# Patient Record
Sex: FEMALE | Race: BLACK OR AFRICAN AMERICAN
[De-identification: names, ages, dates, MRNs, and addresses within clinical notes are randomized per-mention and may not be internally consistent; named-entity substitution may affect disease eponyms.]

---

## 2019-11-01 ENCOUNTER — HOSPITAL ENCOUNTER (EMERGENCY)
Dept: HOSPITAL 62 - ER | Age: 69
LOS: 1 days | Discharge: HOME | End: 2019-11-02
Payer: MEDICARE

## 2019-11-01 DIAGNOSIS — R00.0: ICD-10-CM

## 2019-11-01 DIAGNOSIS — I10: ICD-10-CM

## 2019-11-01 DIAGNOSIS — K62.5: Primary | ICD-10-CM

## 2019-11-01 DIAGNOSIS — R19.4: ICD-10-CM

## 2019-11-01 DIAGNOSIS — R10.9: ICD-10-CM

## 2019-11-01 DIAGNOSIS — Z79.899: ICD-10-CM

## 2019-11-01 DIAGNOSIS — K57.30: ICD-10-CM

## 2019-11-01 DIAGNOSIS — R10.814: ICD-10-CM

## 2019-11-01 LAB
ADD MANUAL DIFF: NO
ALBUMIN SERPL-MCNC: 4.5 G/DL (ref 3.5–5)
ALP SERPL-CCNC: 94 U/L (ref 38–126)
ANION GAP SERPL CALC-SCNC: 9 MMOL/L (ref 5–19)
APPEARANCE UR: (no result)
APTT BLD: 30.2 SEC (ref 23.5–35.8)
APTT PPP: YELLOW S
AST SERPL-CCNC: 29 U/L (ref 14–36)
BASOPHILS # BLD AUTO: 0.1 10^3/UL (ref 0–0.2)
BASOPHILS NFR BLD AUTO: 0.7 % (ref 0–2)
BILIRUB DIRECT SERPL-MCNC: 0.2 MG/DL (ref 0–0.4)
BILIRUB SERPL-MCNC: 0.5 MG/DL (ref 0.2–1.3)
BILIRUB UR QL STRIP: NEGATIVE
BUN SERPL-MCNC: 16 MG/DL (ref 7–20)
CALCIUM: 9.5 MG/DL (ref 8.4–10.2)
CHLORIDE SERPL-SCNC: 103 MMOL/L (ref 98–107)
CO2 SERPL-SCNC: 28 MMOL/L (ref 22–30)
EOSINOPHIL # BLD AUTO: 0.2 10^3/UL (ref 0–0.6)
EOSINOPHIL NFR BLD AUTO: 3.2 % (ref 0–6)
ERYTHROCYTE [DISTWIDTH] IN BLOOD BY AUTOMATED COUNT: 14.6 % (ref 11.5–14)
GLUCOSE SERPL-MCNC: 156 MG/DL (ref 75–110)
GLUCOSE UR STRIP-MCNC: NEGATIVE MG/DL
HCT VFR BLD CALC: 40.9 % (ref 36–47)
HGB BLD-MCNC: 13.9 G/DL (ref 12–15.5)
INR PPP: 1.04
KETONES UR STRIP-MCNC: NEGATIVE MG/DL
LYMPHOCYTES # BLD AUTO: 2 10^3/UL (ref 0.5–4.7)
LYMPHOCYTES NFR BLD AUTO: 26.7 % (ref 13–45)
MCH RBC QN AUTO: 29.7 PG (ref 27–33.4)
MCHC RBC AUTO-ENTMCNC: 34.1 G/DL (ref 32–36)
MCV RBC AUTO: 87 FL (ref 80–97)
MONOCYTES # BLD AUTO: 0.5 10^3/UL (ref 0.1–1.4)
MONOCYTES NFR BLD AUTO: 6.6 % (ref 3–13)
NEUTROPHILS # BLD AUTO: 4.7 10^3/UL (ref 1.7–8.2)
NEUTS SEG NFR BLD AUTO: 62.8 % (ref 42–78)
PH UR STRIP: 5 [PH] (ref 5–9)
PLATELET # BLD: 244 10^3/UL (ref 150–450)
POTASSIUM SERPL-SCNC: 4 MMOL/L (ref 3.6–5)
PROT SERPL-MCNC: 7.7 G/DL (ref 6.3–8.2)
PROT UR STRIP-MCNC: NEGATIVE MG/DL
PROTHROMBIN TIME: 13.6 SEC (ref 11.4–15.4)
RBC # BLD AUTO: 4.7 10^6/UL (ref 3.72–5.28)
SP GR UR STRIP: 1.02
TOTAL CELLS COUNTED % (AUTO): 100 %
UROBILINOGEN UR-MCNC: NEGATIVE MG/DL (ref ?–2)
WBC # BLD AUTO: 7.5 10^3/UL (ref 4–10.5)

## 2019-11-01 PROCEDURE — 86850 RBC ANTIBODY SCREEN: CPT

## 2019-11-01 PROCEDURE — 36415 COLL VENOUS BLD VENIPUNCTURE: CPT

## 2019-11-01 PROCEDURE — 80053 COMPREHEN METABOLIC PANEL: CPT

## 2019-11-01 PROCEDURE — 86901 BLOOD TYPING SEROLOGIC RH(D): CPT

## 2019-11-01 PROCEDURE — 85730 THROMBOPLASTIN TIME PARTIAL: CPT

## 2019-11-01 PROCEDURE — 96361 HYDRATE IV INFUSION ADD-ON: CPT

## 2019-11-01 PROCEDURE — 74177 CT ABD & PELVIS W/CONTRAST: CPT

## 2019-11-01 PROCEDURE — 85610 PROTHROMBIN TIME: CPT

## 2019-11-01 PROCEDURE — 96360 HYDRATION IV INFUSION INIT: CPT

## 2019-11-01 PROCEDURE — 81001 URINALYSIS AUTO W/SCOPE: CPT

## 2019-11-01 PROCEDURE — 83690 ASSAY OF LIPASE: CPT

## 2019-11-01 PROCEDURE — 99284 EMERGENCY DEPT VISIT MOD MDM: CPT

## 2019-11-01 PROCEDURE — 85025 COMPLETE CBC W/AUTO DIFF WBC: CPT

## 2019-11-01 PROCEDURE — 86900 BLOOD TYPING SEROLOGIC ABO: CPT

## 2019-11-01 NOTE — RADIOLOGY REPORT (SQ)
CLINICAL HISTORY:  rectal bleeding, abdominal tenderness 



COMPARISON: None.



TECHNIQUE: CT ABDOMEN PELVIS WITH IV CONTRAST on 11/1/2019 10:37

PM CDT



This exam was performed according to our departmental

dose-optimization program, which includes automated exposure

control, adjustment of the mA and/or kV according to patient size

and/or use of iterative reconstruction technique.



FINDINGS: 



Lower lungs are clear.



Abdomen: There are several small cysts within the right lobe of

the liver. There is no biliary dilatation. Gallbladder is normal

in appearance. The pancreas and spleen are normal in appearance.

The adrenal glands and kidneys are unremarkable.



Abdominal aorta is normal in course and caliber without aneurysm.

There is no free air. There is no retroperitoneal adenopathy.



Pelvis: There is severe diverticulosis of the distal colon. There

is moderate amount stool throughout the colon. Urinary bladder is

unremarkable. There is no free fluid. Hysterectomy was performed.

Appendix is normal.



Skeleton: There are no acute osseous findings. No suspicious bony

lesions.



IMPRESSION: 



Extensive distal colonic diverticulosis without definite

diverticulitis.

## 2019-11-01 NOTE — ER DOCUMENT REPORT
ED Medical Screen (RME)





- General


Chief Complaint: Rectal Bleeding


Stated Complaint: RECTAL BLEEDING


Time Seen by Provider: 11/01/19 21:18


Mode of Arrival: Ambulatory


Information source: Patient


Notes: 





69-year-old female presents to ED for rectal bleeding.  She states this morning 

she had a stool that was dark with a lot of blood in the stool.  She states 

later in the day she had another stool that had lighter amount of blood in it 

and then tonight she had another dark stool with a lot of blood and that scared 

her so she came to the emergency room.  She states she had a colonoscopy she t

hinks it was last year and it was fine has never had blood in her stool before. 

He has a history of high blood pressure diabetes has had a hysterectomy and a 

surgery on her eyes as a child.  She denies any nausea vomiting or abdominal 

pain.











I have greeted and performed a rapid initial assessment of this patient.  A 

comprehensive ED assessment and evaluation of the patient, analysis of test res

ults and completion of medical decision making process will be conducted by an 

additional ED providers.





Physical Exam





- Vital signs


Vitals: 





                                        











Temp Pulse Resp BP Pulse Ox


 


 98.1 F   114 H  12   159/112 H  100 


 


 11/01/19 21:15  11/01/19 21:15  11/01/19 21:15  11/01/19 21:15  11/01/19 21:15














Course





- Vital Signs


Vital signs: 





                                        











Temp Pulse Resp BP Pulse Ox


 


 98.1 F   114 H  12   159/112 H  100 


 


 11/01/19 21:15  11/01/19 21:15  11/01/19 21:15  11/01/19 21:15  11/01/19 21:15

## 2019-11-01 NOTE — ER DOCUMENT REPORT
ED General





- General


Chief Complaint: Rectal Bleeding


Stated Complaint: RECTAL BLEEDING


Time Seen by Provider: 11/01/19 21:18


Mode of Arrival: Ambulatory





- HPI


Notes: 





Patient is a 69-year-old female who presents the emergency department for 

evaluation of rectal bleeding.  Patient states she woke this morning, thought 

she was going to have some diarrhea.  She had a large bowel movement that was 

dark in color, loose, with bright red blood.  She denied any associated pain 

with it.  She states she had a second bowel movement later in the afternoon with

less blood, and the stool was formed, not as dark.  She had a third bowel 

movement later in the evening but she states again had a large amount of dark 

red blood and loose stool.  She states she did have some minimal left-sided 

abdominal pain associated with this.  She really cannot describe it for me.  No 

gill fevers or chills.  No nausea or vomiting.  She had a colonoscopy 1 to 2 

years ago.  She states she was told there were no significant abnormalities.





- Related Data


Allergies/Adverse Reactions: 


                                        





Penicillins Adverse Reaction (Verified 11/01/19 21:24)


   








Home Medications: Losartan, unknown dose daily, aspirin 325 mg as needed





Past Medical History





- General


Information source: Patient





- Social History


Smoking Status: Never Smoker


Chew tobacco use (# tins/day): No


Frequency of alcohol use: None


Drug Abuse: None


Family History: Reviewed & Not Pertinent


Patient has suicidal ideation: No


Patient has homicidal ideation: No





- Past Medical History


Cardiac Medical History: Reports: Hx Hypertension





Review of Systems





- Review of Systems


Constitutional: No symptoms reported


EENT: No symptoms reported


Cardiovascular: No symptoms reported


Respiratory: No symptoms reported


Gastrointestinal: See HPI


Genitourinary: No symptoms reported


Musculoskeletal: No symptoms reported


Skin: No symptoms reported


Neurological/Psychological: No symptoms reported





Physical Exam





- Vital signs


Vitals: 


                                        











Temp Pulse Resp BP Pulse Ox


 


 98.1 F   114 H  12   159/112 H  100 


 


 11/01/19 21:15  11/01/19 21:15  11/01/19 21:15  11/01/19 21:15  11/01/19 21:15














- Notes


Notes: 





Vital signs reviewed, please refer to chart. Head is normocephalic, atraumatic. 

Pupils equal round, reactive to light.  Neck is supple without meningismus.  

Heart is regular rate and rhythm.  Lungs are clear to auscultation bilaterally. 

Abdomen is soft, moderate tenderness in the left mid to lower quadrants without 

rebound or guarding, normoactive bowel sounds throughout.  Extremities without 

cyanosis, clubbing. Posterior calves are nontender.  Peripheral pulses are 

equal.  Skin is warm and dry.  Patient is awake, alert, neurological exam is 

nonfocal.





Course





- Re-evaluation


Re-evalutation: 





11/01/19 22:43


Patient presents emergency department for evaluation of rectal bleeding.  She is

mildly tachycardic on arrival.  She had blood work ordered, including coags.  CT

scan of the abdomen and pelvis ordered secondary to tenderness and bleeding.  

She is given IV fluids.  We will continue to monitor.


11/02/19 02:43


Patient remained stable throughout the course of her stay.  Serial abdominal 

exams are benign.  She did provide a stool sample which was in fact found to be 

heme negative.  CT scan revealed only diverticulosis.  Her hemoglobin is stable.

 Patient had a colonoscopy 2 years ago.  At this point I feel comfortable with 

the patient being discharged to home.  She denies any melena, has had no further

hematochezia here.  Patient feels comfortable with the idea of discharge.


To the patient if she had any further bloody bowel movements she needs to 

return, and she voiced understanding.  She is to return to the ED with 

worsening.





- Vital Signs


Vital signs: 


                                        











Temp Pulse Resp BP Pulse Ox


 


 98.1 F   107 H  17   155/110 H  99 


 


 11/01/19 21:15  11/01/19 21:23  11/01/19 22:18  11/01/19 21:23  11/01/19 22:18














- Laboratory


Result Diagrams: 


                                 11/01/19 21:50





                                 11/01/19 21:50


Laboratory results interpreted by me: 


                                        











  11/01/19 11/01/19 11/01/19





  21:50 21:50 21:50


 


RDW  14.6 H  


 


Glucose   156 H 


 


Urine Blood    SMALL H














- Diagnostic Test


Radiology reviewed: Reports reviewed


Radiology results interpreted by me: 





11/02/19 02:44





                                        





                                 11/01/19 21:50 





                                 11/01/19 21:50 





                                        











MCV  87 fl (80-97)   11/01/19  21:50    


 


MCH  29.7 pg (27.0-33.4)   11/01/19  21:50    


 


MCHC  34.1 g/dL (32.0-36.0)   11/01/19  21:50    


 


RDW  14.6 % (11.5-14.0)  H  11/01/19  21:50    


 


Seg Neutrophils %  62.8 % (42-78)   11/01/19  21:50    


 


Chloride  103 mmol/L ()   11/01/19  21:50    


 


Carbon Dioxide  28 mmol/L (22-30)   11/01/19  21:50    


 


Anion Gap  9  (5-19)   11/01/19  21:50    


 


Est GFR ( Amer)  > 60  (>60)   11/01/19  21:50    


 


Glucose  156 mg/dL ()  H  11/01/19  21:50    


 


Calcium  9.5 mg/dL (8.4-10.2)   11/01/19  21:50    


 


Total Bilirubin  0.5 mg/dL (0.2-1.3)   11/01/19  21:50    


 


AST  29 U/L (14-36)   11/01/19  21:50    


 


Alkaline Phosphatase  94 U/L ()   11/01/19  21:50    


 


Total Protein  7.7 g/dL (6.3-8.2)   11/01/19  21:50    


 


Albumin  4.5 g/dL (3.5-5.0)   11/01/19  21:50    


 


Lipase  138.0 U/L ()   11/01/19  21:50    


 


Urine Color  YELLOW   11/01/19  21:50    


 


Urine Appearance  SLIGHTLY-CLOUDY   11/01/19  21:50    


 


Urine pH  5.0  (5.0-9.0)   11/01/19  21:50    


 


Ur Specific Gravity  1.023   11/01/19  21:50    


 


Urine Protein  NEGATIVE mg/dL (NEGATIVE)   11/01/19  21:50    


 


Urine Glucose (UA)  NEGATIVE mg/dL (NEGATIVE)   11/01/19  21:50    


 


Urine Ketones  NEGATIVE mg/dL (NEGATIVE)   11/01/19  21:50    


 


Urine Blood  SMALL  (NEGATIVE)  H  11/01/19  21:50    


 


Urine RBC (Auto)  0 /HPF  11/01/19  21:50    


 


Blood Type  A POSITIVE   11/01/19  21:50    


 


Antibody Screen  NEGATIVE   11/01/19  21:50    











                                        





Abdomen/Pelvis CT  11/01/19 22:37


IMPRESSION: 


 


Extensive distal colonic diverticulosis without definite


diverticulitis.


 














Discharge





- Discharge


Clinical Impression: 


 Rectal bleeding, Diverticulosis of colon





Condition: Stable


Disposition: HOME, SELF-CARE


Instructions:  Rectal Bleeding, Unclear Cause ([facility])


Additional Instructions: 


Follow-up with your primary care provider on Monday.  If you have another 

episode of bleeding, or you develop new or concerning symptoms of any sort, 

please return immediately to the emergency department for evaluation.

## 2019-11-02 VITALS — DIASTOLIC BLOOD PRESSURE: 105 MMHG | SYSTOLIC BLOOD PRESSURE: 144 MMHG

## 2020-02-28 ENCOUNTER — HOSPITAL ENCOUNTER (OUTPATIENT)
Dept: HOSPITAL 62 - WI | Age: 70
End: 2020-02-28
Attending: FAMILY MEDICINE
Payer: MEDICARE

## 2020-02-28 DIAGNOSIS — Z12.31: Primary | ICD-10-CM

## 2020-02-28 PROCEDURE — 77067 SCR MAMMO BI INCL CAD: CPT

## 2020-02-28 PROCEDURE — 77063 BREAST TOMOSYNTHESIS BI: CPT

## 2020-04-05 ENCOUNTER — HOSPITAL ENCOUNTER (INPATIENT)
Dept: HOSPITAL 62 - ER | Age: 70
LOS: 26 days | DRG: 870 | End: 2020-05-01
Attending: INTERNAL MEDICINE | Admitting: INTERNAL MEDICINE
Payer: MEDICARE

## 2020-04-05 DIAGNOSIS — J45.909: ICD-10-CM

## 2020-04-05 DIAGNOSIS — E78.5: ICD-10-CM

## 2020-04-05 DIAGNOSIS — E11.65: ICD-10-CM

## 2020-04-05 DIAGNOSIS — T79.7XXA: ICD-10-CM

## 2020-04-05 DIAGNOSIS — A41.89: Primary | ICD-10-CM

## 2020-04-05 DIAGNOSIS — J18.9: ICD-10-CM

## 2020-04-05 DIAGNOSIS — E66.01: ICD-10-CM

## 2020-04-05 DIAGNOSIS — U07.1: ICD-10-CM

## 2020-04-05 DIAGNOSIS — I46.9: ICD-10-CM

## 2020-04-05 DIAGNOSIS — I10: ICD-10-CM

## 2020-04-05 DIAGNOSIS — E87.0: ICD-10-CM

## 2020-04-05 DIAGNOSIS — E87.2: ICD-10-CM

## 2020-04-05 DIAGNOSIS — D64.9: ICD-10-CM

## 2020-04-05 DIAGNOSIS — R65.21: ICD-10-CM

## 2020-04-05 DIAGNOSIS — T82.594A: ICD-10-CM

## 2020-04-05 DIAGNOSIS — J96.01: ICD-10-CM

## 2020-04-05 LAB
A TYPE INFLUENZA AG: NEGATIVE
ADD MANUAL DIFF: NO
ALBUMIN SERPL-MCNC: 3.8 G/DL (ref 3.5–5)
ALP SERPL-CCNC: 78 U/L (ref 38–126)
ANION GAP SERPL CALC-SCNC: 8 MMOL/L (ref 5–19)
AST SERPL-CCNC: 81 U/L (ref 14–36)
B INFLUENZA AG: NEGATIVE
BASOPHILS # BLD AUTO: 0 10^3/UL (ref 0–0.2)
BASOPHILS NFR BLD AUTO: 0.1 % (ref 0–2)
BILIRUB DIRECT SERPL-MCNC: 0.1 MG/DL (ref 0–0.4)
BILIRUB SERPL-MCNC: 0.9 MG/DL (ref 0.2–1.3)
BUN SERPL-MCNC: 16 MG/DL (ref 7–20)
CALCIUM: 8.5 MG/DL (ref 8.4–10.2)
CHLORIDE SERPL-SCNC: 98 MMOL/L (ref 98–107)
CO2 SERPL-SCNC: 28 MMOL/L (ref 22–30)
EOSINOPHIL # BLD AUTO: 0 10^3/UL (ref 0–0.6)
EOSINOPHIL NFR BLD AUTO: 0.1 % (ref 0–6)
ERYTHROCYTE [DISTWIDTH] IN BLOOD BY AUTOMATED COUNT: 14.1 % (ref 11.5–14)
FERRITIN SERPL-MCNC: 641 NG/ML (ref 11.1–264)
GLUCOSE SERPL-MCNC: 196 MG/DL (ref 75–110)
HCT VFR BLD CALC: 41.1 % (ref 36–47)
HGB BLD-MCNC: 14.3 G/DL (ref 12–15.5)
LYMPHOCYTES # BLD AUTO: 0.7 10^3/UL (ref 0.5–4.7)
LYMPHOCYTES NFR BLD AUTO: 9.8 % (ref 13–45)
MCH RBC QN AUTO: 28.8 PG (ref 27–33.4)
MCHC RBC AUTO-ENTMCNC: 34.7 G/DL (ref 32–36)
MCV RBC AUTO: 83 FL (ref 80–97)
MONOCYTES # BLD AUTO: 0.4 10^3/UL (ref 0.1–1.4)
MONOCYTES NFR BLD AUTO: 6.6 % (ref 3–13)
NEUTROPHILS # BLD AUTO: 5.5 10^3/UL (ref 1.7–8.2)
NEUTS SEG NFR BLD AUTO: 83.4 % (ref 42–78)
PLATELET # BLD: 198 10^3/UL (ref 150–450)
POTASSIUM SERPL-SCNC: 4.2 MMOL/L (ref 3.6–5)
PROT SERPL-MCNC: 7.1 G/DL (ref 6.3–8.2)
RBC # BLD AUTO: 4.95 10^6/UL (ref 3.72–5.28)
TOTAL CELLS COUNTED % (AUTO): 100 %
WBC # BLD AUTO: 6.6 10^3/UL (ref 4–10.5)

## 2020-04-05 PROCEDURE — 87040 BLOOD CULTURE FOR BACTERIA: CPT

## 2020-04-05 PROCEDURE — 82550 ASSAY OF CK (CPK): CPT

## 2020-04-05 PROCEDURE — 85025 COMPLETE CBC W/AUTO DIFF WBC: CPT

## 2020-04-05 PROCEDURE — 87880 STREP A ASSAY W/OPTIC: CPT

## 2020-04-05 PROCEDURE — 96372 THER/PROPH/DIAG INJ SC/IM: CPT

## 2020-04-05 PROCEDURE — 99292 CRITICAL CARE ADDL 30 MIN: CPT

## 2020-04-05 PROCEDURE — 85730 THROMBOPLASTIN TIME PARTIAL: CPT

## 2020-04-05 PROCEDURE — 36620 INSERTION CATHETER ARTERY: CPT

## 2020-04-05 PROCEDURE — 86850 RBC ANTIBODY SCREEN: CPT

## 2020-04-05 PROCEDURE — 83605 ASSAY OF LACTIC ACID: CPT

## 2020-04-05 PROCEDURE — 83615 LACTATE (LD) (LDH) ENZYME: CPT

## 2020-04-05 PROCEDURE — 82150 ASSAY OF AMYLASE: CPT

## 2020-04-05 PROCEDURE — 99285 EMERGENCY DEPT VISIT HI MDM: CPT

## 2020-04-05 PROCEDURE — 86901 BLOOD TYPING SEROLOGIC RH(D): CPT

## 2020-04-05 PROCEDURE — 87581 M.PNEUMON DNA AMP PROBE: CPT

## 2020-04-05 PROCEDURE — 87804 INFLUENZA ASSAY W/OPTIC: CPT

## 2020-04-05 PROCEDURE — 82728 ASSAY OF FERRITIN: CPT

## 2020-04-05 PROCEDURE — 83735 ASSAY OF MAGNESIUM: CPT

## 2020-04-05 PROCEDURE — 81001 URINALYSIS AUTO W/SCOPE: CPT

## 2020-04-05 PROCEDURE — 36415 COLL VENOUS BLD VENIPUNCTURE: CPT

## 2020-04-05 PROCEDURE — 83880 ASSAY OF NATRIURETIC PEPTIDE: CPT

## 2020-04-05 PROCEDURE — 87633 RESP VIRUS 12-25 TARGETS: CPT

## 2020-04-05 PROCEDURE — 84484 ASSAY OF TROPONIN QUANT: CPT

## 2020-04-05 PROCEDURE — 93010 ELECTROCARDIOGRAM REPORT: CPT

## 2020-04-05 PROCEDURE — 86900 BLOOD TYPING SEROLOGIC ABO: CPT

## 2020-04-05 PROCEDURE — 87635 SARS-COV-2 COVID-19 AMP PRB: CPT

## 2020-04-05 PROCEDURE — S0119 ONDANSETRON 4 MG: HCPCS

## 2020-04-05 PROCEDURE — 82533 TOTAL CORTISOL: CPT

## 2020-04-05 PROCEDURE — 87150 DNA/RNA AMPLIFIED PROBE: CPT

## 2020-04-05 PROCEDURE — 80053 COMPREHEN METABOLIC PANEL: CPT

## 2020-04-05 PROCEDURE — 83690 ASSAY OF LIPASE: CPT

## 2020-04-05 PROCEDURE — 86920 COMPATIBILITY TEST SPIN: CPT

## 2020-04-05 PROCEDURE — 86140 C-REACTIVE PROTEIN: CPT

## 2020-04-05 PROCEDURE — 87449 NOS EACH ORGANISM AG IA: CPT

## 2020-04-05 PROCEDURE — 87070 CULTURE OTHR SPECIMN AEROBIC: CPT

## 2020-04-05 PROCEDURE — 31500 INSERT EMERGENCY AIRWAY: CPT

## 2020-04-05 PROCEDURE — 96360 HYDRATION IV INFUSION INIT: CPT

## 2020-04-05 PROCEDURE — 80048 BASIC METABOLIC PNL TOTAL CA: CPT

## 2020-04-05 PROCEDURE — 87324 CLOSTRIDIUM AG IA: CPT

## 2020-04-05 PROCEDURE — 94002 VENT MGMT INPAT INIT DAY: CPT

## 2020-04-05 PROCEDURE — 82553 CREATINE MB FRACTION: CPT

## 2020-04-05 PROCEDURE — 93005 ELECTROCARDIOGRAM TRACING: CPT

## 2020-04-05 PROCEDURE — 94003 VENT MGMT INPAT SUBQ DAY: CPT

## 2020-04-05 PROCEDURE — 87077 CULTURE AEROBIC IDENTIFY: CPT

## 2020-04-05 PROCEDURE — P9041 ALBUMIN (HUMAN),5%, 50ML: HCPCS

## 2020-04-05 PROCEDURE — 87186 SC STD MICRODIL/AGAR DIL: CPT

## 2020-04-05 PROCEDURE — 36430 TRANSFUSION BLD/BLD COMPNT: CPT

## 2020-04-05 PROCEDURE — 82803 BLOOD GASES ANY COMBINATION: CPT

## 2020-04-05 PROCEDURE — 84478 ASSAY OF TRIGLYCERIDES: CPT

## 2020-04-05 PROCEDURE — 85610 PROTHROMBIN TIME: CPT

## 2020-04-05 PROCEDURE — 85379 FIBRIN DEGRADATION QUANT: CPT

## 2020-04-05 PROCEDURE — 87205 SMEAR GRAM STAIN: CPT

## 2020-04-05 PROCEDURE — 99291 CRITICAL CARE FIRST HOUR: CPT

## 2020-04-05 PROCEDURE — 36600 WITHDRAWAL OF ARTERIAL BLOOD: CPT

## 2020-04-05 PROCEDURE — 83520 IMMUNOASSAY QUANT NOS NONAB: CPT

## 2020-04-05 PROCEDURE — 87486 CHLMYD PNEUM DNA AMP PROBE: CPT

## 2020-04-05 PROCEDURE — 84145 PROCALCITONIN (PCT): CPT

## 2020-04-05 PROCEDURE — 36556 INSERT NON-TUNNEL CV CATH: CPT

## 2020-04-05 PROCEDURE — 92950 HEART/LUNG RESUSCITATION CPR: CPT

## 2020-04-05 PROCEDURE — 80202 ASSAY OF VANCOMYCIN: CPT

## 2020-04-05 PROCEDURE — 94640 AIRWAY INHALATION TREATMENT: CPT

## 2020-04-05 PROCEDURE — 82962 GLUCOSE BLOOD TEST: CPT

## 2020-04-05 PROCEDURE — 84100 ASSAY OF PHOSPHORUS: CPT

## 2020-04-05 PROCEDURE — 71045 X-RAY EXAM CHEST 1 VIEW: CPT

## 2020-04-05 PROCEDURE — 87045 FECES CULTURE AEROBIC BACT: CPT

## 2020-04-05 PROCEDURE — 87798 DETECT AGENT NOS DNA AMP: CPT

## 2020-04-05 PROCEDURE — 71275 CT ANGIOGRAPHY CHEST: CPT

## 2020-04-05 PROCEDURE — P9016 RBC LEUKOCYTES REDUCED: HCPCS

## 2020-04-05 PROCEDURE — P9047 ALBUMIN (HUMAN), 25%, 50ML: HCPCS

## 2020-04-05 RX ADMIN — INSULIN LISPRO SCH UNIT: 100 INJECTION, SOLUTION INTRAVENOUS; SUBCUTANEOUS at 21:14

## 2020-04-05 RX ADMIN — OXYCODONE HYDROCHLORIDE AND ACETAMINOPHEN SCH MG: 500 TABLET ORAL at 18:57

## 2020-04-05 RX ADMIN — MELATONIN SCH MG: 3 TAB ORAL at 21:16

## 2020-04-05 NOTE — ER DOCUMENT REPORT
ED Dizziness/Weakness





- General


Chief Complaint: Weakness


Stated Complaint: SHORTNESS OF BREATH


Time Seen by Provider: 20 14:50


Primary Care Provider: 


DIANA RIVERA MD [Primary Care Provider] - Follow up as needed


Notes: 





Patient is a 70-year-old female who presents the emergency department with 

weakness and shortness of breath.  Patient has a history of asthma and 

hypertension.  She is currently on losartan and albuterol as needed.  She states

that she went to [place] for  and flew back 2 days ago.  This is during 

the COVID 19 pandemic.  Patient states that she has been using her albuterol inh

aler, but has had little relief.  Last time she used her albuterol inhaler was 

yesterday.  Patient reports feeling "rundown."


TRAVEL OUTSIDE OF THE [place] IN LAST 30 DAYS: No





- Related Data


Allergies/Adverse Reactions: 


                                        





Penicillins Adverse Reaction (Verified 19 21:24)


   








Home Medications: pt states that she is on metformin and "others for my 

cholesterol and high blood pressure but I dont remember the names".





Past Medical History





- General


Information source: Patient





- Social History


Smoking Status: Unknown if Ever Smoked


Family History: Reviewed & Not Pertinent


Patient has suicidal ideation: No


Patient has homicidal ideation: No





- Past Medical History


Cardiac Medical History: Reports: Hx Hypertension





Review of Systems





- Review of Systems


Notes: 





REVIEW OF SYSTEMS:





CONSTITUTIONAL :    Denies recent illness.  Denies recent unintentional weight 

loss.  Denies fever,  chills, or sweats. 


EENT: Denies eye, ear, throat, or mouth pain, discharge, or symptoms.  Denies 

nasal or sinus congestion.


CARDIOVASCULAR:  Denies chest pain.


RESPIRATORY: See HPI.


GASTROINTESTINAL: Denies nausea, vomiting, and diarrhea.  Denies abdominal pain.

 Denies constipation. 


GENITOURINARY:  Denies difficulty urinating, burning, blood in urine, urgency or

frequency.


MUSCULOSKELETAL:  Denies neck and back pain.  Denies joint pain or swelling.


SKIN:   Denies rash, itchiness, or lesions


HEMATOLOGIC :   Denies easy bruising or bleeding.


LYMPHATIC:  Denies swollen, painful, enlarged glands.


NEUROLOGICAL: See HPI.


PSYCHIATRIC:  Denies stress, anxiety, alteration in sleep patterns, or 

depression.





All other systems reviewed and negative.





Physical Exam





- Vital signs


Vitals: 


                                        











Resp Pulse Ox


 


 22 H  95 


 


 20 13:58  20 13:58














- Notes


Notes: 





PHYSICAL EXAMINATION:





GENERAL: Appears well, healthy, well-nourished, no acute distress. 





HEAD:  Normocephalic, atraumatic.





EYES:  PERRL, conjunctiva normal, all extraocular movements intact, sclera 

nonicteric





ENT:  Moist mucous membranes. 





NECK: Supple, no noticeable swelling, redness, rash.  Normal range of motion.





LUNGS: Diminished breath sounds in the bases.





CARDIOVASCULAR: S1-S2, regular rate, regular rhythm.  Radial pulses 2+, normal.





ABDOMEN: Normoactive bowel sounds.  Soft, nontender,  no guarding, no rebound 

tenderness, and no masses palpated.





EXTREMITIES: Normal strength and range of motion, no pitting or edema.  No 

cyanosis. 





NEUROLOGICAL: Moves all extremities upon command.  Strength 5/5 in all 

extremities. 





PSYCH: Normal mood, normal affect.





SKIN: Warm, dry.  No rash, lesions, ulcerations noted.  Normal skin turgor.





Course





- Re-evaluation


Re-evalutation: 


20 16:00


Patient's hematology does not show leukocytosis, but she does have a left shift 

with 83.4% neutrophils and only 9.8 lymphocytes.  Chemistries show a sodium of 

134.4.  Patient received IV fluids.  AST and ALT are elevated, most likely due 

to fatty liver disease.  Strep and influenza test are negative.  Chest x-ray 

shows possible pulmonary edema, infectious, or viral etiology.  BNP is normal, 

indicating this is most likely not congestive heart failure.  Patient will be 

sent for CTA of the chest to also rule out pulmonary emboli, as the patient is 

tachycardic.


20 17:31


I was called by Dr. Dillard, the radiologist.  She reports to me that the 

patient has groundglass opacities, consistent with most likely COVID 19.  Will 

call the hospitalist for admission, as the patient is still tachycardic.


20 17:38


I discussed this case with Dr. Lindo, the hospitalist, and the patient will be 

admitted to the medical COVID unit.








- Vital Signs


Vital signs: 


                                        











Temp Pulse Resp BP Pulse Ox


 


 98.9 F      23 H  128/80 H  100 


 


 20 16:31     20 16:01  20 16:01  20 16:01














- Laboratory


Result Diagrams: 


                                 20 14:00





                                 20 14:00


Laboratory results interpreted by me: 


                                        











  20





  14:00 14:00


 


RDW  14.1 H 


 


Lymph % (Auto)  9.8 L 


 


Seg Neutrophils %  83.4 H 


 


Sodium   134.4 L


 


Glucose   196 H


 


AST   81 H


 


ALT   44 H














- EKG Interpretation by Me


Additional EKG results interpreted by me: 





20 15:35


Sinus tachycardia.  Rate 101.  ; QRS 86; ; QTc 457.  No ST elev

ations or depressions noted.





Discharge





- Discharge


Clinical Impression: 


 Shortness of breath, Weakness





Condition: Stable


Disposition: ADMITTED AS INPATIENT


Admitting Provider: [place]


Unit Admitted: Medical Floor - COVID unit


Referrals: 


DIANA RIVERA MD [Primary Care Provider] - Follow up as needed

## 2020-04-05 NOTE — RADIOLOGY REPORT (SQ)
EXAM DESCRIPTION:  CHEST SINGLE VIEW



IMAGES COMPLETED DATE/TIME:  4/5/2020 2:39 pm



REASON FOR STUDY:  shortness of breath



COMPARISON:  None.



EXAM PARAMETERS:  NUMBER OF VIEWS: One view.

TECHNIQUE: Single frontal radiographic view of the chest acquired.

RADIATION DOSE: NA

LIMITATIONS: None.



FINDINGS:  LUNGS AND PLEURA: Patchy peripheral opacities in both lungs in the mid to lower region.  N
o pleural effusion or pneumothorax.

MEDIASTINUM AND HILAR STRUCTURES: No masses.  Contour normal.

HEART AND VASCULAR STRUCTURES: Heart has normal size.  There is indistinctness the pulmonary vasculat
ure with may represent mild pulmonary edema.

BONES: No acute findings.

HARDWARE: None in the chest.

OTHER: No other significant finding.



IMPRESSION:  Possible mild pulmonary edema.  Patchy peripheral opacities suspicious for infectious/in
flammatory process.



TECHNICAL DOCUMENTATION:  JOB ID:  [id number]

 2011 [employer]- All Rights Reserved



Reading location - IP/workstation name: [email or web address]

## 2020-04-05 NOTE — RADIOLOGY REPORT (SQ)
EXAM DESCRIPTION:  CTA CHEST



IMAGES COMPLETED DATE/TIME:  4/5/2020 3:56 pm



REASON FOR STUDY:  SOB; please also eval findings on x-ray.  Patchy peripheral opacities were seen on
 radiograph.



COMPARISON:  None.



TECHNIQUE:  CT scan of the chest performed using helical scanning technique with dynamic intravenous 
contrast injection.  Images reviewed with lung, soft tissue and bone windows.  Reconstructed coronal 
and sagittal MPR images reviewed.

Additional 3 dimensional post-processing performed to develop Maximal Intensity Projection images (MI
P).  All images stored on PACS.

All CT scanners at this facility use dose modulation, iterative reconstruction, and/or weight based d
osing when appropriate to reduce radiation dose to as low as reasonably achievable (ALARA).

[facility]: Dose Right  [facility]: CareDose    [facility]: Dose Right    [facility]: Teradose 4D    [facility]: Smart Technologies



CONTRAST TYPE AND DOSE:  contrast/concentration: Isovue 350.00 mg/ml; Total Contrast Delivered: 63.0 
ml; Total Saline Delivered: 80.0 ml

Contrast bolus optimized for the pulmonary arteries. Not diagnostic for the aorta.



RENAL FUNCTION:  GFR > 60.



RADIATION DOSE:  CT Rad equipment meets quality standard of care and radiation dose reduction techniq
ues were employed. CTDIvol: 14.3 - 19.8 mGy. DLP: 414 mGy-cm. .



LIMITATIONS:  None.



FINDINGS:  LUNGS AND PLEURA: There are patchy peripheral and perihilar ground-glass opacities in the 
upper and lower lobes bilaterally.  No pleural effusion.  No pneumothorax.

AORTA AND GREAT VESSELS: Aorta has normal caliber and appearance.  No aortic dissection.  Great vesse
ls are patent.

HEART: No pericardial effusion. No significant coronary artery calcifications.

PULMONARY ARTERIES: Limited evaluation of the pulmonary arteries due to contrast bolus timing.  No la
rge central pulmonary embolism.  Evaluation of the segmental and subsegmental pulmonary arteries is e
xtremely limited.

HILAR AND MEDIASTINAL STRUCTURES: No mediastinal or hilar adenopathy.  Perihilar opacities correspond
 to the findings on radiograph.

HARDWARE: None in the chest.

UPPER ABDOMEN: Hepatic cysts.

THYROID AND OTHER SOFT TISSUES: No masses.  No adenopathy.

BONES: No acute or significant finding.

3D MIPS: Confirm above findings.

OTHER: No other significant finding.



IMPRESSION:

1. Patchy peripheral and perihilar ground-glass opacities in both lungs.  These are commonly reported
 imaging features of COVID-19   pneumonia are present. Other processes such as influenza pneumonia an
d organizing pneumonia, as can be seen with drug toxicity and connective tissue disease, can cause a 
similar imaging pattern. PneTyp

2. Limited evaluation of the pulmonary arteries due to contrast bolus timing.  No large central pulmo
nary embolus.  Evaluation of the segmental and subsegmental pulmonary arteries is limited.



COMMENT:  Quality ID # 436: Final reports with documentation of one or more dose reduction techniques
 (e.g., Automated exposure control, adjustment of the mA and/or kV according to patient size, use of 
iterative reconstruction technique)



TECHNICAL DOCUMENTATION:  JOB ID:  [id number]

 2011 [employer]- All Rights Reserved



Reading location - IP/workstation name: [email or web address]

## 2020-04-05 NOTE — ADVANCED CARE
- Diagnosis


(1) Suspected COVID-19 virus infection


Diagnosis Current: Yes   





(2) Multifocal pneumonia


Diagnosis Current: Yes   





(3) Sepsis


Diagnosis Current: Yes   





(4) T2DM (type 2 diabetes mellitus)


Diagnosis Current: Yes   





(5) HLD (hyperlipidemia)


Diagnosis Current: Yes   





(6) HTN (hypertension)


Diagnosis Current: Yes   





(7) Fever


Diagnosis Current: Yes   





(8) Acute hypoxemic respiratory failure


Diagnosis Current: Yes   


Attendance: 





Patient


Resuscitation Status: Full Code


Discussion: 





All aspects of CODE STATUS including cardioversion, chest compressions, 

intubation were discussed and patient states she would like to be full code.  

She designates her daughter Mirian Hannon as her M POA


Time Spent: 17 minutes

## 2020-04-05 NOTE — PDOC H&P
History of Present Illness


Admission Date/PCP: 


  20 17:47





  DIANA RIVERA MD





History of Present Illness: 


SAROJ SORIANO is a 70 year old female with past medical history as noted who 

presented to ED after 4-day history of progressive fever/chills/shortness of 

breath/dry cough/fatigue/generalized weakness/loose stools which began while she

was in [place] visiting with family for her brother's , stating she flew

out on 3/16 and returned on , notably symptomatic prior to her departure.  

She denies any knowledge that her family members or other people around her have

also been ill.  She states the airport did not offer her any difficulties in 

accomplishing this travel despite restrictions nationwide.  In ED, patient 

notably has low-grade fevers, tachycardia, tachypnea, shortness of breath, 

lymphopenia with normal WBC, tolerating room air without supplemental oxygen.  

CTA of chest was done which was significant for multifocal pneumonia and 

radiologist specifically mentioned patient results highly consistent with covid-

19 pneumonia.  Patient admitted to coronavirus floor and I personally updated 

nursing staff but anticipates her arrival so that they may prepare therapy PPE 

appropriately.  I also notified the nursing supervisor and the local 

pediatrician that they should reevaluate any low risk patients on the 

coronavirus floor that could be moved elsewhere or discharged.








Past Medical History


Cardiac Medical History: Reports: Hypertension


Pulmonary Medical History: Reports: Asthma


Endocrine Medical History: Reports: Diabetes Mellitus Type 2





Past Surgical History


Past Surgical History: Reports: None





Social History


Information Source: Patient, Emergency Med Personnel


Lives with: Alone


Smoking Status: Never Smoker


Electronic Cigarette use?: No


Frequency of Alcohol Use: None


Hx Recreational Drug Use: No


Hx Prescription Drug Abuse: No





- Advance Directive


Resuscitation Status: Full Code


Surrogate healthcare decision maker:: 





daughter








Family History


Family History: Reviewed & Not Pertinent, CVA, Malignancy


Parental Family History Reviewed: Yes


Children Family History Reviewed: Yes


Sibling(s) Family History Reviewed.: Yes





Medication/Allergy


Home Medications: 








Aspirin [Aspirin 325 mg Tablet] 325 mg PO DAILY PRN 19 


Losartan Potassium 1 tab PO DAILY 19 








Allergies/Adverse Reactions: 


                                        





Penicillins Adverse Reaction (Verified 19 21:24)


   











Review of Systems


All systems: reviewed and no additional remarkable complaints except as stated -

See HPI for full review of systems, otherwise negative


Constitutional: PRESENT: as per HPI





Physical Exam


Vital Signs: 


                                        











Temp Pulse Resp BP Pulse Ox


 


 98.3 F      16   161/102 H  98 


 


 20 19:10     20 19:01  20 19:01  20 19:01








                                 Intake & Output











 20





 06:59 06:59 06:59


 


Intake Total   1000


 


Balance   1000


 


Weight   85.729 kg











General appearance: PRESENT: no acute distress, well-developed, well-nourished


Head exam: PRESENT: atraumatic, normocephalic


Eye exam: PRESENT: conjunctiva pink


Mouth exam: PRESENT: moist


Respiratory exam: PRESENT: crackles, rhonchi, tachypnea, unlabored.  ABSENT: 

accessory muscle use, wheezes


Cardiovascular exam: PRESENT: RRR.  ABSENT: diastolic murmur, rubs, systolic 

murmur


GI/Abdominal exam: PRESENT: normal bowel sounds, soft.  ABSENT: distended, 

guarding, mass, organolmegaly, rebound, tenderness


Rectal exam: PRESENT: deferred


Neurological exam: PRESENT: alert, awake, oriented to person, oriented to place,

oriented to time, oriented to situation


Psychiatric exam: PRESENT: appropriate affect, normal mood


Skin exam: PRESENT: dry, intact, warm





Results


Laboratory Results: 


                                        





                                 20 14:00 





                                 20 14:00 





                                        











  20





  14:00 14:00 17:50


 


WBC  6.6  


 


RBC  4.95  


 


Hgb  14.3  


 


Hct  41.1  


 


MCV  83  


 


MCH  28.8  


 


MCHC  34.7  


 


RDW  14.1 H  


 


Plt Count  198  


 


Seg Neutrophils %  83.4 H  


 


Sodium   134.4 L 


 


Potassium   4.2 


 


Chloride   98 


 


Carbon Dioxide   28 


 


Anion Gap   8 


 


BUN   16 


 


Creatinine   0.75 


 


Est GFR ( Amer)   > 60 


 


Glucose   196 H 


 


Calcium   8.5 


 


Ferritin    641.00 H


 


Total Bilirubin   0.9 


 


AST   81 H 


 


Alkaline Phosphatase   78 


 


Total Protein   7.1 


 


Albumin   3.8 








                                        











  20





  14:00


 


NT-Pro-B Natriuret Pep  32











Impressions: 


                                        





Chest X-Ray  20 15:03


IMPRESSION:  Possible mild pulmonary edema.  Patchy peripheral opacities 

suspicious for infectious/inflammatory process.


 








Chest/Abdomen CTA  20 16:08


IMPRESSION:


1. Patchy peripheral and perihilar ground-glass opacities in both lungs.  These 

are commonly reported imaging features of COVID-19   pneumonia are present. 

Other processes such as influenza pneumonia and organizing pneumonia, as can be 

seen with drug toxicity and connective tissue disease, can cause a similar 

imaging pattern. PneTyp


2. Limited evaluation of the pulmonary arteries due to contrast bolus timing.  

No large central pulmonary embolus.  Evaluation of the segmental and subse

gmental pulmonary arteries is limited.


 














Assessment and Plan





- Diagnosis


(1) Suspected COVID-19 virus infection


Is this a current diagnosis for this admission?: Yes   


Plan: 








High risk based on recent travel symptomatology and CT results


Coronavirus test sent and pending


Started on azithromycin and multiple supplements, consider adding Plaquenil at 

the first sign of worsening








(2) Multifocal pneumonia


Is this a current diagnosis for this admission?: Yes   


Plan: 





Ceftriaxone/azithromycin for antibiotic coverage; confirmed with patient that 

she is not in fact penicillin allergic


Bronchial hygiene


No nebulizer treatments or positive pressure should be used until coronavirus 

is ruled out


Respiratory viral panel pending


Influenza negative


Coronavirus test pending








(3) Sepsis


Is this a current diagnosis for this admission?: Yes   


Plan: 





Eating drinking normally and hemodynamically stable on admission


We will hold on IV fluids as this may significantly worsen ARDS if patient 

develops this later in the course of coronavirus assuming she has it


Does not require pressors


Antibiotics as above








(4) T2DM (type 2 diabetes mellitus)


Qualifiers: 


   Diabetes mellitus long term insulin use: without long term use   Diabetes 

mellitus complication status: without complication   Qualified Code(s): E11.9 - 

Type 2 diabetes mellitus without complications   


Is this a current diagnosis for this admission?: Yes   


Plan: 





Low-dose correctional insulin and Accu-Cheks


Hold orals








(5) HLD (hyperlipidemia)


Is this a current diagnosis for this admission?: Yes   


Plan: 





Not on statin per patient








(6) HTN (hypertension)


Is this a current diagnosis for this admission?: Yes   


Plan: 





Hold home meds, normal BP








(7) Fever


Is this a current diagnosis for this admission?: Yes   





(8) Acute hypoxemic respiratory failure


Is this a current diagnosis for this admission?: Yes   


Plan: 





Intermittently requiring supplemental oxygen


Oxygen goal is 92% or higher








- Time


Time Spent with patient: 35 or more minutes


Medications reviewed and adjusted accordingly: Yes





- Inpatient Certification


Based on my medical assessment, after consideration of the patient's 

comorbidities, presenting symptoms, or acuity I expect that the services needed 

warrant INPATIENT care.: Yes


I certify that my determination is in accordance with my understanding of 

Medicare's requirements for reasonable and necessary INPATIENT services [42 CFR 

412.3e].: Yes


Medical Necessity: Significant Comorbidiites Make Outpatient Treatment Too 

Risky, Need Close Monitoring Due to Risk of Patient Decompensation, Need for IV 

Antibiotics, Risk of Complication if Not Cared For in Hospital

## 2020-04-06 LAB
ADD MANUAL DIFF: NO
ALBUMIN SERPL-MCNC: 3.1 G/DL (ref 3.5–5)
ALP SERPL-CCNC: 64 U/L (ref 38–126)
ANION GAP SERPL CALC-SCNC: 5 MMOL/L (ref 5–19)
AST SERPL-CCNC: 66 U/L (ref 14–36)
BASOPHILS # BLD AUTO: 0 10^3/UL (ref 0–0.2)
BASOPHILS NFR BLD AUTO: 0.2 % (ref 0–2)
BILIRUB DIRECT SERPL-MCNC: 0.3 MG/DL (ref 0–0.4)
BILIRUB SERPL-MCNC: 0.6 MG/DL (ref 0.2–1.3)
BUN SERPL-MCNC: 10 MG/DL (ref 7–20)
CALCIUM: 8.1 MG/DL (ref 8.4–10.2)
CHLORIDE SERPL-SCNC: 101 MMOL/L (ref 98–107)
CO2 SERPL-SCNC: 27 MMOL/L (ref 22–30)
EOSINOPHIL # BLD AUTO: 0 10^3/UL (ref 0–0.6)
EOSINOPHIL NFR BLD AUTO: 0.1 % (ref 0–6)
ERYTHROCYTE [DISTWIDTH] IN BLOOD BY AUTOMATED COUNT: 14.2 % (ref 11.5–14)
GLUCOSE SERPL-MCNC: 143 MG/DL (ref 75–110)
HCT VFR BLD CALC: 35.6 % (ref 36–47)
HGB BLD-MCNC: 12.6 G/DL (ref 12–15.5)
LYMPHOCYTES # BLD AUTO: 0.5 10^3/UL (ref 0.5–4.7)
LYMPHOCYTES NFR BLD AUTO: 9.3 % (ref 13–45)
MCH RBC QN AUTO: 29.2 PG (ref 27–33.4)
MCHC RBC AUTO-ENTMCNC: 35.5 G/DL (ref 32–36)
MCV RBC AUTO: 82 FL (ref 80–97)
MONOCYTES # BLD AUTO: 0.3 10^3/UL (ref 0.1–1.4)
MONOCYTES NFR BLD AUTO: 5.6 % (ref 3–13)
NEUTROPHILS # BLD AUTO: 4.9 10^3/UL (ref 1.7–8.2)
NEUTS SEG NFR BLD AUTO: 84.8 % (ref 42–78)
PLATELET # BLD: 206 10^3/UL (ref 150–450)
POTASSIUM SERPL-SCNC: 3.7 MMOL/L (ref 3.6–5)
PROT SERPL-MCNC: 6.2 G/DL (ref 6.3–8.2)
RBC # BLD AUTO: 4.33 10^6/UL (ref 3.72–5.28)
TOTAL CELLS COUNTED % (AUTO): 100 %
WBC # BLD AUTO: 5.8 10^3/UL (ref 4–10.5)

## 2020-04-06 RX ADMIN — VANCOMYCIN HYDROCHLORIDE SCH MLS/HR: 1 INJECTION, POWDER, LYOPHILIZED, FOR SOLUTION INTRAVENOUS at 21:26

## 2020-04-06 RX ADMIN — INSULIN LISPRO SCH: 100 INJECTION, SOLUTION INTRAVENOUS; SUBCUTANEOUS at 11:20

## 2020-04-06 RX ADMIN — OXYCODONE HYDROCHLORIDE AND ACETAMINOPHEN SCH MG: 500 TABLET ORAL at 17:32

## 2020-04-06 RX ADMIN — OXYCODONE HYDROCHLORIDE AND ACETAMINOPHEN SCH MG: 500 TABLET ORAL at 09:10

## 2020-04-06 RX ADMIN — INSULIN LISPRO SCH: 100 INJECTION, SOLUTION INTRAVENOUS; SUBCUTANEOUS at 07:27

## 2020-04-06 RX ADMIN — ACETAMINOPHEN PRN MG: 325 TABLET ORAL at 15:59

## 2020-04-06 RX ADMIN — GUAIFENESIN PRN MG: 200 SOLUTION ORAL at 04:02

## 2020-04-06 RX ADMIN — AZITHROMYCIN MONOHYDRATE SCH MLS/HR: 500 INJECTION, POWDER, LYOPHILIZED, FOR SOLUTION INTRAVENOUS at 17:33

## 2020-04-06 RX ADMIN — INSULIN LISPRO SCH: 100 INJECTION, SOLUTION INTRAVENOUS; SUBCUTANEOUS at 16:08

## 2020-04-06 RX ADMIN — ENOXAPARIN SODIUM SCH MG: 40 INJECTION SUBCUTANEOUS at 09:10

## 2020-04-06 RX ADMIN — ACETAMINOPHEN PRN MG: 325 TABLET ORAL at 23:13

## 2020-04-06 RX ADMIN — ACETAMINOPHEN PRN MG: 325 TABLET ORAL at 11:18

## 2020-04-06 RX ADMIN — SODIUM CHLORIDE, SODIUM LACTATE, POTASSIUM CHLORIDE, AND CALCIUM CHLORIDE PRN MLS/HR: .6; .31; .03; .02 INJECTION, SOLUTION INTRAVENOUS at 15:00

## 2020-04-06 RX ADMIN — MELATONIN SCH MG: 3 TAB ORAL at 23:12

## 2020-04-06 RX ADMIN — ACETAMINOPHEN PRN MG: 325 TABLET ORAL at 03:41

## 2020-04-06 NOTE — EKG REPORT
SEVERITY:- ABNORMAL ECG -

SINUS TACHYCARDIA

PROBABLE LEFT ATRIAL ABNORMALITY

LEFT ANTERIOR FASCICULAR BLOCK

PROBABLE LEFT VENTRICULAR HYPERTROPHY

ABNORMAL T, CONSIDER ISCHEMIA, INFERIOR LEADS

:

Confirmed by: Raffaele Shea 06-Apr-2020 00:12:24

## 2020-04-06 NOTE — PDOC PROGRESS REPORT
Subjective


Progress Note for:: 04/06/20


Subjective:: 











Patient having higher fevers of 103 this morning, persistent myalgias and mild 

shortness of breath ongoing.  Started LR at 50 cc/h.  Blood pressure is lower 

limit normal.  Still has a dry cough.  Patient states she is spoken to her 

family and none of her family members are currently ill, I encouraged her to 

continue following up with them.  Coronavirus testing is still pending.  

Labs/symptoms/imaging are all highly suspicious for coronavirus.  Patient has no

new complaints today other than as mentioned above.


Reason For Visit: 


SHORTNESS OF BREATH,WEAKNESS








Physical Exam


Vital Signs: 


                                        











Temp Pulse Resp BP Pulse Ox


 


 100.1 F   102 H  24 H  107/81   94 


 


 04/06/20 12:06  04/06/20 11:30  04/06/20 11:30  04/06/20 11:30  04/06/20 11:30








                                 Intake & Output











 04/05/20 04/06/20 04/07/20





 06:59 06:59 06:59


 


Intake Total  1300 260


 


Balance  1300 260


 


Weight  86.1 kg 











Additional comments: 





Physical exam extremely limited by coronavirus contact/droplet/airborne 

precautions in place.  Patient was visually examined using video chat and audio.

 Head is normocephalic/atraumatic, mucous membranes moist.  No accessory muscle 

use or retractions with breathing.





Results


Laboratory Results: 


                                        





                                 04/06/20 05:31 





                                 04/06/20 05:31 





                                        











  04/05/20 04/06/20 04/06/20





  17:50 05:31 05:31


 


WBC   5.8 


 


RBC   4.33 


 


Hgb   12.6 


 


Hct   35.6 L 


 


MCV   82 


 


MCH   29.2 


 


MCHC   35.5 


 


RDW   14.2 H 


 


Plt Count   206 


 


Seg Neutrophils %   84.8 H 


 


Sodium    133.2 L


 


Potassium    3.7


 


Chloride    101


 


Carbon Dioxide    27


 


Anion Gap    5


 


BUN    10


 


Creatinine    0.63


 


Est GFR ( Amer)    > 60


 


Glucose    143 H


 


Calcium    8.1 L


 


Magnesium    2.3


 


Ferritin  641.00 H  


 


Total Bilirubin    0.6


 


AST    66 H


 


Alkaline Phosphatase    64


 


Total Protein    6.2 L


 


Albumin    3.1 L








                                        











  04/05/20





  14:00


 


NT-Pro-B Natriuret Pep  32











Impressions: 


                                        





Chest X-Ray  04/05/20 15:03


IMPRESSION:  Possible mild pulmonary edema.  Patchy peripheral opacities 

suspicious for infectious/inflammatory process.


 








Chest/Abdomen CTA  04/05/20 16:08


IMPRESSION:


1. Patchy peripheral and perihilar ground-glass opacities in both lungs.  These 

are commonly reported imaging features of COVID-19   pneumonia are present. 

Other processes such as influenza pneumonia and organizing pneumonia, as can be 

seen with drug toxicity and connective tissue disease, can cause a similar 

imaging pattern. PneTyp


2. Limited evaluation of the pulmonary arteries due to contrast bolus timing.  

No large central pulmonary embolus.  Evaluation of the segmental and 

subsegmental pulmonary arteries is limited.


 














Assessment and Plan





- Diagnosis


(1) Suspected COVID-19 virus infection


Is this a current diagnosis for this admission?: Yes   


Plan: 








High risk based on recent travel symptomatology, labs, and CT results


Ferritin notably elevated, LDH notably elevated


Coronavirus test sent and pending as of 4/5


Started on azithromycin and multiple supplements


Consider adding Plaquenil at the first sign of worsening; discussed with missael lomeli, low threshold for ICU transfer if breathing worsens








(2) Multifocal pneumonia


Is this a current diagnosis for this admission?: Yes   


Plan: 





Ceftriaxone/azithromycin for antibiotic coverage; confirmed with patient that 

she is not in fact penicillin allergic


Bronchial hygiene


No nebulizer treatments or positive pressure should be used until coronavirus 

is ruled out


Respiratory viral panel pending


Influenza negative


Coronavirus test pending








(3) Sepsis


Is this a current diagnosis for this admission?: Yes   


Plan: 





Eating drinking normally and hemodynamically stable on admission


Added gentle IV fluids for soft blood pressure and propensity for this to go 

lower as her infection progresses; watch for volume overload especially in the 

setting of ARDS which occurs somewhat commonly in coronavirus infections


Does not require pressors


Antibiotics as above








(4) T2DM (type 2 diabetes mellitus)


Qualifiers: 


   Diabetes mellitus long term insulin use: without long term use   Diabetes 

mellitus complication status: without complication   Qualified Code(s): E11.9 - 

Type 2 diabetes mellitus without complications   


Is this a current diagnosis for this admission?: Yes   


Plan: 





Low-dose correctional insulin and Accu-Cheks


Hold orals


Trend glucose, diabetic diet








(5) HLD (hyperlipidemia)


Is this a current diagnosis for this admission?: Yes   





(6) HTN (hypertension)


Is this a current diagnosis for this admission?: Yes   


Plan: 





Hold home meds, intermittently lower limit normal BP, asymptomatic








(7) Fever


Is this a current diagnosis for this admission?: Yes   





(8) Acute hypoxemic respiratory failure


Is this a current diagnosis for this admission?: Yes   


Plan: 





Intermittently requiring supplemental oxygen


Oxygen goal is 92% or higher


Unable to use nebulizer treatments are positive pressure due to risk of 

spreading coronavirus if this is positive








- Time


Time Spent with patient: 35 or more minutes


Medications reviewed and adjusted accordingly: Yes





- Inpatient Certification


Medical Necessity: Significant Comorbidiites Make Outpatient Treatment Too 

Risky, Need Close Monitoring Due to Risk of Patient Decompensation, Need for IV 

Antibiotics, Risk of Complication if Not Cared For in Hospital - Due to patient 

being COVID-19 Suspected/Positive, encounter was conducted using telephone 

and/or videochat and does not include a detailed in-person physical exam in 

order to preserve PPE and limit staff exposure to a dangerous pathogen.

## 2020-04-07 LAB
ADD MANUAL DIFF: NO
ANION GAP SERPL CALC-SCNC: 7 MMOL/L (ref 5–19)
BASOPHILS # BLD AUTO: 0 10^3/UL (ref 0–0.2)
BASOPHILS NFR BLD AUTO: 0.1 % (ref 0–2)
BUN SERPL-MCNC: 4 MG/DL (ref 7–20)
CALCIUM: 8 MG/DL (ref 8.4–10.2)
CHLORIDE SERPL-SCNC: 97 MMOL/L (ref 98–107)
CO2 SERPL-SCNC: 30 MMOL/L (ref 22–30)
EOSINOPHIL # BLD AUTO: 0 10^3/UL (ref 0–0.6)
EOSINOPHIL NFR BLD AUTO: 0 % (ref 0–6)
ERYTHROCYTE [DISTWIDTH] IN BLOOD BY AUTOMATED COUNT: 13.9 % (ref 11.5–14)
GLUCOSE SERPL-MCNC: 162 MG/DL (ref 75–110)
HCT VFR BLD CALC: 37.6 % (ref 36–47)
HGB BLD-MCNC: 13.1 G/DL (ref 12–15.5)
LYMPHOCYTES # BLD AUTO: 0.6 10^3/UL (ref 0.5–4.7)
LYMPHOCYTES NFR BLD AUTO: 9.5 % (ref 13–45)
MCH RBC QN AUTO: 29 PG (ref 27–33.4)
MCHC RBC AUTO-ENTMCNC: 34.9 G/DL (ref 32–36)
MCV RBC AUTO: 83 FL (ref 80–97)
MONOCYTES # BLD AUTO: 0.3 10^3/UL (ref 0.1–1.4)
MONOCYTES NFR BLD AUTO: 4.4 % (ref 3–13)
NEUTROPHILS # BLD AUTO: 5.7 10^3/UL (ref 1.7–8.2)
NEUTS SEG NFR BLD AUTO: 86 % (ref 42–78)
PLATELET # BLD: 266 10^3/UL (ref 150–450)
POTASSIUM SERPL-SCNC: 3.6 MMOL/L (ref 3.6–5)
RBC # BLD AUTO: 4.54 10^6/UL (ref 3.72–5.28)
TOTAL CELLS COUNTED % (AUTO): 100 %
WBC # BLD AUTO: 6.6 10^3/UL (ref 4–10.5)

## 2020-04-07 RX ADMIN — VANCOMYCIN HYDROCHLORIDE SCH MLS/HR: 1 INJECTION, POWDER, LYOPHILIZED, FOR SOLUTION INTRAVENOUS at 21:44

## 2020-04-07 RX ADMIN — INSULIN LISPRO SCH: 100 INJECTION, SOLUTION INTRAVENOUS; SUBCUTANEOUS at 08:50

## 2020-04-07 RX ADMIN — SODIUM CHLORIDE, SODIUM LACTATE, POTASSIUM CHLORIDE, AND CALCIUM CHLORIDE PRN MLS/HR: .6; .31; .03; .02 INJECTION, SOLUTION INTRAVENOUS at 11:44

## 2020-04-07 RX ADMIN — VANCOMYCIN HYDROCHLORIDE SCH MLS/HR: 1 INJECTION, POWDER, LYOPHILIZED, FOR SOLUTION INTRAVENOUS at 09:06

## 2020-04-07 RX ADMIN — ACETAMINOPHEN PRN MG: 325 TABLET ORAL at 11:31

## 2020-04-07 RX ADMIN — MELATONIN SCH MG: 3 TAB ORAL at 21:44

## 2020-04-07 RX ADMIN — INSULIN LISPRO SCH: 100 INJECTION, SOLUTION INTRAVENOUS; SUBCUTANEOUS at 00:24

## 2020-04-07 RX ADMIN — INSULIN LISPRO SCH: 100 INJECTION, SOLUTION INTRAVENOUS; SUBCUTANEOUS at 16:57

## 2020-04-07 RX ADMIN — AZITHROMYCIN MONOHYDRATE SCH MLS/HR: 500 INJECTION, POWDER, LYOPHILIZED, FOR SOLUTION INTRAVENOUS at 17:03

## 2020-04-07 RX ADMIN — ENOXAPARIN SODIUM SCH MG: 40 INJECTION SUBCUTANEOUS at 09:06

## 2020-04-07 RX ADMIN — INSULIN LISPRO SCH UNIT: 100 INJECTION, SOLUTION INTRAVENOUS; SUBCUTANEOUS at 11:31

## 2020-04-07 RX ADMIN — OXYCODONE HYDROCHLORIDE AND ACETAMINOPHEN SCH MG: 500 TABLET ORAL at 09:05

## 2020-04-07 RX ADMIN — INSULIN LISPRO SCH: 100 INJECTION, SOLUTION INTRAVENOUS; SUBCUTANEOUS at 21:44

## 2020-04-07 RX ADMIN — OXYCODONE HYDROCHLORIDE AND ACETAMINOPHEN SCH MG: 500 TABLET ORAL at 17:03

## 2020-04-07 NOTE — PDOC PROGRESS REPORT
Subjective


Progress Note for:: 04/07/20


Subjective:: 











Fever curve is downtrending with temperature averaging 100.2 today.  Cough is 

perhaps a bit less coarse but still present.  Patient states she has slightly 

more energy today but still rather fatigued.  No new complaints otherwise.  

Unfortunately, someone has spilled her coronavirus test in transit and this must

be repeated today.


Reason For Visit: 


SHORTNESS OF BREATH,WEAKNESS








Physical Exam


Vital Signs: 


                                        











Temp Pulse Resp BP Pulse Ox


 


 98.5 F   111 H  20   150/87 H  95 


 


 04/07/20 12:31  04/07/20 12:00  04/07/20 12:00  04/07/20 12:00  04/07/20 12:31








                                 Intake & Output











 04/06/20 04/07/20 04/08/20





 06:59 06:59 06:59


 


Intake Total 1300 1400 1510


 


Balance 1300 1400 1510


 


Weight 86.1 kg 86.1 kg 86.1 kg











General appearance: PRESENT: no acute distress, well-developed, well-nourished


Exam: 





Due to patient being COVID-19 Suspected/Positive, encounter was conducted using 

telephone and/or videochat and does not include a full detailed in-person 

physical exam in order to preserve PPE and limit staff exposure to a dangerous 

pathogen as is the current recommendation.


Head exam: PRESENT: atraumatic, normocephalic


Eye exam: PRESENT: conjunctiva pink





Results


Laboratory Results: 


                                        





                                 04/07/20 10:05 





                                 04/07/20 10:05 





                                        











  04/07/20 04/07/20





  10:05 10:05


 


WBC  6.6 


 


RBC  4.54 


 


Hgb  13.1 


 


Hct  37.6 


 


MCV  83 


 


MCH  29.0 


 


MCHC  34.9 


 


RDW  13.9 


 


Plt Count  266 


 


Seg Neutrophils %  86.0 H 


 


Sodium   133.5 L


 


Potassium   3.6


 


Chloride   97 L


 


Carbon Dioxide   30


 


Anion Gap   7


 


BUN   4 L


 


Creatinine   0.70


 


Est GFR (African Amer)   > 60


 


Glucose   162 H


 


Calcium   8.0 L








                                        





04/05/20 17:33   Blood   Blood Culture (PCR) - Final


                            Staphylococcus Species


04/05/20 14:00   Throat   Throat Culture - Final


                            NORMAL RICARDO





                                        











  04/05/20





  14:00


 


NT-Pro-B Natriuret Pep  32











Impressions: 


                                        





Chest X-Ray  04/05/20 15:03


IMPRESSION:  Possible mild pulmonary edema.  Patchy peripheral opacities 

suspicious for infectious/inflammatory process.


 








Chest/Abdomen CTA  04/05/20 16:08


IMPRESSION:


1. Patchy peripheral and perihilar ground-glass opacities in both lungs.  These 

are commonly reported imaging features of COVID-19   pneumonia are present. 

Other processes such as influenza pneumonia and organizing pneumonia, as can be 

seen with drug toxicity and connective tissue disease, can cause a similar 

imaging pattern. PneTyp


2. Limited evaluation of the pulmonary arteries due to contrast bolus timing.  

No large central pulmonary embolus.  Evaluation of the segmental and subse

gmental pulmonary arteries is limited.


 














Assessment and Plan





- Diagnosis


(1) Suspected COVID-19 virus infection


Is this a current diagnosis for this admission?: Yes   


Plan: 








High risk based on recent travel symptomatology, labs, and CT results; very 

high likelihood she has coronavirus


Ferritin notably elevated, LDH notably elevated


Coronavirus test sent and pending as of 4/5 unfortunately test was spilled and 

canceled; repeated 4/7 and is now pending again


Started on azithromycin and multiple supplements


Consider adding Plaquenil at the first sign of worsening; discussed with loli pérez, low threshold for ICU transfer if breathing worsens








(2) Multifocal pneumonia


Is this a current diagnosis for this admission?: Yes   


Plan: 





Ceftriaxone/azithromycin for antibiotic coverage; confirmed with patient that 

she is not in fact penicillin allergic


Bronchial hygiene


No nebulizer treatments or positive pressure should be used until coronavirus 

is ruled out


Respiratory viral panel pending


Influenza negative


Coronavirus test pending


Blood culture growing staph 1/2 bottles, follow-up final results


Added vancomycin and stopped ceftriaxone until MRSA ruled out








(3) Sepsis


Is this a current diagnosis for this admission?: Yes   





(4) T2DM (type 2 diabetes mellitus)


Qualifiers: 


   Diabetes mellitus long term insulin use: without long term use   Diabetes 

mellitus complication status: without complication   Qualified Code(s): E11.9 - 

Type 2 diabetes mellitus without complications   


Is this a current diagnosis for this admission?: Yes   





(5) HLD (hyperlipidemia)


Is this a current diagnosis for this admission?: Yes   





(6) HTN (hypertension)


Is this a current diagnosis for this admission?: Yes   





(7) Fever


Is this a current diagnosis for this admission?: Yes   





(8) Acute hypoxemic respiratory failure


Is this a current diagnosis for this admission?: Yes   





- Time


Time Spent with patient: 35 or more minutes


Medications reviewed and adjusted accordingly: Yes





- Inpatient Certification


Medical Necessity: Significant Comorbidiites Make Outpatient Treatment Too 

Risky, Need Close Monitoring Due to Risk of Patient Decompensation, Need for IV 

Antibiotics, Risk of Diagnosis Which Will Require Inpatient Eval/Care/Monitoring

## 2020-04-08 LAB — VANCOMYCIN,TROUGH: 10.4 UG/ML (ref 5–20)

## 2020-04-08 RX ADMIN — VANCOMYCIN HYDROCHLORIDE SCH MLS/HR: 1 INJECTION, POWDER, LYOPHILIZED, FOR SOLUTION INTRAVENOUS at 11:09

## 2020-04-08 RX ADMIN — INSULIN LISPRO SCH: 100 INJECTION, SOLUTION INTRAVENOUS; SUBCUTANEOUS at 09:32

## 2020-04-08 RX ADMIN — HYDROXYCHLOROQUINE SULFATE SCH MG: 200 TABLET, FILM COATED ORAL at 16:44

## 2020-04-08 RX ADMIN — GUAIFENESIN PRN MG: 200 SOLUTION ORAL at 16:44

## 2020-04-08 RX ADMIN — ACETAMINOPHEN PRN MG: 325 TABLET ORAL at 17:16

## 2020-04-08 RX ADMIN — OXYCODONE HYDROCHLORIDE AND ACETAMINOPHEN SCH MG: 500 TABLET ORAL at 17:17

## 2020-04-08 RX ADMIN — SODIUM CHLORIDE, SODIUM LACTATE, POTASSIUM CHLORIDE, AND CALCIUM CHLORIDE PRN MLS/HR: .6; .31; .03; .02 INJECTION, SOLUTION INTRAVENOUS at 18:07

## 2020-04-08 RX ADMIN — INSULIN LISPRO SCH UNIT: 100 INJECTION, SOLUTION INTRAVENOUS; SUBCUTANEOUS at 11:53

## 2020-04-08 RX ADMIN — AZITHROMYCIN MONOHYDRATE SCH MLS/HR: 500 INJECTION, POWDER, LYOPHILIZED, FOR SOLUTION INTRAVENOUS at 18:06

## 2020-04-08 RX ADMIN — Medication SCH MG: at 16:44

## 2020-04-08 RX ADMIN — HYDROXYCHLOROQUINE SULFATE SCH MG: 200 TABLET, FILM COATED ORAL at 22:57

## 2020-04-08 RX ADMIN — INSULIN LISPRO SCH UNIT: 100 INJECTION, SOLUTION INTRAVENOUS; SUBCUTANEOUS at 17:14

## 2020-04-08 RX ADMIN — GUAIFENESIN PRN MG: 200 SOLUTION ORAL at 12:17

## 2020-04-08 RX ADMIN — INSULIN LISPRO SCH: 100 INJECTION, SOLUTION INTRAVENOUS; SUBCUTANEOUS at 21:27

## 2020-04-08 RX ADMIN — OXYCODONE HYDROCHLORIDE AND ACETAMINOPHEN SCH MG: 500 TABLET ORAL at 11:10

## 2020-04-08 RX ADMIN — VITAMIN D, TAB 1000IU (100/BT) SCH UNIT: 25 TAB at 16:44

## 2020-04-08 RX ADMIN — ENOXAPARIN SODIUM SCH MG: 40 INJECTION SUBCUTANEOUS at 11:10

## 2020-04-08 RX ADMIN — ACETAMINOPHEN PRN MG: 325 TABLET ORAL at 03:36

## 2020-04-08 RX ADMIN — MELATONIN SCH MG: 3 TAB ORAL at 21:02

## 2020-04-08 NOTE — PDOC PROGRESS REPORT
Subjective


Progress Note for:: 04/08/20


Subjective:: 











Repeat coronavirus testing came back today and was noted to be positive.  I 

informed the patient of this and spoke in great detail with her family about 

what this means for them.  Patient [place] 

[place] and I recommended that he contact his CO and have his family tested for 

coronavirus at the local hospital.  I also recommended that they quarantine 

themselves for 14 days and wear a mask anytime to have contact or could have c

ontact with other people.  Plaquenil started today and supplements with 

azithromycin are continued.  Blood culture turned out to be a contaminant with 

staph hominis 1/2 bottles and vancomycin was stopped.  Patient states she does 

not have any fevers/chills today, still has a mildly congested cough but is 

overall feeling somewhat better.  No new symptoms otherwise


Reason For Visit: 


SHORTNESS OF BREATH,WEAKNESS








Physical Exam


Vital Signs: 


                                        











Temp Pulse Resp BP Pulse Ox


 


 98.7 F   99   20   136/72 H  99 


 


 04/08/20 12:00  04/08/20 12:00  04/08/20 12:00  04/08/20 12:00  04/08/20 12:00








                                 Intake & Output











 04/07/20 04/08/20 04/09/20





 06:59 06:59 06:59


 


Intake Total 1400 1760 1000


 


Balance 1400 1760 1000


 


Weight 86.1 kg 89.4 kg 











General appearance: PRESENT: no acute distress, well-developed, well-nourished


Head exam: PRESENT: atraumatic, normocephalic


Respiratory exam: PRESENT: unlabored.  ABSENT: accessory muscle use


Neurological exam: PRESENT: alert, awake, oriented to person, oriented to place,

oriented to time, oriented to situation


Additional comments: 





Due to patient being COVID-19 Suspected/Positive, encounter was conducted using 

telephone and/or videochat and does not include a detailed in-person physical 

exam in order to preserve PPE and limit staff exposure to a dangerous pathogen.





Results


Laboratory Results: 


                                        





                                 04/07/20 10:05 04/07/20 10:05 04/05/20 17:33   Blood   Blood Culture (PCR) - Final


                            Staphylococcus Species


04/05/20 17:33   Blood   Blood Culture - Final


                            Staphylococcus Hominis





                                        











  04/05/20





  14:00


 


NT-Pro-B Natriuret Pep  32











Impressions: 


                                        





Chest X-Ray  04/05/20 15:03


IMPRESSION:  Possible mild pulmonary edema.  Patchy peripheral opacities 

suspicious for infectious/inflammatory process.


 








Chest/Abdomen CTA  04/05/20 16:08


IMPRESSION:


1. Patchy peripheral and perihilar ground-glass opacities in both lungs.  These 

are commonly reported imaging features of COVID-19   pneumonia are present. 

Other processes such as influenza pneumonia and organizing pneumonia, as can be 

seen with drug toxicity and connective tissue disease, can cause a similar 

imaging pattern. PneTyp


2. Limited evaluation of the pulmonary arteries due to contrast bolus timing.  

No large central pulmonary embolus.  Evaluation of the segmental and 

subsegmental pulmonary arteries is limited.


 














Assessment and Plan





- Diagnosis


(1) COVID-19 virus infection


Is this a current diagnosis for this admission?: Yes   


Plan: 





Coronavirus testing resulted as + on 4/8, discussed with patient and also 

discussed with her family at her request


High risk based on recent travel symptomatology, labs, and CT results; on 

admission there was already very high likelihood she has coronavirus


Ferritin notably elevated, LDH notably elevated


Coronavirus test sent and pending as of 4/5 unfortunately test was spilled and 

canceled; repeated 4/7 and was positive


Started on azithromycin and multiple evidence-based supplements


Started Plaquenil 4/8, 400 mg twice daily for 1 day then 200 mg twice daily for

4 days








(2) Acute hypoxemic respiratory failure


Is this a current diagnosis for this admission?: Yes   


Plan: 





Intermittently requiring supplemental oxygen, up to 2 L nasal cannula at most


Oxygen goal is 92% or higher


Unable to use nebulizer treatments are positive pressure due to risk of 

spreading coronavirus if this is positive








(3) Multifocal pneumonia


Is this a current diagnosis for this admission?: Yes   


Plan: 








Viral pneumonia from coronavirus; watch for secondary bacterial infection which

is commonly seen in the setting


Ceftriaxone/azithromycin for antibiotic coverage; confirmed with patient that 

she is not in fact penicillin allergic; ceftriaxone changed to vancomycin to 

rule out MRSA possibly growing in blood culture, this later resulted as staph 

hominis contaminant 1/2 bottles and vancomycin was stopped; azithromycin 

continued with Plaquenil added


Bronchial hygiene


No nebulizer treatments or positive pressure should be used until coronavirus 

is ruled out


Respiratory viral panel ordered


Influenza negative


Coronavirus test positive


Blood culture growing staph hominis 1/2 bottles, contaminant











(4) Sepsis


Is this a current diagnosis for this admission?: Yes   


Plan: 





Resolved/resolving


Eating drinking normally and hemodynamically stable on admission


Added gentle IV fluids for soft blood pressure and propensity for this to go 

lower as her infection progresses; watch for volume overload especially in the 

setting of ARDS which occurs somewhat commonly in coronavirus infections


Does not require pressors


Antibiotics as above








(5) T2DM (type 2 diabetes mellitus)


Qualifiers: 


   Diabetes mellitus long term insulin use: without long term use   Diabetes 

mellitus complication status: without complication   Qualified Code(s): E11.9 - 

Type 2 diabetes mellitus without complications   


Is this a current diagnosis for this admission?: Yes   





(6) HLD (hyperlipidemia)


Is this a current diagnosis for this admission?: Yes   





(7) HTN (hypertension)


Is this a current diagnosis for this admission?: Yes   





(8) Fever


Is this a current diagnosis for this admission?: Yes   





- Time


Time Spent with patient: 35 or more minutes


Medications reviewed and adjusted accordingly: Yes





- Inpatient Certification


Medical Necessity: Failure to Improve With Outpatient Therapy, Significant 

Comorbidiites Make Outpatient Treatment Too Risky, Need For IV Fluids, Need for 

IV Antibiotics, Risk of Complication if Not Cared For in Hospital, Risk of 

Diagnosis Which Will Require Inpatient Eval/Care/Monitoring

## 2020-04-09 LAB
ADD MANUAL DIFF: NO
ANION GAP SERPL CALC-SCNC: 6 MMOL/L (ref 5–19)
BASOPHILS # BLD AUTO: 0 10^3/UL (ref 0–0.2)
BASOPHILS NFR BLD AUTO: 0.4 % (ref 0–2)
BUN SERPL-MCNC: 6 MG/DL (ref 7–20)
CALCIUM: 8.1 MG/DL (ref 8.4–10.2)
CHLORIDE SERPL-SCNC: 101 MMOL/L (ref 98–107)
CO2 SERPL-SCNC: 30 MMOL/L (ref 22–30)
EOSINOPHIL # BLD AUTO: 0 10^3/UL (ref 0–0.6)
EOSINOPHIL NFR BLD AUTO: 0.5 % (ref 0–6)
ERYTHROCYTE [DISTWIDTH] IN BLOOD BY AUTOMATED COUNT: 14.1 % (ref 11.5–14)
GLUCOSE SERPL-MCNC: 126 MG/DL (ref 75–110)
HCT VFR BLD CALC: 35.5 % (ref 36–47)
HGB BLD-MCNC: 12.4 G/DL (ref 12–15.5)
LYMPHOCYTES # BLD AUTO: 0.7 10^3/UL (ref 0.5–4.7)
LYMPHOCYTES NFR BLD AUTO: 11.1 % (ref 13–45)
MCH RBC QN AUTO: 28.9 PG (ref 27–33.4)
MCHC RBC AUTO-ENTMCNC: 34.8 G/DL (ref 32–36)
MCV RBC AUTO: 83 FL (ref 80–97)
MONOCYTES # BLD AUTO: 0.4 10^3/UL (ref 0.1–1.4)
MONOCYTES NFR BLD AUTO: 5.9 % (ref 3–13)
NEUTROPHILS # BLD AUTO: 5 10^3/UL (ref 1.7–8.2)
NEUTS SEG NFR BLD AUTO: 82.1 % (ref 42–78)
PLATELET # BLD: 347 10^3/UL (ref 150–450)
POTASSIUM SERPL-SCNC: 4.6 MMOL/L (ref 3.6–5)
RBC # BLD AUTO: 4.28 10^6/UL (ref 3.72–5.28)
TOTAL CELLS COUNTED % (AUTO): 100 %
WBC # BLD AUTO: 6.1 10^3/UL (ref 4–10.5)

## 2020-04-09 RX ADMIN — GUAIFENESIN PRN MG: 200 SOLUTION ORAL at 02:54

## 2020-04-09 RX ADMIN — VITAMIN D, TAB 1000IU (100/BT) SCH UNIT: 25 TAB at 10:37

## 2020-04-09 RX ADMIN — AZITHROMYCIN MONOHYDRATE SCH MLS/HR: 500 INJECTION, POWDER, LYOPHILIZED, FOR SOLUTION INTRAVENOUS at 17:46

## 2020-04-09 RX ADMIN — ACETAMINOPHEN PRN MG: 325 TABLET ORAL at 02:53

## 2020-04-09 RX ADMIN — INSULIN LISPRO SCH: 100 INJECTION, SOLUTION INTRAVENOUS; SUBCUTANEOUS at 11:16

## 2020-04-09 RX ADMIN — SODIUM CHLORIDE, SODIUM LACTATE, POTASSIUM CHLORIDE, AND CALCIUM CHLORIDE PRN MLS/HR: .6; .31; .03; .02 INJECTION, SOLUTION INTRAVENOUS at 12:26

## 2020-04-09 RX ADMIN — HYDROXYCHLOROQUINE SULFATE SCH MG: 200 TABLET, FILM COATED ORAL at 10:37

## 2020-04-09 RX ADMIN — OXYCODONE HYDROCHLORIDE AND ACETAMINOPHEN SCH MG: 500 TABLET ORAL at 17:45

## 2020-04-09 RX ADMIN — INSULIN LISPRO SCH: 100 INJECTION, SOLUTION INTRAVENOUS; SUBCUTANEOUS at 07:44

## 2020-04-09 RX ADMIN — GUAIFENESIN PRN MG: 200 SOLUTION ORAL at 23:53

## 2020-04-09 RX ADMIN — Medication SCH MG: at 10:37

## 2020-04-09 RX ADMIN — MELATONIN SCH MG: 3 TAB ORAL at 21:46

## 2020-04-09 RX ADMIN — ACETAMINOPHEN PRN MG: 325 TABLET ORAL at 16:21

## 2020-04-09 RX ADMIN — OXYCODONE HYDROCHLORIDE AND ACETAMINOPHEN SCH MG: 500 TABLET ORAL at 10:37

## 2020-04-09 RX ADMIN — INSULIN LISPRO SCH: 100 INJECTION, SOLUTION INTRAVENOUS; SUBCUTANEOUS at 16:25

## 2020-04-09 RX ADMIN — HYDROXYCHLOROQUINE SULFATE SCH MG: 200 TABLET, FILM COATED ORAL at 17:45

## 2020-04-09 RX ADMIN — ENOXAPARIN SODIUM SCH MG: 40 INJECTION SUBCUTANEOUS at 10:36

## 2020-04-09 RX ADMIN — INSULIN LISPRO SCH UNIT: 100 INJECTION, SOLUTION INTRAVENOUS; SUBCUTANEOUS at 21:46

## 2020-04-09 NOTE — EKG REPORT
SEVERITY:- ABNORMAL ECG -

SINUS RHYTHM

LEFT ANTERIOR FASCICULAR BLOCK

BORDERLINE T ABNORMALITIES, DIFFUSE LEADS

:

Confirmed by: Catina Colon MD 09-Apr-2020 21:21:26

## 2020-04-09 NOTE — PDOC PROGRESS REPORT
Subjective


Progress Note for:: 04/09/20


Subjective:: 











I asked the patient if any of her family in [place] is sick and she states they

are all well.  It appears she is not told them that they should be quarantined 

and that they should be wearing masks.  She does not appear to be particularly 

concerned about this information.  She states she is still having some cough but

it is not really productive.  She had any other fever over 102 today and she 

states she could feel this.  She is requiring supplemental oxygen at 2 L and I 

discussed with the nurse to start titrating this off as able.  O2 saturation 

goal would be greater than 92 to 94%.  If she continues to improve and her fever

curve goes down, no longer needs supplemental oxygen.  We could discharge her 

home for the remainder of her quarantine.  Otherwise, patient has no new 

complaints.


Reason For Visit: 


SHORTNESS OF BREATH,WEAKNESS








Physical Exam


Vital Signs: 


                                        











Temp Pulse Resp BP Pulse Ox


 


 99.4 F   96   24 H  145/82 H  92 


 


 04/09/20 10:54  04/09/20 10:54  04/09/20 10:54  04/09/20 10:54  04/09/20 10:54








                                 Intake & Output











 04/08/20 04/09/20 04/10/20





 06:59 06:59 06:59


 


Intake Total 1760 3360 1016


 


Output Total   450


 


Balance 1760 3360 566


 


Weight 89.4 kg 88.3 kg 











General appearance: PRESENT: no acute distress, well-developed, well-nourished


Head exam: PRESENT: atraumatic, normocephalic


Neurological exam: PRESENT: alert, awake


Psychiatric exam: PRESENT: appropriate affect, normal mood


Additional comments: 





Due to patient being COVID-19 Suspected/Positive, encounter was conducted using 

telephone and/or videochat and does not include a detailed in-person physical 

exam in order to preserve PPE and limit staff exposure to a dangerous pathogen.





Results


Laboratory Results: 


                                        





                                 04/09/20 12:15 





                                 04/09/20 11:44 





                                        











  04/09/20 04/09/20





  11:44 12:15


 


WBC   6.1


 


RBC   4.28


 


Hgb   12.4


 


Hct   35.5 L


 


MCV   83


 


MCH   28.9


 


MCHC   34.8


 


RDW   14.1 H


 


Plt Count   347


 


Seg Neutrophils %   82.1 H


 


Sodium  136.7 L 


 


Potassium  4.6 


 


Chloride  101 


 


Carbon Dioxide  30 


 


Anion Gap  6 


 


BUN  6 L 


 


Creatinine  0.65 


 


Est GFR (African Amer)  > 60 


 


Glucose  126 H 


 


Calcium  8.1 L 








                                        











  04/05/20





  14:00


 


NT-Pro-B Natriuret Pep  32











Impressions: 


                                        





Chest X-Ray  04/05/20 15:03


IMPRESSION:  Possible mild pulmonary edema.  Patchy peripheral opacities 

suspicious for infectious/inflammatory process.


 








Chest/Abdomen CTA  04/05/20 16:08


IMPRESSION:


1. Patchy peripheral and perihilar ground-glass opacities in both lungs.  These 

are commonly reported imaging features of COVID-19   pneumonia are present. 

Other processes such as influenza pneumonia and organizing pneumonia, as can be 

seen with drug toxicity and connective tissue disease, can cause a similar 

imaging pattern. PneTyp


2. Limited evaluation of the pulmonary arteries due to contrast bolus timing.  

No large central pulmonary embolus.  Evaluation of the segmental and 

subsegmental pulmonary arteries is limited.


 














Assessment and Plan





- Diagnosis


(1) COVID-19 virus infection


Is this a current diagnosis for this admission?: Yes   


Plan: 





Coronavirus testing resulted as + on 4/8, discussed with patient and also disc

ussed with her family at her request


High risk based on recent travel symptomatology, labs, and CT results; on 

admission there was already very high likelihood she has coronavirus


Ferritin notably elevated, LDH notably elevated


Coronavirus test sent and pending as of 4/5 unfortunately test was spilled and 

canceled; repeated 4/7 and was positive


Started on azithromycin and multiple evidence-based supplements


Started Plaquenil 4/8, 400 mg twice daily for 1 day then 200 mg twice daily for

4 days


Fever curve trending down








(2) Acute hypoxemic respiratory failure


Is this a current diagnosis for this admission?: Yes   


Plan: 





Intermittently requiring supplemental oxygen, up to 2 L nasal cannula at most


Oxygen goal is 92% or higher, discussed with nursing to titrate supplemental 

oxygen off as able


Unable to use nebulizer treatments are positive pressure due to risk of 

spreading coronavirus if this is positive








(3) Multifocal pneumonia


Is this a current diagnosis for this admission?: Yes   





(4) Sepsis


Is this a current diagnosis for this admission?: Yes   





(5) T2DM (type 2 diabetes mellitus)


Qualifiers: 


   Diabetes mellitus long term insulin use: without long term use   Diabetes 

mellitus complication status: without complication   Qualified Code(s): E11.9 - 

Type 2 diabetes mellitus without complications   


Is this a current diagnosis for this admission?: Yes   





(6) HLD (hyperlipidemia)


Is this a current diagnosis for this admission?: Yes   





(7) HTN (hypertension)


Is this a current diagnosis for this admission?: Yes   





(8) Fever


Is this a current diagnosis for this admission?: Yes   





- Time


Time Spent with patient: 25-34 minutes


Medications reviewed and adjusted accordingly: Yes


Anticipated discharge: Home


Within: within 72 hours





- Inpatient Certification


Medical Necessity: Significant Comorbidiites Make Outpatient Treatment Too 

Risky, Need Close Monitoring Due to Risk of Patient Decompensation, Need for IV 

Antibiotics, Risk of Complication if Not Cared For in Hospital

## 2020-04-10 LAB
ADD MANUAL DIFF: NO
ALBUMIN SERPL-MCNC: 3.7 G/DL (ref 3.5–5)
ALP SERPL-CCNC: 108 U/L (ref 38–126)
AMYLASE SERPL-CCNC: 56 U/L (ref 30–110)
ANION GAP SERPL CALC-SCNC: 5 MMOL/L (ref 5–19)
APTT BLD: 28.3 SEC (ref 23.5–35.8)
ARTERIAL BLOOD FIO2: (no result)
ARTERIAL BLOOD FIO2: (no result)
ARTERIAL BLOOD H2CO3: 1.06 MMOL/L (ref 1.05–1.35)
ARTERIAL BLOOD H2CO3: 1.25 MMOL/L (ref 1.05–1.35)
ARTERIAL BLOOD HCO3: 25.1 MMOL/L (ref 20–24)
ARTERIAL BLOOD HCO3: 28.1 MMOL/L (ref 20–24)
ARTERIAL BLOOD PCO2: 35.2 MMHG (ref 35–45)
ARTERIAL BLOOD PCO2: 41.6 MMHG (ref 35–45)
ARTERIAL BLOOD PH: 7.4 (ref 7.35–7.45)
ARTERIAL BLOOD PH: 7.52 (ref 7.35–7.45)
ARTERIAL BLOOD PO2: 184.4 MMHG (ref 80–100)
ARTERIAL BLOOD PO2: 39.2 MMHG (ref 80–100)
ARTERIAL BLOOD TOTAL CO2: 26.4 MMOL/L (ref 21–25)
ARTERIAL BLOOD TOTAL CO2: 29.2 MMOL/L (ref 21–25)
AST SERPL-CCNC: 95 U/L (ref 14–36)
BASE EXCESS BLDA CALC-SCNC: 0.3 MMOL/L
BASE EXCESS BLDA CALC-SCNC: 5.3 MMOL/L
BASOPHILS # BLD AUTO: 0.1 10^3/UL (ref 0–0.2)
BASOPHILS NFR BLD AUTO: 0.8 % (ref 0–2)
BILIRUB DIRECT SERPL-MCNC: 0.4 MG/DL (ref 0–0.4)
BILIRUB SERPL-MCNC: 1.1 MG/DL (ref 0.2–1.3)
BUN SERPL-MCNC: 5 MG/DL (ref 7–20)
CALCIUM: 8.9 MG/DL (ref 8.4–10.2)
CHLORIDE SERPL-SCNC: 96 MMOL/L (ref 98–107)
CK MB SERPL-MCNC: 0.41 NG/ML (ref ?–4.55)
CK MB SERPL-MCNC: 0.87 NG/ML (ref ?–4.55)
CK SERPL-CCNC: 105 U/L (ref 30–135)
CO2 SERPL-SCNC: 36 MMOL/L (ref 22–30)
CRP SERPL-MCNC: 472 MG/L (ref ?–10)
D DIMER PPP FEU-MCNC: 11.46 UG/ML (ref 0–0.5)
EOSINOPHIL # BLD AUTO: 0 10^3/UL (ref 0–0.6)
EOSINOPHIL NFR BLD AUTO: 0.6 % (ref 0–6)
ERYTHROCYTE [DISTWIDTH] IN BLOOD BY AUTOMATED COUNT: 14.3 % (ref 11.5–14)
GLUCOSE SERPL-MCNC: 136 MG/DL (ref 75–110)
HCT VFR BLD CALC: 39.2 % (ref 36–47)
HGB BLD-MCNC: 13.5 G/DL (ref 12–15.5)
INR PPP: 1.26
LYMPHOCYTES # BLD AUTO: 0.6 10^3/UL (ref 0.5–4.7)
LYMPHOCYTES NFR BLD AUTO: 7.9 % (ref 13–45)
MCH RBC QN AUTO: 28.6 PG (ref 27–33.4)
MCHC RBC AUTO-ENTMCNC: 34.4 G/DL (ref 32–36)
MCV RBC AUTO: 83 FL (ref 80–97)
MONOCYTES # BLD AUTO: 0.4 10^3/UL (ref 0.1–1.4)
MONOCYTES NFR BLD AUTO: 5 % (ref 3–13)
NEUTROPHILS # BLD AUTO: 6.9 10^3/UL (ref 1.7–8.2)
NEUTS SEG NFR BLD AUTO: 85.7 % (ref 42–78)
PHOSPHATE SERPL-MCNC: 2.7 MG/DL (ref 2.5–4.5)
PLATELET # BLD: 394 10^3/UL (ref 150–450)
POTASSIUM SERPL-SCNC: 3 MMOL/L (ref 3.6–5)
PROT SERPL-MCNC: 8.1 G/DL (ref 6.3–8.2)
PROTHROMBIN TIME: 15.9 SEC (ref 11.4–15.4)
RBC # BLD AUTO: 4.72 10^6/UL (ref 3.72–5.28)
SAO2 % BLDA: 80 % (ref 94–98)
SAO2 % BLDA: 99.3 % (ref 94–98)
TOTAL CELLS COUNTED % (AUTO): 100 %
TROPONIN I SERPL-MCNC: 0.02 NG/ML
TROPONIN I SERPL-MCNC: < 0.012 NG/ML
WBC # BLD AUTO: 8.1 10^3/UL (ref 4–10.5)

## 2020-04-10 PROCEDURE — 0BH17EZ INSERTION OF ENDOTRACHEAL AIRWAY INTO TRACHEA, VIA NATURAL OR ARTIFICIAL OPENING: ICD-10-PCS | Performed by: INTERNAL MEDICINE

## 2020-04-10 PROCEDURE — 03HY32Z INSERTION OF MONITORING DEVICE INTO UPPER ARTERY, PERCUTANEOUS APPROACH: ICD-10-PCS | Performed by: INTERNAL MEDICINE

## 2020-04-10 PROCEDURE — 4A133J1 MONITORING OF ARTERIAL PULSE, PERIPHERAL, PERCUTANEOUS APPROACH: ICD-10-PCS | Performed by: INTERNAL MEDICINE

## 2020-04-10 PROCEDURE — 05H533Z INSERTION OF INFUSION DEVICE INTO RIGHT SUBCLAVIAN VEIN, PERCUTANEOUS APPROACH: ICD-10-PCS | Performed by: RADIOLOGY

## 2020-04-10 PROCEDURE — 4A133B1 MONITORING OF ARTERIAL PRESSURE, PERIPHERAL, PERCUTANEOUS APPROACH: ICD-10-PCS | Performed by: INTERNAL MEDICINE

## 2020-04-10 PROCEDURE — 5A1955Z RESPIRATORY VENTILATION, GREATER THAN 96 CONSECUTIVE HOURS: ICD-10-PCS | Performed by: INTERNAL MEDICINE

## 2020-04-10 RX ADMIN — AZITHROMYCIN MONOHYDRATE SCH MLS/HR: 500 INJECTION, POWDER, LYOPHILIZED, FOR SOLUTION INTRAVENOUS at 20:24

## 2020-04-10 RX ADMIN — PROPOFOL PRN MLS/HR: 10 INJECTION, EMULSION INTRAVENOUS at 22:06

## 2020-04-10 RX ADMIN — OXYCODONE HYDROCHLORIDE AND ACETAMINOPHEN SCH MG: 500 TABLET ORAL at 19:16

## 2020-04-10 RX ADMIN — POTASSIUM CHLORIDE SCH MLS/HR: 29.8 INJECTION, SOLUTION INTRAVENOUS at 19:38

## 2020-04-10 RX ADMIN — MINERAL OIL AND WHITE PETROLATUM SCH APPLIC: 150; 830 OINTMENT OPHTHALMIC at 19:20

## 2020-04-10 RX ADMIN — VITAMIN D, TAB 1000IU (100/BT) SCH UNIT: 25 TAB at 11:09

## 2020-04-10 RX ADMIN — SODIUM CHLORIDE, SODIUM LACTATE, POTASSIUM CHLORIDE, AND CALCIUM CHLORIDE PRN MLS/HR: .6; .31; .03; .02 INJECTION, SOLUTION INTRAVENOUS at 11:11

## 2020-04-10 RX ADMIN — ACETAMINOPHEN PRN MG: 325 TABLET ORAL at 01:29

## 2020-04-10 RX ADMIN — PROPOFOL PRN MLS/HR: 10 INJECTION, EMULSION INTRAVENOUS at 18:00

## 2020-04-10 RX ADMIN — Medication SCH MG: at 11:09

## 2020-04-10 RX ADMIN — INSULIN LISPRO SCH: 100 INJECTION, SOLUTION INTRAVENOUS; SUBCUTANEOUS at 19:20

## 2020-04-10 RX ADMIN — ALBUTEROL SULFATE SCH: 90 AEROSOL, METERED RESPIRATORY (INHALATION) at 23:55

## 2020-04-10 RX ADMIN — OXYCODONE HYDROCHLORIDE AND ACETAMINOPHEN SCH MG: 500 TABLET ORAL at 11:09

## 2020-04-10 RX ADMIN — HYDROXYCHLOROQUINE SULFATE SCH MG: 200 TABLET, FILM COATED ORAL at 11:09

## 2020-04-10 RX ADMIN — ENOXAPARIN SODIUM SCH MG: 100 INJECTION SUBCUTANEOUS at 22:37

## 2020-04-10 RX ADMIN — OXYCODONE HYDROCHLORIDE AND ACETAMINOPHEN SCH: 500 TABLET ORAL at 19:16

## 2020-04-10 RX ADMIN — PROPOFOL PRN MLS/HR: 10 INJECTION, EMULSION INTRAVENOUS at 12:36

## 2020-04-10 RX ADMIN — POTASSIUM CHLORIDE SCH MLS/HR: 29.8 INJECTION, SOLUTION INTRAVENOUS at 21:13

## 2020-04-10 RX ADMIN — ALBUTEROL SULFATE SCH: 90 AEROSOL, METERED RESPIRATORY (INHALATION) at 20:16

## 2020-04-10 RX ADMIN — HYDROXYCHLOROQUINE SULFATE SCH MG: 200 TABLET, FILM COATED ORAL at 19:43

## 2020-04-10 RX ADMIN — HYDROMORPHONE HYDROCHLORIDE PRN MLS/HR: 2 INJECTION INTRAMUSCULAR; INTRAVENOUS; SUBCUTANEOUS at 19:47

## 2020-04-10 RX ADMIN — MINERAL OIL AND WHITE PETROLATUM SCH APPLIC: 150; 830 OINTMENT OPHTHALMIC at 22:37

## 2020-04-10 RX ADMIN — ENOXAPARIN SODIUM SCH MG: 40 INJECTION SUBCUTANEOUS at 11:08

## 2020-04-10 RX ADMIN — INSULIN LISPRO SCH UNIT: 100 INJECTION, SOLUTION INTRAVENOUS; SUBCUTANEOUS at 22:36

## 2020-04-10 RX ADMIN — INSULIN LISPRO SCH: 100 INJECTION, SOLUTION INTRAVENOUS; SUBCUTANEOUS at 13:30

## 2020-04-10 RX ADMIN — INSULIN LISPRO SCH: 100 INJECTION, SOLUTION INTRAVENOUS; SUBCUTANEOUS at 07:49

## 2020-04-10 NOTE — PROGRESS NOTE
Provider Note


Provider Note: 





Rapid response note:











Rapid response called overhead and I responded to this immediately.  Went to 

coronavirus unit and dressed out completely and PPE as appropriate, discussed 

the case with nurses and respiratory therapy who informed me that patient began 

having desaturations this morning into the 50s and was placed on nonrebreather 

at 15 L and oxygen saturation only came up to 87%.  Rapid response was called.  

Upon arrival to the room, patient appears to be rather tachypneic and in mild 

distress.





General: Mild distress, working hard to breathe


Lungs: Mild crackles possibly worse on the left but PPE and poor quality 

stethoscope severely limits auscultatory exam


CV: Tachycardic, regular rhythm, no murmurs heard


GI: Normal bowel sounds, soft, nontender


Neuro: Awake and alert, cannot assess orientation due to breathing difficulties





Plan:


Transfer to ICU immediately


Anesthesia and critical care contacted as patient will need to be emergently 

intubated


Discussed the case with Dr. Mills who agrees with transfer to ICU and 

intubation.  Appreciate his help with this critically ill patient


Continue Plaquenil/azithromycin/zinc/supplements


Proning is appropriate


ABG and other general labs ordered


We will call family to inform them of acute change


Chest x-ray ordered





Critical care time spent: 35 minutes

## 2020-04-10 NOTE — OPERATIVE REPORT
Bedside Procedure





- History of Present Illness


History of Present Illness: 


SAROJ SORIANO is a 70 year old female with past medical history as of 

essential hypertension, asthma and T2DM who originally presented to emergency 

room after 4-days of progressive fever/chills/shortness of breath/dry 

cough/fatigue/generalized weakness/loose stools which began while she was in 

[place] visiting with family for her brother's . Noted that she flew out

on 3/16 and returned on , with mild but present symptoms prior to her 

departure. Had low-grade fevers, tachycardia, tachypnea, shortness of breath, 

lymphopenia with normal WBC on admission. 


No family members or other people around her have also been ill.   In ED, 

patient notably , tolerating room air without supplemental oxygen.  CTA of chest

was done which was significant for multifocal pneumonia and radiologist 

specifically mentioned patient results highly consistent with covid-19 

pneumonia.  Patient admitted to coronavirus floor.  SARS, 2-CoViD19 testing was 

positive.





She became more hypoxic and in distress today and a rapid response was called.  

We brought her down to the ICU with a protected head covering for covert.


She was in extreme respiratory distress with tachypnea as high as 40-50.  She 

endorsed that she was tiring.  She was extremely tachycardic with a heart rate 

of 120-130s blood pressure 140-170 systolic.


She agreed to mechanical ventilation and required intubation using a plastic 

protective bridges and glide scope.


Her heart rate and respiratory rate improved dramatically however she remained 

hypoxic and she was placed in prone position.








               Procedure


Preprocedure diagnosis: SARS, 2-CoViD19 with acute hypoxic respiratory failure


Postprocedure diagnosis: Same


Procedure: Glide scope intubation emergent under protective bridges


[facility]


Complications: None


Blood loss: None








Patient was appropriate identified secondary to ongoing critical care, chart 

check and name bracelet.  


She gave verbal consent prior to intubation.


Because of the patient's SARS, 2-CoViD19 status all personnel had protective PPE

and a protective bridges to prevent airborne particles was placed over the 

patient's head.


Given 100 mcg of fentanyl followed by rapid sequence intubation with etomidate 

and rocuronium.


As an glide scope she was successfully intubated with a 7/2 ET tube and placed 

at 23 cm.  Good bilateral breath sounds were heard.  Prior to this elevated PEEP

pressures were noted and there was poor chest movement on the left which brought

the tube from 25 to 23 cm.


This was a grade 1 view on glide scope


X-ray shows tip 2 to 3 cm above ale in appropriate position.





Tolerated procedure well


No complications





Procedure excludes critical care time





Indication for Procedure: SARS, 2-CoViD19 acute hypoxic respiratory failure


Date: 04/10/20


Provider: AMANDA BREAUX

## 2020-04-10 NOTE — OPERATIVE REPORT
Bedside Procedure





- History of Present Illness


History of Present Illness: 


SAROJ SORIANO is a 70 year old female with past medical history as of 

essential hypertension, asthma and T2DM who originally presented to emergency 

room after 4-days of progressive fever/chills/shortness of breath/dry 

cough/fatigue/generalized weakness/loose stools which began while she was in 

[place] visiting with family for her brother's . Noted that she flew out

on 3/16 and returned on , with mild but present symptoms prior to her 

departure. Had low-grade fevers, tachycardia, tachypnea, shortness of breath, 

lymphopenia with normal WBC on admission. 


No family members or other people around her have also been ill.   In ED, 

patient notably , tolerating room air without supplemental oxygen.  CTA of chest

was done which was significant for multifocal pneumonia and radiologist 

specifically mentioned patient results highly consistent with covid-19 

pneumonia.  Patient admitted to coronavirus floor.  SARS, 2-CoViD19 testing was 

positive.





She became more hypoxic and in distress today and a rapid response was called.  

We brought her down to the ICU with a protected head covering for covert.


She was in extreme respiratory distress with tachypnea as high as 40-50.  She 

endorsed that she was tiring.  She was extremely tachycardic with a heart rate 

of 120-130s blood pressure 140-170 systolic.


She agreed to mechanical ventilation and required intubation using a plastic 

protective bridges and glide scope.


Her heart rate and respiratory rate improved dramatically however she remained 

hypoxic and she was placed in prone position.








               Procedure


P





Indication for Procedure: SARS, 2-CoViD19 acute hypoxic respiratory failure


Date: 04/10/20


Provider: AMANDA BREAUX





- Additional Procedures


  ** Arterial Line


Time performed: 13:00


Notes: 








Preprocedure diagnosis: SARS, 2-CoViD19 with acute hypoxic respiratory failure


Postprocedure diagnosis: Name


Procedure: Placement of right axillary artery catheter


Proceduralist: Gene PAYNE [facility]


Complications: None


Blood loss: 5 to 10 cc


Dynamic ultrasound guidance used under sterile cover





Patient was appropriate identified secondary to ongoing critical care, chart 

check anf name bracelet.  Consent way secondary to the emergent nature and 

patient is already been intubated family not available.


The right axillary region was prepped and draped with chlorhexidine using 

regional sterile barriers as well as sterile gloves. 


A Seldinger needle was placed under dynamic ultrasound guidance and good pul

satile blood flow was noted.


After this, a wire was placed without difficulty and the needle was removed.


An 18-gauge-gauge long catheter was placed over wire using typical Seldinger 

technique.


The wire was removed and 2-3 heartbeats occurred to clear debris from the 

catheter.


A transducer tube was attached after the catheter was sutured in place.


Reapplication of chlorhexidine occurred and was allowed to dry.  A sterile 

dressing was then applied.





Tolerated procedure well


No complications





Procedure excludes critical care time

## 2020-04-10 NOTE — OPERATIVE REPORT
Bedside Procedure





- History of Present Illness


History of Present Illness: 


SAROJ SORIANO is a 70 year old female with past medical history as of 

essential hypertension, asthma and T2DM who originally presented to emergency 

room after 4-days of progressive fever/chills/shortness of breath/dry 

cough/fatigue/generalized weakness/loose stools which began while she was in 

[place] visiting with family for her brother's . Noted that she flew out

on 3/16 and returned on , with mild but present symptoms prior to her 

departure. Had low-grade fevers, tachycardia, tachypnea, shortness of breath, 

lymphopenia with normal WBC on admission. 


No family members or other people around her have also been ill.   In ED, 

patient notably , tolerating room air without supplemental oxygen.  CTA of chest

was done which was significant for multifocal pneumonia and radiologist 

specifically mentioned patient results highly consistent with covid-19 

pneumonia.  Patient admitted to coronavirus floor.  SARS, 2-CoViD19 testing was 

positive.





She became more hypoxic and in distress today and a rapid response was called.  

We brought her down to the ICU with a protected head covering for covert.


She was in extreme respiratory distress with tachypnea as high as 40-50.  She 

endorsed that she was tiring.  She was extremely tachycardic with a heart rate 

of 120-130s blood pressure 140-170 systolic.


She agreed to mechanical ventilation and required intubation using a plastic 

protective bridges and glide scope.


Her heart rate and respiratory rate improved dramatically however she remained 

hypoxic and she was placed in prone position.





A extensive review of systems could not be done secondary to her condition.





The patient presents with COVID-19 CDC guideline data points:





The patient presents with confirmed COVID-19 with associated symptoms of [fever,

 dry cough, SOB, anorexia, or diarrhea], complicated by this/these comor

bidities:








ASSESSMENT:


COVID-19 positive test (U07.1, COVID-19) with Acute Respiratory Failure (J96.00,

Acute respiratory failure)





COVID-19 positive test (U07.1, COVID-19) with Acute Pneumonia (J12.89, Other 

viral pneumonia)


(If respiratory failure or sepsis present, add as separate assessment)











COVID-19 positive test (U07.1, COVID-19) with Viral Sepsis (A41.89 other 

specified sepsis)


(If respiratory failure present, add as separate assessment)


 





Indication for Procedure: Hypoxic respiratory failure with SARS, 2-CoViD19 

quiring prone positioning


Date: 04/10/20


Provider: AMANDA BREAUX





- Central Line


  ** Left Internal jugular


Time completed: 14:00


Consent obtained: Yes


Central line pre-insertion: Sterile PPE donned, Chloraprep applied, Sterile 

drapes applied, Other - SARS, 2-CoViD19 PPE


Central line lumen type: Triple


Ultrasound guided: Yes


CM at insertion site: 17


Line secured with sutures: Yes


Central line post-insertion: Blood return from lumens, Biopatch applied, 

Sutured, Sterile dressing applied, Position confirmed w/ CXR, Other


Number of attempts: 2


Complications: Yes - Right IJ attempt caused inadvertent carotid puncture. 


Notes: 





04/10/20 17:23


Patient had been intubated secondary to acute respiratory failure.


Attempt at right internal jugular was complicated by an inadvertent puncture of 

the carotid artery which resulted in a small subcutaneous hematoma which would 

not allow for internal jugular placement.  Repeat ultrasound shows no AV 

fistula.


No hematoma noted slight pressure held with the ultrasound which allowed for 

pulsatile blood flow but reduce hematoma and bleeding.

## 2020-04-10 NOTE — PROGRESS NOTE
Provider Note


Provider Note: 





Tried to call grandson to inform him of his grandmothers acute decline, no 

answer.  Will try again later.

## 2020-04-10 NOTE — RADIOLOGY REPORT (SQ)
EXAM DESCRIPTION:  CHEST SINGLE VIEW



IMAGES COMPLETED DATE/TIME:  4/10/2020 3:20 pm



REASON FOR STUDY:  Line and ETT placement



COMPARISON:  4/5/2020



EXAM PARAMETERS:  NUMBER OF VIEWS:  One view

TECHNIQUE:  Single frontal radiograph of the chest.

RADIATION DOSE: N/A

LIMITATIONS: None.



FINDINGS:  TEMPORARY SUPPORT DEVICES:ETT in expected location.  NG tube courses below the jose-diaphr
agm in to the stomach. Venous access catheter tip via left IJ approach.  Tip is in the right subclavi
an vein.

LUNGS AND PLEURA: Diffuse peripheral parenchymal opacities throughout both lungs. No effusions.  No m
asses. No pneumothorax.

MEDIASTINUM AND HILAR STRUCTURES: No masses.  Contour normal.

HEART AND VASCULAR STRUCTURES: Heart slightly enlarged vascular congestion. Aorta normal for age.

BONES: No acute findings.

OTHER: No other significant finding.



IMPRESSION:  Diffuse peripheral parenchymal opacities in both lungs.  Vascular congestion.

ET tube and nasogastric tube as expected.  Venous access catheter tip crosses midline nodes in the ri
ght subclavian.



TECHNICAL DOCUMENTATION:  JOB ID:  [id number]

 2011 [employer]- All Rights Reserved



Reading location - IP/workstation name: [email or web address]

## 2020-04-11 LAB
ADD MANUAL DIFF: NO
APTT BLD: 41.7 SEC (ref 23.5–35.8)
ARTERIAL BLOOD FIO2: (no result)
ARTERIAL BLOOD FIO2: (no result)
ARTERIAL BLOOD H2CO3: 1.3 MMOL/L (ref 1.05–1.35)
ARTERIAL BLOOD H2CO3: 1.53 MMOL/L (ref 1.05–1.35)
ARTERIAL BLOOD HCO3: 26 MMOL/L (ref 20–24)
ARTERIAL BLOOD HCO3: 29.5 MMOL/L (ref 20–24)
ARTERIAL BLOOD PCO2: 43.1 MMHG (ref 35–45)
ARTERIAL BLOOD PCO2: 50.8 MMHG (ref 35–45)
ARTERIAL BLOOD PH: 7.38 (ref 7.35–7.45)
ARTERIAL BLOOD PH: 7.4 (ref 7.35–7.45)
ARTERIAL BLOOD PO2: 59.6 MMHG (ref 80–100)
ARTERIAL BLOOD PO2: 66.4 MMHG (ref 80–100)
ARTERIAL BLOOD TOTAL CO2: 27.3 MMOL/L (ref 21–25)
ARTERIAL BLOOD TOTAL CO2: 31.1 MMOL/L (ref 21–25)
BASE EXCESS BLDA CALC-SCNC: 0.9 MMOL/L
BASE EXCESS BLDA CALC-SCNC: 3.5 MMOL/L
BASOPHILS # BLD AUTO: 0 10^3/UL (ref 0–0.2)
BASOPHILS NFR BLD AUTO: 0 % (ref 0–2)
CK MB SERPL-MCNC: 0.47 NG/ML (ref ?–4.55)
CK SERPL-CCNC: 46 U/L (ref 30–135)
CRP SERPL-MCNC: 428 MG/L (ref ?–10)
D DIMER PPP FEU-MCNC: 14.76 UG/ML (ref 0–0.5)
EOSINOPHIL # BLD AUTO: 0.1 10^3/UL (ref 0–0.6)
EOSINOPHIL NFR BLD AUTO: 1 % (ref 0–6)
ERYTHROCYTE [DISTWIDTH] IN BLOOD BY AUTOMATED COUNT: 14.4 % (ref 11.5–14)
ERYTHROCYTE [DISTWIDTH] IN BLOOD BY AUTOMATED COUNT: 14.4 % (ref 11.5–14)
FERRITIN SERPL-MCNC: 725 NG/ML (ref 11.1–264)
HCT VFR BLD CALC: 29.4 % (ref 36–47)
HCT VFR BLD CALC: 32 % (ref 36–47)
HGB BLD-MCNC: 10.1 G/DL (ref 12–15.5)
HGB BLD-MCNC: 10.8 G/DL (ref 12–15.5)
INR PPP: 1.49
LYMPHOCYTES # BLD AUTO: 0.8 10^3/UL (ref 0.5–4.7)
LYMPHOCYTES NFR BLD AUTO: 6.9 % (ref 13–45)
MCH RBC QN AUTO: 28.1 PG (ref 27–33.4)
MCH RBC QN AUTO: 28.5 PG (ref 27–33.4)
MCHC RBC AUTO-ENTMCNC: 33.7 G/DL (ref 32–36)
MCHC RBC AUTO-ENTMCNC: 34.2 G/DL (ref 32–36)
MCV RBC AUTO: 83 FL (ref 80–97)
MCV RBC AUTO: 84 FL (ref 80–97)
MONOCYTES # BLD AUTO: 0.3 10^3/UL (ref 0.1–1.4)
MONOCYTES NFR BLD AUTO: 2.8 % (ref 3–13)
NEUTROPHILS # BLD AUTO: 10.7 10^3/UL (ref 1.7–8.2)
NEUTS SEG NFR BLD AUTO: 89.3 % (ref 42–78)
PHOSPHATE SERPL-MCNC: 3.2 MG/DL (ref 2.5–4.5)
PLATELET # BLD: 238 10^3/UL (ref 150–450)
PLATELET # BLD: 267 10^3/UL (ref 150–450)
PROTHROMBIN TIME: 18.2 SEC (ref 11.4–15.4)
RBC # BLD AUTO: 3.53 10^6/UL (ref 3.72–5.28)
RBC # BLD AUTO: 3.83 10^6/UL (ref 3.72–5.28)
SAO2 % BLDA: 90.1 % (ref 94–98)
SAO2 % BLDA: 93.1 % (ref 94–98)
TOTAL CELLS COUNTED % (AUTO): 100 %
TROPONIN I SERPL-MCNC: < 0.012 NG/ML
WBC # BLD AUTO: 11.4 10^3/UL (ref 4–10.5)
WBC # BLD AUTO: 13.1 10^3/UL (ref 4–10.5)

## 2020-04-11 PROCEDURE — 06HM33Z INSERTION OF INFUSION DEVICE INTO RIGHT FEMORAL VEIN, PERCUTANEOUS APPROACH: ICD-10-PCS | Performed by: INTERNAL MEDICINE

## 2020-04-11 PROCEDURE — 03HY32Z INSERTION OF MONITORING DEVICE INTO UPPER ARTERY, PERCUTANEOUS APPROACH: ICD-10-PCS

## 2020-04-11 RX ADMIN — DEXMEDETOMIDINE HYDROCHLORIDE PRN MLS/HR: 4 INJECTION, SOLUTION INTRAVENOUS at 18:04

## 2020-04-11 RX ADMIN — Medication SCH MG: at 11:38

## 2020-04-11 RX ADMIN — PROPOFOL PRN MLS/HR: 10 INJECTION, EMULSION INTRAVENOUS at 07:27

## 2020-04-11 RX ADMIN — HYDROCORTISONE SODIUM SUCCINATE SCH MG: 100 INJECTION, POWDER, FOR SOLUTION INTRAMUSCULAR; INTRAVENOUS at 21:26

## 2020-04-11 RX ADMIN — Medication SCH: at 21:27

## 2020-04-11 RX ADMIN — ALBUTEROL SULFATE SCH: 90 AEROSOL, METERED RESPIRATORY (INHALATION) at 18:24

## 2020-04-11 RX ADMIN — OXYCODONE HYDROCHLORIDE AND ACETAMINOPHEN SCH MG: 500 TABLET ORAL at 11:41

## 2020-04-11 RX ADMIN — CEFEPIME SCH MLS/HR: 2 INJECTION, POWDER, FOR SOLUTION INTRAMUSCULAR; INTRAVENOUS at 21:25

## 2020-04-11 RX ADMIN — OSELTAMIVIR PHOSPHATE SCH ML: 6 FOR SUSPENSION ORAL at 11:45

## 2020-04-11 RX ADMIN — LINEZOLID SCH MLS/HR: 600 INJECTION, SOLUTION INTRAVENOUS at 17:55

## 2020-04-11 RX ADMIN — ENOXAPARIN SODIUM SCH MG: 100 INJECTION SUBCUTANEOUS at 21:25

## 2020-04-11 RX ADMIN — INSULIN LISPRO SCH UNIT: 100 INJECTION, SOLUTION INTRAVENOUS; SUBCUTANEOUS at 21:50

## 2020-04-11 RX ADMIN — HYDROXYCHLOROQUINE SULFATE SCH MG: 200 TABLET, FILM COATED ORAL at 18:24

## 2020-04-11 RX ADMIN — HYDROCORTISONE SODIUM SUCCINATE SCH: 100 INJECTION, POWDER, FOR SOLUTION INTRAMUSCULAR; INTRAVENOUS at 18:05

## 2020-04-11 RX ADMIN — ALBUTEROL SULFATE SCH: 90 AEROSOL, METERED RESPIRATORY (INHALATION) at 23:38

## 2020-04-11 RX ADMIN — Medication SCH MG: at 21:50

## 2020-04-11 RX ADMIN — AZITHROMYCIN MONOHYDRATE SCH MLS/HR: 500 INJECTION, POWDER, LYOPHILIZED, FOR SOLUTION INTRAVENOUS at 17:55

## 2020-04-11 RX ADMIN — MINERAL OIL AND WHITE PETROLATUM SCH APPLIC: 150; 830 OINTMENT OPHTHALMIC at 06:12

## 2020-04-11 RX ADMIN — ENOXAPARIN SODIUM SCH MG: 100 INJECTION SUBCUTANEOUS at 11:33

## 2020-04-11 RX ADMIN — INSULIN LISPRO SCH: 100 INJECTION, SOLUTION INTRAVENOUS; SUBCUTANEOUS at 12:00

## 2020-04-11 RX ADMIN — MINERAL OIL AND WHITE PETROLATUM SCH APPLIC: 150; 830 OINTMENT OPHTHALMIC at 18:06

## 2020-04-11 RX ADMIN — ALBUTEROL SULFATE SCH: 90 AEROSOL, METERED RESPIRATORY (INHALATION) at 05:33

## 2020-04-11 RX ADMIN — HYDROXYCHLOROQUINE SULFATE SCH MG: 200 TABLET, FILM COATED ORAL at 11:48

## 2020-04-11 RX ADMIN — DEXMEDETOMIDINE HYDROCHLORIDE PRN MLS/HR: 4 INJECTION, SOLUTION INTRAVENOUS at 13:45

## 2020-04-11 RX ADMIN — PROPOFOL PRN MLS/HR: 10 INJECTION, EMULSION INTRAVENOUS at 02:27

## 2020-04-11 RX ADMIN — LINEZOLID SCH MLS/HR: 600 INJECTION, SOLUTION INTRAVENOUS at 06:14

## 2020-04-11 RX ADMIN — INSULIN LISPRO SCH: 100 INJECTION, SOLUTION INTRAVENOUS; SUBCUTANEOUS at 11:48

## 2020-04-11 RX ADMIN — MINERAL OIL AND WHITE PETROLATUM SCH APPLIC: 150; 830 OINTMENT OPHTHALMIC at 21:25

## 2020-04-11 RX ADMIN — HYDROMORPHONE HYDROCHLORIDE PRN MLS/HR: 2 INJECTION INTRAMUSCULAR; INTRAVENOUS; SUBCUTANEOUS at 11:25

## 2020-04-11 RX ADMIN — CEFEPIME SCH MLS/HR: 2 INJECTION, POWDER, FOR SOLUTION INTRAMUSCULAR; INTRAVENOUS at 11:29

## 2020-04-11 RX ADMIN — PROPOFOL PRN MLS/HR: 10 INJECTION, EMULSION INTRAVENOUS at 11:20

## 2020-04-11 RX ADMIN — VITAMIN D, TAB 1000IU (100/BT) SCH UNIT: 25 TAB at 11:39

## 2020-04-11 RX ADMIN — ALBUTEROL SULFATE SCH: 90 AEROSOL, METERED RESPIRATORY (INHALATION) at 11:49

## 2020-04-11 RX ADMIN — OXYCODONE HYDROCHLORIDE AND ACETAMINOPHEN SCH MG: 500 TABLET ORAL at 18:20

## 2020-04-11 RX ADMIN — INSULIN LISPRO SCH UNIT: 100 INJECTION, SOLUTION INTRAVENOUS; SUBCUTANEOUS at 18:24

## 2020-04-11 RX ADMIN — OSELTAMIVIR PHOSPHATE SCH ML: 6 FOR SUSPENSION ORAL at 21:50

## 2020-04-11 RX ADMIN — DEXMEDETOMIDINE HYDROCHLORIDE PRN MLS/HR: 4 INJECTION, SOLUTION INTRAVENOUS at 21:52

## 2020-04-11 NOTE — OPERATIVE REPORT
Bedside Procedure





- History of Present Illness


History of Present Illness: 





SAROJ SORIANO is a 70 year old female with past medical history as of 

essential hypertension, asthma and T2DM who originally presented to emergency 

room after 4-days of progressive fever/chills/shortness of breath/dry 

cough/fatigue/generalized weakness/loose stools which began while she was in 

[place] visiting with family for her brother's . Noted that she flew out

on 3/16 and returned on , with mild but present symptoms prior to her 

departure. Had low-grade fevers, tachycardia, tachypnea, shortness of breath, 

lymphopenia with normal WBC on admission. 


No family members or other people around her have also been ill.   In ED, 

patient notably , tolerating room air without supplemental oxygen.  CTA of chest

was done which was significant for multifocal pneumonia and radiologist 

specifically mentioned patient results highly consistent with covid-19 

pneumonia.  Patient admitted to coronavirus floor.  SARS, 2-CoViD19 testing was 

positive.





She became more hypoxic and in distress today and a rapid response was called.  

We brought her down to the ICU with a protected head covering for covert.


She was in extreme respiratory distress with tachypnea as high as 40-50.  She 

endorsed that she was tiring.  She was extremely tachycardic with a heart rate 

of 120-130s blood pressure 140-170 systolic.


She agreed to mechanical ventilation and required intubation using a plastic 

protective bridges and glide scope.


Her heart rate and respiratory rate improved dramatically however she remained 

hypoxic and she was placed in prone position.





Indication for Procedure: Dysfunctional central line


Date: 04/10/20


Provider: AMANDA BREAUX





- Central Line


  ** Right Femoral


Time completed: 13:00


Consent obtained: No - Emergent


Central line pre-insertion: Sterile PPE donned, Chloraprep applied, Sterile 

drapes applied


Central line lumen type: Triple


Ultrasound guided: Yes


CM at insertion site: 20


Line secured with sutures: Yes


Central line post-insertion: Blood return from lumens, Biopatch applied, 

Sutured, Sterile dressing applied, Other - Nerved femoral vein on ultrasound


Number of attempts: 1


Complications: No


Notes: 





20 14:53


Note that patient had a left internal jugular central venous catheter placed 

yesterday that transition from the internal jugular over to the right 

subclavian.  It was pulled back into the brachiocephalic but appear to have 

become more dysfunctional and it was pulled back further.  It had been working 

well however the patient awoke and attempted to raise her head from prone 

position.  At this point we had difficulty with her blood pressure and fluids 

that were given appear to have extravasated into her neck.  We were unable to 

place an right internal jugular and because of the fear of injury to both veins 

we elected to place this catheter in the femoral region which was clean.  

Patient tolerated procedure well estimated blood loss 5 to 8 cc

## 2020-04-11 NOTE — PDOC CRITICAL CARE PROG REPORT
General


Date:: 04/11/20


ICU Day:: 2


Ventilator Day:: 2


Hospital Day:: 6


Resuscitation Status: Full Code


Medical Power of : DaughterMaryuri


Events in the past 12 to 24 Hours:: 





4.11.2020: Patient brought down from medical floor in respiratory extremis 

culminating in the need for intubation and mechanical ventilation


She was immediately placed in prone position and elevated levels of PEEP which 

resulted in improvement in her overall oxygen saturation.  Today's blood gas 

shows a PO2 which is lower than expected and I expect that this is a spurious 

result and have ordered a repeat ABG from the art line.  She developed 

hypotension presumably from the need for sedation and she was placed on 

vasopressor therapy.  Currently vasopressors at 30 on levophed with vasopressin 

ordered.  In review and examination under ultrasound I noted that the left 

internal jugular central venous catheter which had been stable had been pulled 

back and there appeared to be extravasation into the soft tissue.  She had 

awoken prior to this and was moving and this may have caused this occurring.  We

placed the vasopressor therapy into a large  antecubital vein while we prepared 

for central line.  With the thought process that the left internal jugular may 

be a concern subclavian was not considered because we were concerned that there 

would be thrombosis of the upper venous system.  Like to do place the catheter 

in the femoral vein however we had to reposition her and place her in supine 

position because of her instability.  While in supine position a basic critical 

care ultrasound of the heart showed an intact EF  (in fact hyperdynamic).  IVC 

was filled RV did not appear to be dysfunctional.


Given her hypotension we re-supinated her with close attention to her airway.  

She had had peak airway pressures which are elevated and I was suspicious that 

the ET tube was the cause.  With supination we had improvement in her peak 

airway pressures but not blood pressures.  Passive leg exam was done after the a

line was assured to be valid and her blood pressure improved significantly with.

 She had been ordered albumin earlier but I fearful that it extravasated into t

he soft tissue as well.  Repeat albumin was ordered.


Review of systems relevant to events:: 





4.11.2020: D-dimer continues to be elevated and patient is on Lovenox protocol. 

Other inflammatory parameters are not a significant however the neutrophile

-to-emphasized ratio is elevated.


Steroids held with the reduction in ferritin.  Acquired replacement of axillary 

arterial line after became dysfunctional.  Left internal jugular central venous 

line enters the right subclavian vein but has been pulled back.  Chest x-ray has

not been repeated secondary to SARS, 2-CoViD19 restrictive concerns.


Reason for ICU Addmission:: Acute hypoxic respiratory failure with SARS, 2-

CoViD19





- Medications:


Medications reviewed and adjusted accordingly: Yes


Vasopressors:: 





levophed and vasopressin


Sedation:: 





propofol and dilaudid with ativan bolus





Physical Exam


Vital Signs: 


                                        











Temp Pulse Resp BP Pulse Ox


 


 100.9 F H  92   14   76/61 L  100 


 


 04/11/20 06:00  04/11/20 03:28  04/11/20 06:00  04/10/20 22:16  04/11/20 06:00








                                 Intake & Output











 04/10/20 04/11/20 04/12/20





 06:59 06:59 06:59


 


Intake Total 1836 1290 100


 


Output Total 450 820 


 


Balance 1386 470 100


 


Weight 87.6 kg 92.8 kg 








                                  Weight/Height





Weight                           92.8 kg


Height                           5 ft 2 in








General appearance: PRESENT: no acute distress, morbidly obese


Exam: 





Pronated, elderly ill 70-year-old female intubated no acute distress heavily 

sedated to prevent inadvertent ET tube removal


Head exam: PRESENT: atraumatic, normocephalic, other - Evaluation of facial 

structures that can be seen showed no a acute pressure changes. Eye guard intact

with Lacri-Lube


Eye exam: PRESENT: other - Unable to evaluate secondary to prone position


Ear exam: PRESENT: normal external ear exam


Mouth exam: PRESENT: other - Unable to evaluate secondary to prone position


Neck exam: PRESENT: other - Central line in left neck appears clean dry and i

ntact.  ABSENT: lymphadenopathy


Respiratory exam: PRESENT: unlabored, other - Lung sounds not auscultated 

secondary to the confines of PPE and poor auditory capability of disposable 

stethoscope.  ABSENT: accessory muscle use, tachypnea


Cardiovascular exam: PRESENT: other - Heart sounds not auscultated secondary to 

the confines of PPE and poor auditory capability of disposable stethoscope and 

patient is prone


Pulses: PRESENT: +1 pedal pulses bilateral


Vascular exam: PRESENT: normal capillary refill


GI/Abdominal exam: PRESENT: other - Unable to examine secondary to prone 

position


Rectal exam: PRESENT: deferred


Gentrourinary exam: PRESENT: indwelling catheter


Extremities exam: ABSENT: pedal edema


Musculoskeletal exam: PRESENT: deformity, dislocation


Neurological exam: PRESENT: altered - Deeply sedated secondary to prone state 

and need for integrity of the ET tube


Psychiatric exam: PRESENT: appropriate affect


Skin exam: PRESENT: dry, intact, mottled, warm, other - Contact points during 

prone position all protected with adaptive dressing.  ABSENT: cyanosis, rash


Additional comments: 





Examination after patient was placed supine showed minimal facial swelling from 

dependence no skin changes.  All contact points were clean dry intact without 

any pressure ulcers.


Arterial line was functional and intact.  Left IJ central venous catheter was 

removed.


Tubes/Lines: PRESENT: Endotracheal Tube, Central Line, Arterial Catheter -  

Ervin type urinary catheter, orogastric tube





Laboratory/Radiographs


Laboratory Results: 


                                        





                                 04/11/20 04:45 





                                 04/10/20 11:48 





                                        











  04/10/20 04/10/20 04/10/20





  11:45 11:48 11:48


 


WBC   8.1 


 


RBC   4.72 


 


Hgb   13.5 


 


Hct   39.2 


 


MCV   83 


 


MCH   28.6 


 


MCHC   34.4 


 


RDW   14.3 H 


 


Plt Count   394 


 


Seg Neutrophils %   85.7 H 


 


Carbonic Acid  1.06  


 


HCO3/H2CO3 Ratio  26:1  


 


ABG pH  7.52 H  


 


ABG pCO2  35.2  


 


ABG pO2  39.2 L*  


 


ABG HCO3  28.1 H  


 


ABG O2 Saturation  80.0 L  


 


ABG Base Excess  5.3  


 


FiO2  100%  


 


Sodium    137.1


 


Potassium    3.0 L*


 


Chloride    96 L


 


Carbon Dioxide    36 H


 


Anion Gap    5


 


BUN    5 L


 


Creatinine    0.76


 


Est GFR ( Amer)    > 60


 


Glucose    136 H


 


Calcium    8.9


 


Phosphorus    2.7


 


Magnesium    2.4 H


 


Ferritin    814.00 H


 


Total Bilirubin    1.1


 


AST    95 H


 


Alkaline Phosphatase    108


 


C-Reactive Protein    472.0 H


 


Total Protein    8.1


 


Albumin    3.7


 


Triglycerides   


 


Amylase    56


 


Lipase    38.7














  04/10/20 04/10/20 04/11/20





  22:45 22:45 04:45


 


WBC   


 


RBC   


 


Hgb   


 


Hct   


 


MCV   


 


MCH   


 


MCHC   


 


RDW   


 


Plt Count   


 


Seg Neutrophils %   


 


Carbonic Acid   1.25 


 


HCO3/H2CO3 Ratio   20:1 


 


ABG pH   7.40 


 


ABG pCO2   41.6 


 


ABG pO2   184.4 H 


 


ABG HCO3   25.1 H 


 


ABG O2 Saturation   99.3 H 


 


ABG Base Excess   0.3 


 


FiO2   100% 


 


Sodium   


 


Potassium   


 


Chloride   


 


Carbon Dioxide   


 


Anion Gap   


 


BUN   


 


Creatinine   


 


Est GFR (African Amer)   


 


Glucose   


 


Calcium   


 


Phosphorus    3.2


 


Magnesium    2.0


 


Ferritin    725.00 H


 


Total Bilirubin   


 


AST   


 


Alkaline Phosphatase   


 


C-Reactive Protein    428.0 H


 


Total Protein   


 


Albumin   


 


Triglycerides  234 H  


 


Amylase   


 


Lipase   














  04/11/20 04/11/20





  04:45 04:45


 


WBC   11.4 H


 


RBC   3.83


 


Hgb   10.8 L D


 


Hct   32.0 L


 


MCV   84


 


MCH   28.1


 


MCHC   33.7


 


RDW   14.4 H


 


Plt Count   267


 


Seg Neutrophils %   89.3 H


 


Carbonic Acid  1.53 H 


 


HCO3/H2CO3 Ratio  19:1 


 


ABG pH  7.38 


 


ABG pCO2  50.8 H 


 


ABG pO2  59.6 L 


 


ABG HCO3  29.5 H 


 


ABG O2 Saturation  90.1 L 


 


ABG Base Excess  3.5 


 


FiO2  80% 


 


Sodium  


 


Potassium  


 


Chloride  


 


Carbon Dioxide  


 


Anion Gap  


 


BUN  


 


Creatinine  


 


Est GFR (African Amer)  


 


Glucose  


 


Calcium  


 


Phosphorus  


 


Magnesium  


 


Ferritin  


 


Total Bilirubin  


 


AST  


 


Alkaline Phosphatase  


 


C-Reactive Protein  


 


Total Protein  


 


Albumin  


 


Triglycerides  


 


Amylase  


 


Lipase  








                                        





04/05/20 18:40   Blood   Blood Culture - Final


                            NO GROWTH IN 5 DAYS





                                        











  04/05/20 04/10/20 04/10/20





  14:00 11:48 11:48


 


Creatine Kinase   105 


 


CK-MB (CK-2)    0.87


 


Troponin I    < 0.012


 


NT-Pro-B Natriuret Pep  32  














  04/10/20 04/10/20 04/11/20





  22:45 22:45 04:45


 


Creatine Kinase  60   46


 


CK-MB (CK-2)   0.41 


 


Troponin I   0.018 


 


NT-Pro-B Natriuret Pep   














  04/11/20





  04:45


 


Creatine Kinase 


 


CK-MB (CK-2)  0.47


 


Troponin I  < 0.012


 


NT-Pro-B Natriuret Pep 











Impressions: 


                                        





Chest/Abdomen CTA  04/05/20 16:08


IMPRESSION:


1. Patchy peripheral and perihilar ground-glass opacities in both lungs.  These 

are commonly reported imaging features of COVID-19   pneumonia are present. 

Other processes such as influenza pneumonia and organizing pneumonia, as can be 

seen with drug toxicity and connective tissue disease, can cause a similar 

imaging pattern. PneTyp


2. Limited evaluation of the pulmonary arteries due to contrast bolus timing.  

No large central pulmonary embolus.  Evaluation of the segmental and 

subsegmental pulmonary arteries is limited.


 








Chest X-Ray  04/10/20 00:00


IMPRESSION:  Diffuse peripheral parenchymal opacities in both lungs.  Vascular 

congestion.


ET tube and nasogastric tube as expected.  Venous access catheter tip crosses 

midline nodes in the right subclavian.


 











All labs, radiographs, diagnostic studies and EKGs were personally reviewed: Yes


In addition, reports of radiographic and diagnostic studies were read: Yes





Assessment and Plan





- Diagnosis


(1) Shock, septic


Is this a current diagnosis for this admission?: Yes   





(2) Acute hypoxemic respiratory failure


Is this a current diagnosis for this admission?: Yes   





(3) COVID-19 virus infection


Is this a current diagnosis for this admission?: Yes   





(4) Sepsis


Qualifiers: 


   Sepsis type: sepsis due to unspecified organism   Sepsis acute organ 

dysfunction status: without acute organ dysfunction   Qualified Code(s): A41.9 -

Sepsis, unspecified organism   


Is this a current diagnosis for this admission?: Yes   





(5) T2DM (type 2 diabetes mellitus)


Qualifiers: 


   Diabetes mellitus long term insulin use: without long term use   Diabetes 

mellitus complication status: without complication   Qualified Code(s): E11.9 - 

Type 2 diabetes mellitus without complications   


Is this a current diagnosis for this admission?: Yes   





(6) Lactic acidosis


Is this a current diagnosis for this admission?: Yes   


Plan Summary: 





4.11.2020: Patient has septic shock is still very ill.  


I am impressed with some improvement in her overall parameters but her d-dimer 

still elevated necessitating the use for full treatment anticoagulation.


SARS, 2-CoViD19 standards of care have not been widely developed but drawing on 

preliminary data full dose treatment especially with d-dimer greater than 1 is 

acceptable and recommended.  She has not reached a inflammatory level which 

would require steroids however her shock like state dictates a small level of 

steroid.  Have ordered Solu-Cortef.  This is also been recommended by numerous 

recommendations.


We will continue supportive care as well as nutrition.


There are significant limitations in physical exam while patient is prone and we

will attempt to alleviate these.  All contact points including face were evaluat

ed during examination to assure no pressure related ulceration.





Patient was re-supinated and reexamined.  Other than some mild facial swelling 

there were no untoward effects.  Her peak pressures on the ventilator improved 

raising our impression that the patient had some kinking of her ET tube.  Her 

oxygen saturations were stable.


We will start nutritional support and wean vasopressor therapy.  Hopefully the 

steroids will assist in improving her blood pressure.  We will add Midodrine if 

Bp does not respond. 


More and more data are suggesting that the hypoxia from this disease is more 

related to uncoupling of poor firing rings and the viruses response to 

hemoglobin attachment of oxygen.  Continue antioxidant support.





Critical Time


Critical Time (minutes): 130


Level of Care: ICU


-: 


1.  The care of a critical patient is a dynamic process.  This note is a 

representative synopsis but static in nature.  The timeframe for treatments 

given in order is not necessarily the actual time these treatments may have been

done.





2.  This patient requires critical care secondary to ongoing requirements for 

therapy not offered or safe outside the critical care environment.  Transfer to 

a lower level of care will result in altered life or limb morbidity and 

mortality.





3.  Multidisciplinary rounds completed.





4.  ABCDE bundle addressed.

## 2020-04-11 NOTE — OPERATIVE REPORT
Bedside Procedure





- History of Present Illness


History of Present Illness: 





Indication: Pressure monitoring, lab draws.


Procedure :BANDAR Ervin


Attending physician: Dr. Mills





Consent:


Emergency procedure.  Patient underwent arterial line placement earlier in the 

day which needed to be revised.





Procedure summary:


A timeout was performed.  My hands were washed immediately prior to the 

procedure.  I were surgical cap, mask with protective eyewear, sterile gown and 

sterile gloves throughout the procedure.  After an Osmar test was performed to 

ensure adequate perfusion and collateral circulation, the right wrist was 

prepped using chlorhexidine scrub and draped in sterile fashion.  A radial pulse

was identified and the wrist was positioned in the optimal position for radial 

cannulation.  Anesthesia was achieved using 1% lidocaine.  Using the radial 

arterial line kit, a needle was inserted into the radial artery.  Arterial blood

was seen to pulsate in the flash chamber.  The internal guidewire was advanced 

easily into the radial artery.  The catheter was then advanced over the wire and

the needle and wire were withdrawn.  The catheter was sutured in place.  A 

sterile OpSite was placed over the catheter at the insertion site.  The patient 

tolerated the procedure without any hemodynamic compromise.  At the time of 

procedure completion, the catheter was connected to the cardiac monitor and 

calibrated.  Appropriate waveform and blood pressure tracing was observed.  

Estimated blood loss is less than 3 cc.


Indication for Procedure: Pressure monitoring, lab draws


Date: 04/10/20


Provider: RUSSELL SHERIFF

## 2020-04-12 LAB
ADD MANUAL DIFF: NO
ANION GAP SERPL CALC-SCNC: 8 MMOL/L (ref 5–19)
ARTERIAL BLOOD FIO2: (no result)
ARTERIAL BLOOD FIO2: (no result)
ARTERIAL BLOOD H2CO3: 1.1 MMOL/L (ref 1.05–1.35)
ARTERIAL BLOOD H2CO3: 1.18 MMOL/L (ref 1.05–1.35)
ARTERIAL BLOOD HCO3: 24.9 MMOL/L (ref 20–24)
ARTERIAL BLOOD HCO3: 25.2 MMOL/L (ref 20–24)
ARTERIAL BLOOD PCO2: 36.6 MMHG (ref 35–45)
ARTERIAL BLOOD PCO2: 39.2 MMHG (ref 35–45)
ARTERIAL BLOOD PH: 7.42 (ref 7.35–7.45)
ARTERIAL BLOOD PH: 7.46 (ref 7.35–7.45)
ARTERIAL BLOOD PO2: 49.6 MMHG (ref 80–100)
ARTERIAL BLOOD PO2: 59.3 MMHG (ref 80–100)
ARTERIAL BLOOD TOTAL CO2: 26.1 MMOL/L (ref 21–25)
ARTERIAL BLOOD TOTAL CO2: 26.3 MMOL/L (ref 21–25)
BASE EXCESS BLDA CALC-SCNC: 0.5 MMOL/L
BASE EXCESS BLDA CALC-SCNC: 1.3 MMOL/L
BASOPHILS # BLD AUTO: 0 10^3/UL (ref 0–0.2)
BASOPHILS NFR BLD AUTO: 0 % (ref 0–2)
BUN SERPL-MCNC: 16 MG/DL (ref 7–20)
CALCIUM: 7.7 MG/DL (ref 8.4–10.2)
CHLORIDE SERPL-SCNC: 99 MMOL/L (ref 98–107)
CO2 SERPL-SCNC: 26 MMOL/L (ref 22–30)
CRP SERPL-MCNC: 446 MG/L (ref ?–10)
EOSINOPHIL # BLD AUTO: 0 10^3/UL (ref 0–0.6)
EOSINOPHIL NFR BLD AUTO: 0 % (ref 0–6)
ERYTHROCYTE [DISTWIDTH] IN BLOOD BY AUTOMATED COUNT: 14.3 % (ref 11.5–14)
FERRITIN SERPL-MCNC: 687 NG/ML (ref 11.1–264)
GLUCOSE SERPL-MCNC: 113 MG/DL (ref 75–110)
HCT VFR BLD CALC: 28.1 % (ref 36–47)
HGB BLD-MCNC: 9.7 G/DL (ref 12–15.5)
LYMPHOCYTES # BLD AUTO: 0.6 10^3/UL (ref 0.5–4.7)
LYMPHOCYTES NFR BLD AUTO: 5.2 % (ref 13–45)
MCH RBC QN AUTO: 29 PG (ref 27–33.4)
MCHC RBC AUTO-ENTMCNC: 34.7 G/DL (ref 32–36)
MCV RBC AUTO: 84 FL (ref 80–97)
MONOCYTES # BLD AUTO: 0.3 10^3/UL (ref 0.1–1.4)
MONOCYTES NFR BLD AUTO: 2.5 % (ref 3–13)
NEUTROPHILS # BLD AUTO: 10.1 10^3/UL (ref 1.7–8.2)
NEUTS SEG NFR BLD AUTO: 92.3 % (ref 42–78)
PHOSPHATE SERPL-MCNC: 3 MG/DL (ref 2.5–4.5)
PLATELET # BLD: 229 10^3/UL (ref 150–450)
POTASSIUM SERPL-SCNC: 3.6 MMOL/L (ref 3.6–5)
RBC # BLD AUTO: 3.36 10^6/UL (ref 3.72–5.28)
SAO2 % BLDA: 87.2 % (ref 94–98)
SAO2 % BLDA: 91.3 % (ref 94–98)
TOTAL CELLS COUNTED % (AUTO): 100 %
WBC # BLD AUTO: 11 10^3/UL (ref 4–10.5)

## 2020-04-12 RX ADMIN — MINERAL OIL AND WHITE PETROLATUM SCH APPLIC: 150; 830 OINTMENT OPHTHALMIC at 21:25

## 2020-04-12 RX ADMIN — ENOXAPARIN SODIUM SCH MG: 100 INJECTION SUBCUTANEOUS at 12:07

## 2020-04-12 RX ADMIN — MINERAL OIL AND WHITE PETROLATUM SCH APPLIC: 150; 830 OINTMENT OPHTHALMIC at 05:52

## 2020-04-12 RX ADMIN — HYDROXYCHLOROQUINE SULFATE SCH MG: 200 TABLET, FILM COATED ORAL at 12:23

## 2020-04-12 RX ADMIN — ALBUTEROL SULFATE SCH: 90 AEROSOL, METERED RESPIRATORY (INHALATION) at 18:50

## 2020-04-12 RX ADMIN — Medication SCH MG: at 12:08

## 2020-04-12 RX ADMIN — HYDROCORTISONE SODIUM SUCCINATE SCH MG: 100 INJECTION, POWDER, FOR SOLUTION INTRAMUSCULAR; INTRAVENOUS at 05:51

## 2020-04-12 RX ADMIN — Medication SCH: at 14:56

## 2020-04-12 RX ADMIN — HYDROXYCHLOROQUINE SULFATE SCH MG: 200 TABLET, FILM COATED ORAL at 18:50

## 2020-04-12 RX ADMIN — Medication SCH MG: at 21:26

## 2020-04-12 RX ADMIN — HYDROMORPHONE HYDROCHLORIDE PRN MLS/HR: 2 INJECTION INTRAMUSCULAR; INTRAVENOUS; SUBCUTANEOUS at 00:38

## 2020-04-12 RX ADMIN — SODIUM CHLORIDE, SODIUM LACTATE, POTASSIUM CHLORIDE, AND CALCIUM CHLORIDE PRN MLS/HR: .6; .31; .03; .02 INJECTION, SOLUTION INTRAVENOUS at 13:44

## 2020-04-12 RX ADMIN — Medication SCH: at 05:52

## 2020-04-12 RX ADMIN — INSULIN HUMAN SCH UNIT: 100 INJECTION, SOLUTION PARENTERAL at 23:34

## 2020-04-12 RX ADMIN — DEXMEDETOMIDINE HYDROCHLORIDE PRN MLS/HR: 4 INJECTION, SOLUTION INTRAVENOUS at 01:38

## 2020-04-12 RX ADMIN — LINEZOLID SCH MLS/HR: 600 INJECTION, SOLUTION INTRAVENOUS at 05:51

## 2020-04-12 RX ADMIN — ALBUTEROL SULFATE SCH: 90 AEROSOL, METERED RESPIRATORY (INHALATION) at 05:52

## 2020-04-12 RX ADMIN — MINERAL OIL AND WHITE PETROLATUM SCH APPLIC: 150; 830 OINTMENT OPHTHALMIC at 14:56

## 2020-04-12 RX ADMIN — VITAMIN D, TAB 1000IU (100/BT) SCH UNIT: 25 TAB at 12:07

## 2020-04-12 RX ADMIN — INSULIN HUMAN SCH UNIT: 100 INJECTION, SOLUTION PARENTERAL at 12:19

## 2020-04-12 RX ADMIN — SODIUM CHLORIDE, SODIUM LACTATE, POTASSIUM CHLORIDE, AND CALCIUM CHLORIDE PRN MLS/HR: .6; .31; .03; .02 INJECTION, SOLUTION INTRAVENOUS at 04:00

## 2020-04-12 RX ADMIN — CEFEPIME SCH MLS/HR: 2 INJECTION, POWDER, FOR SOLUTION INTRAMUSCULAR; INTRAVENOUS at 12:08

## 2020-04-12 RX ADMIN — ALBUTEROL SULFATE SCH: 90 AEROSOL, METERED RESPIRATORY (INHALATION) at 23:05

## 2020-04-12 RX ADMIN — OXYCODONE HYDROCHLORIDE AND ACETAMINOPHEN SCH MG: 500 TABLET ORAL at 18:49

## 2020-04-12 RX ADMIN — DEXMEDETOMIDINE HYDROCHLORIDE PRN MLS/HR: 4 INJECTION, SOLUTION INTRAVENOUS at 06:19

## 2020-04-12 RX ADMIN — INSULIN HUMAN SCH UNIT: 100 INJECTION, SOLUTION PARENTERAL at 18:56

## 2020-04-12 RX ADMIN — DEXMEDETOMIDINE HYDROCHLORIDE PRN MLS/HR: 4 INJECTION, SOLUTION INTRAVENOUS at 18:46

## 2020-04-12 RX ADMIN — OXYCODONE HYDROCHLORIDE AND ACETAMINOPHEN SCH MG: 500 TABLET ORAL at 12:07

## 2020-04-12 RX ADMIN — LINEZOLID SCH MLS/HR: 600 INJECTION, SOLUTION INTRAVENOUS at 18:48

## 2020-04-12 RX ADMIN — OSELTAMIVIR PHOSPHATE SCH ML: 6 FOR SUSPENSION ORAL at 21:27

## 2020-04-12 RX ADMIN — ALBUTEROL SULFATE SCH: 90 AEROSOL, METERED RESPIRATORY (INHALATION) at 12:10

## 2020-04-12 RX ADMIN — HYDROCORTISONE SODIUM SUCCINATE SCH MG: 100 INJECTION, POWDER, FOR SOLUTION INTRAMUSCULAR; INTRAVENOUS at 14:58

## 2020-04-12 RX ADMIN — AZITHROMYCIN MONOHYDRATE SCH MLS/HR: 500 INJECTION, POWDER, LYOPHILIZED, FOR SOLUTION INTRAVENOUS at 18:46

## 2020-04-12 RX ADMIN — OSELTAMIVIR PHOSPHATE SCH ML: 6 FOR SUSPENSION ORAL at 12:09

## 2020-04-12 RX ADMIN — HYDROCORTISONE SODIUM SUCCINATE SCH MG: 100 INJECTION, POWDER, FOR SOLUTION INTRAMUSCULAR; INTRAVENOUS at 21:26

## 2020-04-12 RX ADMIN — Medication SCH: at 21:26

## 2020-04-12 RX ADMIN — CEFEPIME SCH MLS/HR: 2 INJECTION, POWDER, FOR SOLUTION INTRAMUSCULAR; INTRAVENOUS at 21:25

## 2020-04-12 RX ADMIN — ENOXAPARIN SODIUM SCH MG: 100 INJECTION SUBCUTANEOUS at 21:25

## 2020-04-12 NOTE — [facility] CRITICAL CARE PROG REPORT
General


Date:: 04/12/20


ICU Day:: 3


Ventilator Day:: 3


Hospital Day:: 7


Resuscitation Status: Full Code


Medical Power of : DaughterMaryuri


Events in the past 12 to 24 Hours:: 


4.12.2020: Patient supinated yesterday without difficulty.


Patient's hypotension has now improved and she is off phase of vasopressor 

therapy.  She is sedated and the suspicion is that the propofol that was going 

through the central line in her neck may have extravasated.  There has been no 

untoward reaction of the skin. 








4.11.2020: Patient brought down from medical floor in respiratory extremis 

culminating in the need for intubation and mechanical ventilation


She was immediately placed in prone position and elevated levels of PEEP which 

resulted in improvement in her overall oxygen saturation.  Today's blood gas 

shows a PO2 which is lower than expected and I expect that this is a spurious 

result and have ordered a repeat ABG from the art line.  She developed 

hypotension presumably from the need for sedation and she was placed on 

vasopressor therapy.  Currently vasopressors at 30 on levophed with vasopressin 

ordered.  In review and examination under ultrasound I noted that the left 

internal jugular central venous catheter which had been stable had been pulled 

back and there appeared to be extravasation into the soft tissue.  She had 

awoken prior to this and was moving and this may have caused this occurring.  We

placed the vasopressor therapy into a large  antecubital vein while we prepared 

for central line.  With the thought process that the left internal jugular may 

be a concern subclavian was not considered because we were concerned that there 

would be thrombosis of the upper venous system.  Like to do place the catheter 

in the femoral vein however we had to reposition her and place her in supine 

position because of her instability.  While in supine position a basic critical 

care ultrasound of the heart showed an intact EF  (in fact hyperdynamic).  IVC 

was filled RV did not appear to be dysfunctional.


Given her hypotension we re-supinated her with close attention to her airway.  

She had had peak airway pressures which are elevated and I was suspicious that 

the ET tube was the cause.  With supination we had improvement in her peak 

airway pressures but not blood pressures.  Passive leg exam was done after the a

line was assured to be valid and her blood pressure improved significantly with.

 She had been ordered albumin earlier but I fearful that it extravasated into 

the soft tissue as well.  Repeat albumin was ordered.


Review of systems relevant to events:: 


4.12.2020: No excessive fevers have been noted.  FiO2 now down to 70%.  This 

morning's ABG may be a venous draw.








4.11.2020: D-dimer continues to be elevated and patient is on Lovenox protocol. 

Other inflammatory parameters are not a significant however the 

uwrjhhyikcm-[id number] ratio is elevated.


Steroids held with the reduction in ferritin.  Acquired replacement of axillary 

arterial line after became dysfunctional.  Left internal jugular central venous 

line enters the right subclavian vein but has been pulled back.  Chest x-ray has

not been repeated secondary to SARS, 2-CoViD19 restrictive concerns.


Reason for ICU Addmission:: Acute hypoxic respiratory failure with SARS, 2-

CoViD19





- Medications:


Vasopressors:: 


Off


Sedation:: 





Precedex and dilaudid





Physical Exam


Vital Signs: 


                                        











Temp Pulse Resp BP Pulse Ox


 


 98.8 F   75   18   76/61 L  95 


 


 04/12/20 18:00  04/11/20 22:00  04/12/20 18:00  04/10/20 22:16  04/12/20 18:00








                                 Intake & Output











 04/11/20 04/12/20 04/13/20





 06:59 06:59 06:59


 


Intake Total 1640 3589 1123


 


Output Total 


 


Balance 820 3089 -122


 


Weight 92.8 kg 93.4 kg 








                                  Weight/Height





Weight                           93.4 kg


Height                           5 ft 2 in








General appearance: PRESENT: no acute distress, cooperative, morbidly obese


Exam: 





Intubated supinated 70-year-old black female no active distress


Eye exam: PRESENT: conjunctival injection, conjunctiva pink, PERRLA.  ABSENT: 

nystagmus


Mouth exam: PRESENT: dry mucosa


Neck exam: PRESENT: tracheal deviation, other - Soft tissues of the neck less 

swollen.  No erythema.  ABSENT: JVD, lymphadenopathy, thyromegaly


Respiratory exam: PRESENT: other - Lung sounds not auscultated secondary to the 

confines of PPE and poor auditory capability of disposable stethoscope.  ABSENT:

accessory muscle use, tachypnea, unlabored


Cardiovascular exam: PRESENT: RRR, other - Heart sounds not auscultated 

secondary to the confines of PPE and poor auditory capability of disposable 

stethoscope.  ABSENT: tachycardia


Pulses: PRESENT: +1 pedal pulses bilateral


Vascular exam: PRESENT: normal capillary refill


GI/Abdominal exam: PRESENT: soft, other - Gastric sounds not auscultated 

secondary to the confines of PPE and poor auditory capability of disposable 

stethoscope.  ABSENT: ascites, firm, guarding, mass, organolmegaly, tenderness


Rectal exam: PRESENT: deferred


Gentrourinary exam: PRESENT: indwelling catheter


Extremities exam: PRESENT: pedal edema


Musculoskeletal exam: ABSENT: deformity, dislocation


Neurological exam: PRESENT: altered - Deeply sedated secondary to need for 

reducing metabolic demand


Psychiatric exam: PRESENT: appropriate affect


Skin exam: PRESENT: dry, intact, warm.  ABSENT: cyanosis, rash


Tubes/Lines: PRESENT: Endotracheal Tube, Central Line, Arterial Catheter - Oral 

gastric Tube, Ervin type urinary catheter





Laboratory/Radiographs


Laboratory Results: 


                                        





                                 04/12/20 03:17 





                                 04/12/20 03:17 





                                        











  04/12/20 04/12/20 04/12/20





  03:17 03:17 03:17


 


WBC    11.0 H


 


RBC    3.36 L


 


Hgb    9.7 L


 


Hct    28.1 L


 


MCV    84


 


MCH    29.0


 


MCHC    34.7


 


RDW    14.3 H


 


Plt Count    229


 


Seg Neutrophils %    92.3 H


 


Carbonic Acid  1.10  


 


HCO3/H2CO3 Ratio  22:1  


 


ABG pH  7.46 H  


 


ABG pCO2  36.6  


 


ABG pO2  49.6 L  


 


ABG HCO3  25.2 H  


 


ABG O2 Saturation  87.2 L  


 


ABG Base Excess  1.3  


 


FiO2  60%  


 


Sodium   


 


Potassium   


 


Chloride   


 


Carbon Dioxide   


 


Anion Gap   


 


BUN   


 


Creatinine   


 


Est GFR (African Amer)   


 


Glucose   


 


Calcium   


 


Phosphorus   3.0 


 


Magnesium   2.0 


 


Ferritin   687.00 H 


 


C-Reactive Protein   446.0 H 














  04/12/20





  03:17


 


WBC 


 


RBC 


 


Hgb 


 


Hct 


 


MCV 


 


MCH 


 


MCHC 


 


RDW 


 


Plt Count 


 


Seg Neutrophils % 


 


Carbonic Acid 


 


HCO3/H2CO3 Ratio 


 


ABG pH 


 


ABG pCO2 


 


ABG pO2 


 


ABG HCO3 


 


ABG O2 Saturation 


 


ABG Base Excess 


 


FiO2 


 


Sodium  133.2 L


 


Potassium  3.6


 


Chloride  99


 


Carbon Dioxide  26


 


Anion Gap  8


 


BUN  16


 


Creatinine  1.09


 


Est GFR (African Amer)  > 60


 


Glucose  113 H


 


Calcium  7.7 L


 


Phosphorus 


 


Magnesium 


 


Ferritin 


 


C-Reactive Protein 








                                        











  04/05/20 04/10/20 04/10/20





  14:00 11:48 11:48


 


Creatine Kinase   105 


 


CK-MB (CK-2)    0.87


 


Troponin I    < 0.012


 


NT-Pro-B Natriuret Pep  32  














  04/10/20 04/10/20 04/11/20





  22:45 22:45 04:45


 


Creatine Kinase  60   46


 


CK-MB (CK-2)   0.41 


 


Troponin I   0.018 


 


NT-Pro-B Natriuret Pep   














  04/11/20





  04:45


 


Creatine Kinase 


 


CK-MB (CK-2)  0.47


 


Troponin I  < 0.012


 


NT-Pro-B Natriuret Pep 











Impressions: 


                                        





Chest/Abdomen CTA  04/05/20 16:08


IMPRESSION:


1. Patchy peripheral and perihilar ground-glass opacities in both lungs.  These 

are commonly reported imaging features of COVID-19   pneumonia are present. 

Other processes such as influenza pneumonia and organizing pneumonia, as can be 

seen with drug toxicity and connective tissue disease, can cause a similar 

imaging pattern. PneTyp


2. Limited evaluation of the pulmonary arteries due to contrast bolus timing.  

No large central pulmonary embolus.  Evaluation of the segmental and subse

gmental pulmonary arteries is limited.


 








Chest X-Ray  04/10/20 00:00


IMPRESSION:  Diffuse peripheral parenchymal opacities in both lungs.  Vascular 

congestion.


ET tube and nasogastric tube as expected.  Venous access catheter tip crosses 

midline nodes in the right subclavian.


 











All labs, radiographs, diagnostic studies and EKGs were personally reviewed: Yes


In addition, reports of radiographic and diagnostic studies were read: Yes





Assessment and Plan





- Diagnosis


(1) Shock, septic


Is this a current diagnosis for this admission?: Yes   





(2) Acute hypoxemic respiratory failure


Is this a current diagnosis for this admission?: Yes   





(3) COVID-19 virus infection


Is this a current diagnosis for this admission?: Yes   





(4) Sepsis


Qualifiers: 


   Sepsis type: sepsis due to unspecified organism   Sepsis acute organ 

dysfunction status: without acute organ dysfunction   Qualified Code(s): A41.9 -

Sepsis, unspecified organism   


Is this a current diagnosis for this admission?: Yes   





(5) T2DM (type 2 diabetes mellitus)


Qualifiers: 


   Diabetes mellitus long term insulin use: without long term use   Diabetes 

mellitus complication status: without complication   Qualified Code(s): E11.9 - 

Type 2 diabetes mellitus without complications   


Is this a current diagnosis for this admission?: Yes   





(6) Lactic acidosis


Is this a current diagnosis for this admission?: Yes   


Plan Summary: 


4.12.2020: Patient has made some subtle improvement but is still on high FiO2.  

We will continue to provide support.


We will begin enteral tube feedings as well.


Inflammatory markers are improving significantly and will continue therapy.


D Dimer is remarkably reduced as well but will continue anti-thrombotic therapy 

to less than 1


Continue supportive care


Stop IV fluids


Watch QT interval


Vigilance for medication reactions








4.11.2020: Patient has septic shock is still very ill.  


I am impressed with some improvement in her overall parameters but her d-dimer 

still elevated necessitating the use for full treatment anticoagulation.


SARS, 2-CoViD19 standards of care have not been widely developed but drawing on 

preliminary data full dose treatment especially with d-dimer greater than 1 is 

acceptable and recommended.  She has not reached a inflammatory level which 

would require steroids however her shock like state dictates a small level of 

steroid.  Have ordered Solu-Cortef.  This is also been recommended by numerous 

recommendations.


We will continue supportive care as well as nutrition.


There are significant limitations in physical exam while patient is prone and we

will attempt to alleviate these.  All contact points including face were e

valuated during examination to assure no pressure related ulceration.





Patient was re-supinated and reexamined.  Other than some mild facial swelling 

there were no untoward effects.  Her peak pressures on the ventilator improved 

raising our impression that the patient had some kinking of her ET tube.  Her 

oxygen saturations were stable.


We will start nutritional support and wean vasopressor therapy.  Hopefully the 

steroids will assist in improving her blood pressure.  We will add Midodrine if 

Bp does not respond. 


More and more data are suggesting that the hypoxia from this disease is more rel

ated to uncoupling of poor firing rings and the viruses response to hemoglobin 

attachment of oxygen.  Continue antioxidant support.





Critical Time


Critical Time (minutes): 50


Level of Care: ICU


-: 


1.  The care of a critical patient is a dynamic process.  This note is a 

representative synopsis but static in nature.  The timeframe for treatments 

given in order is not necessarily the actual time these treatments may have been

done.





2.  This patient requires critical care secondary to ongoing requirements for 

therapy not offered or safe outside the critical care environment.  Transfer to 

a lower level of care will result in altered life or limb morbidity and 

mortality.





3.  Multidisciplinary rounds completed.





4.  ABCDE bundle addressed.

## 2020-04-12 NOTE — EKG REPORT
SEVERITY:- ABNORMAL ECG -

SINUS RHYTHM

RBBB AND LAFB

:

Confirmed by: Catina Colon MD 12-Apr-2020 09:01:00

## 2020-04-13 LAB
ABSOLUTE LYMPHOCYTES# (MANUAL): 0.2 10^3/UL (ref 0.5–4.7)
ABSOLUTE MONOCYTES # (MANUAL): 0.2 10^3/UL (ref 0.1–1.4)
ADD MANUAL DIFF: YES
ANION GAP SERPL CALC-SCNC: 9 MMOL/L (ref 5–19)
ANISOCYTOSIS BLD QL SMEAR: (no result)
ARTERIAL BLOOD FIO2: (no result)
ARTERIAL BLOOD H2CO3: 1.14 MMOL/L (ref 1.05–1.35)
ARTERIAL BLOOD HCO3: 24.7 MMOL/L (ref 20–24)
ARTERIAL BLOOD PCO2: 38 MMHG (ref 35–45)
ARTERIAL BLOOD PH: 7.43 (ref 7.35–7.45)
ARTERIAL BLOOD PO2: 53.5 MMHG (ref 80–100)
ARTERIAL BLOOD TOTAL CO2: 25.9 MMOL/L (ref 21–25)
BASE EXCESS BLDA CALC-SCNC: 0.5 MMOL/L
BASOPHILS NFR BLD MANUAL: 0 % (ref 0–2)
BUN SERPL-MCNC: 23 MG/DL (ref 7–20)
CALCIUM: 7.8 MG/DL (ref 8.4–10.2)
CHLORIDE SERPL-SCNC: 100 MMOL/L (ref 98–107)
CO2 SERPL-SCNC: 25 MMOL/L (ref 22–30)
CRP SERPL-MCNC: 310.3 MG/L (ref ?–10)
EOSINOPHIL NFR BLD MANUAL: 0 % (ref 0–6)
ERYTHROCYTE [DISTWIDTH] IN BLOOD BY AUTOMATED COUNT: 14.7 % (ref 11.5–14)
FERRITIN SERPL-MCNC: 751 NG/ML (ref 11.1–264)
GLUCOSE SERPL-MCNC: 256 MG/DL (ref 75–110)
HCT VFR BLD CALC: 28.1 % (ref 36–47)
HGB BLD-MCNC: 9.8 G/DL (ref 12–15.5)
MCH RBC QN AUTO: 28.7 PG (ref 27–33.4)
MCHC RBC AUTO-ENTMCNC: 34.7 G/DL (ref 32–36)
MCV RBC AUTO: 83 FL (ref 80–97)
MONOCYTES % (MANUAL): 2 % (ref 3–13)
NEUTS BAND NFR BLD MANUAL: 2 % (ref 3–5)
PHOSPHATE SERPL-MCNC: 3.1 MG/DL (ref 2.5–4.5)
PLATELET # BLD: 300 10^3/UL (ref 150–450)
PLATELET COMMENT: ADEQUATE
POLYCHROMASIA BLD QL SMEAR: (no result)
POTASSIUM SERPL-SCNC: 3.7 MMOL/L (ref 3.6–5)
RBC # BLD AUTO: 3.4 10^6/UL (ref 3.72–5.28)
SAO2 % BLDA: 88.8 % (ref 94–98)
SEGMENTED NEUTROPHILS % (MAN): 94 % (ref 42–78)
TOTAL CELLS COUNTED BLD: 100
VARIANT LYMPHS NFR BLD MANUAL: 2 % (ref 13–45)
WBC # BLD AUTO: 11.4 10^3/UL (ref 4–10.5)

## 2020-04-13 RX ADMIN — Medication SCH: at 21:00

## 2020-04-13 RX ADMIN — OSELTAMIVIR PHOSPHATE SCH MG: 6 FOR SUSPENSION ORAL at 10:52

## 2020-04-13 RX ADMIN — CEFEPIME SCH MLS/HR: 2 INJECTION, POWDER, FOR SOLUTION INTRAMUSCULAR; INTRAVENOUS at 10:18

## 2020-04-13 RX ADMIN — VITAMIN D, TAB 1000IU (100/BT) SCH UNIT: 25 TAB at 10:18

## 2020-04-13 RX ADMIN — ENOXAPARIN SODIUM SCH MG: 100 INJECTION SUBCUTANEOUS at 21:02

## 2020-04-13 RX ADMIN — MINERAL OIL AND WHITE PETROLATUM SCH APPLIC: 150; 830 OINTMENT OPHTHALMIC at 20:59

## 2020-04-13 RX ADMIN — LINEZOLID SCH MLS/HR: 600 INJECTION, SOLUTION INTRAVENOUS at 18:35

## 2020-04-13 RX ADMIN — ALBUTEROL SULFATE SCH: 90 AEROSOL, METERED RESPIRATORY (INHALATION) at 23:31

## 2020-04-13 RX ADMIN — OSELTAMIVIR PHOSPHATE SCH ML: 6 FOR SUSPENSION ORAL at 21:02

## 2020-04-13 RX ADMIN — INSULIN HUMAN SCH UNIT: 100 INJECTION, SOLUTION PARENTERAL at 05:45

## 2020-04-13 RX ADMIN — INSULIN HUMAN SCH UNIT: 100 INJECTION, SOLUTION PARENTERAL at 18:37

## 2020-04-13 RX ADMIN — LINEZOLID SCH MLS/HR: 600 INJECTION, SOLUTION INTRAVENOUS at 05:46

## 2020-04-13 RX ADMIN — Medication SCH MG: at 21:00

## 2020-04-13 RX ADMIN — DEXMEDETOMIDINE HYDROCHLORIDE PRN MLS/HR: 4 INJECTION, SOLUTION INTRAVENOUS at 06:49

## 2020-04-13 RX ADMIN — HYDROMORPHONE HYDROCHLORIDE PRN MLS/HR: 2 INJECTION INTRAMUSCULAR; INTRAVENOUS; SUBCUTANEOUS at 08:08

## 2020-04-13 RX ADMIN — DEXMEDETOMIDINE HYDROCHLORIDE PRN MLS/HR: 4 INJECTION, SOLUTION INTRAVENOUS at 18:20

## 2020-04-13 RX ADMIN — ALBUTEROL SULFATE SCH: 90 AEROSOL, METERED RESPIRATORY (INHALATION) at 11:06

## 2020-04-13 RX ADMIN — HYDROCORTISONE SODIUM SUCCINATE SCH MG: 100 INJECTION, POWDER, FOR SOLUTION INTRAMUSCULAR; INTRAVENOUS at 20:59

## 2020-04-13 RX ADMIN — ALBUTEROL SULFATE SCH: 90 AEROSOL, METERED RESPIRATORY (INHALATION) at 05:13

## 2020-04-13 RX ADMIN — DEXMEDETOMIDINE HYDROCHLORIDE PRN MLS/HR: 4 INJECTION, SOLUTION INTRAVENOUS at 12:23

## 2020-04-13 RX ADMIN — OXYCODONE HYDROCHLORIDE AND ACETAMINOPHEN SCH MG: 500 TABLET ORAL at 10:18

## 2020-04-13 RX ADMIN — DEXMEDETOMIDINE HYDROCHLORIDE PRN MLS/HR: 4 INJECTION, SOLUTION INTRAVENOUS at 01:58

## 2020-04-13 RX ADMIN — CEFEPIME SCH MLS/HR: 2 INJECTION, POWDER, FOR SOLUTION INTRAMUSCULAR; INTRAVENOUS at 21:00

## 2020-04-13 RX ADMIN — OXYCODONE HYDROCHLORIDE AND ACETAMINOPHEN SCH MG: 500 TABLET ORAL at 18:34

## 2020-04-13 RX ADMIN — Medication SCH: at 13:20

## 2020-04-13 RX ADMIN — DEXMEDETOMIDINE HYDROCHLORIDE PRN MLS/HR: 4 INJECTION, SOLUTION INTRAVENOUS at 23:30

## 2020-04-13 RX ADMIN — Medication SCH: at 05:13

## 2020-04-13 RX ADMIN — Medication SCH MG: at 10:19

## 2020-04-13 RX ADMIN — MINERAL OIL AND WHITE PETROLATUM SCH APPLIC: 150; 830 OINTMENT OPHTHALMIC at 13:20

## 2020-04-13 RX ADMIN — ENOXAPARIN SODIUM SCH MG: 100 INJECTION SUBCUTANEOUS at 10:17

## 2020-04-13 RX ADMIN — INSULIN HUMAN SCH UNIT: 100 INJECTION, SOLUTION PARENTERAL at 11:05

## 2020-04-13 RX ADMIN — HYDROCORTISONE SODIUM SUCCINATE SCH MG: 100 INJECTION, POWDER, FOR SOLUTION INTRAMUSCULAR; INTRAVENOUS at 13:20

## 2020-04-13 RX ADMIN — MINERAL OIL AND WHITE PETROLATUM SCH APPLIC: 150; 830 OINTMENT OPHTHALMIC at 05:46

## 2020-04-13 RX ADMIN — ALBUTEROL SULFATE SCH: 90 AEROSOL, METERED RESPIRATORY (INHALATION) at 18:34

## 2020-04-13 RX ADMIN — HYDROCORTISONE SODIUM SUCCINATE SCH MG: 100 INJECTION, POWDER, FOR SOLUTION INTRAMUSCULAR; INTRAVENOUS at 05:46

## 2020-04-13 NOTE — [facility] CRITICAL CARE PROG REPORT
General


Date:: 04/13/20


ICU Day:: 4


Ventilator Day:: 4


Hospital Day:: 8


Resuscitation Status: Full Code


Medical Power of : DaughterMaryuri


Events in the past 12 to 24 Hours:: 


4.13.2020: Patient still has significant degree of hypoxia but is improved from 

admission.  She response to PEEP and higher tidal volumes without excess peak 

pressures.


No fever.  No hypotension.


Tolerating tube feeds








4.12.2020: Patient supinated yesterday without difficulty.


Patient's hypotension has now improved and she is off phase of vasopressor 

therapy.  She is sedated and the suspicion is that the propofol that was going 

through the central line in her neck may have extravasated.  There has been no 

untoward reaction of the skin. 








4.11.2020: Patient brought down from medical floor in respiratory extremis 

culminating in the need for intubation and mechanical ventilation


She was immediately placed in prone position and elevated levels of PEEP which 

resulted in improvement in her overall oxygen saturation.  Today's blood gas 

shows a PO2 which is lower than expected and I expect that this is a spurious 

result and have ordered a repeat ABG from the art line.  She developed 

hypotension presumably from the need for sedation and she was placed on 

vasopressor therapy.  Currently vasopressors at 30 on levophed with vasopressin 

ordered.  In review and examination under ultrasound I noted that the left 

internal jugular central venous catheter which had been stable had been pulled 

back and there appeared to be extravasation into the soft tissue.  She had 

awoken prior to this and was moving and this may have caused this occurring.  We

placed the vasopressor therapy into a large  antecubital vein while we prepared 

for central line.  With the thought process that the left internal jugular may 

be a concern subclavian was not considered because we were concerned that there 

would be thrombosis of the upper venous system.  Like to do place the catheter 

in the femoral vein however we had to reposition her and place her in supine 

position because of her instability.  While in supine position a basic critical 

care ultrasound of the heart showed an intact EF  (in fact hyperdynamic).  IVC 

was filled RV did not appear to be dysfunctional.


Given her hypotension we re-supinated her with close attention to her airway.  

She had had peak airway pressures which are elevated and I was suspicious that 

the ET tube was the cause.  With supination we had improvement in her peak 

airway pressures but not blood pressures.  Passive leg exam was done after the a

line was assured to be valid and her blood pressure improved significantly with.

 She had been ordered albumin earlier but I fearful that it extravasated into 

the soft tissue as well.  Repeat albumin was ordered.


Review of systems relevant to events:: 


4.13.2020:


Evaluated ventilator pressure and waveforms.  No auto PEEP is noted.  Note that 

with the protective viral system attached to ET tube with the vent closed higher

pressures were noted.  This was opened.  Patient has a HEPA filter on the 

expiratory limb





4.12.2020: No excessive fevers have been noted.  FiO2 now down to 70%.  This 

morning's ABG may be a venous draw.








4.11.2020: D-dimer continues to be elevated and patient is on Lovenox protocol. 

Other inflammatory parameters are not a significant however the 

[id number] ratio is elevated.


Steroids held with the reduction in ferritin.  Acquired replacement of axillary 

arterial line after became dysfunctional.  Left internal jugular central venous 

line enters the right subclavian vein but has been pulled back.  Chest x-ray has

not been repeated secondary to SARS, 2-CoViD19 restrictive concerns.


Reason for ICU Addmission:: Acute hypoxic respiratory failure with SARS, 2-

CoViD19





- Medications:


Vasopressors:: 


Off


Sedation:: 





Precedex and dilaudid





Physical Exam


Vital Signs: 


                                        











Temp Pulse Resp BP Pulse Ox


 


 97.7 F   63   18   76/61 L  93 


 


 04/13/20 18:00  04/13/20 08:48  04/13/20 18:00  04/10/20 22:16  04/13/20 18:00








                                 Intake & Output











 04/12/20 04/13/20 04/14/20





 06:59 06:59 06:59


 


Intake Total 3589 2924 250


 


Output Total 500 1910 665


 


Balance 3089 1014 -415


 


Weight 93.4 kg 95 kg 95 kg








                                  Weight/Height





Weight                           95 kg


Height                           5 ft 2 in








General appearance: PRESENT: no acute distress, morbidly obese


Exam: 





Intubated nontoxic 70-year-old black female no acute distress


Eye exam: PRESENT: conjunctiva pink, PERRLA.  ABSENT: conjunctival injection, 

nystagmus, scleral icterus


Mouth exam: PRESENT: moist


Neck exam: PRESENT: other - Swelling on side of neck is improved.  ABSENT: JVD, 

lymphadenopathy, thyromegaly, tracheal deviation


Respiratory exam: PRESENT: unlabored, other - Lung sounds not auscultated 

secondary to the confines of PPE and poor auditory capability of disposable 

stethoscope.  ABSENT: accessory muscle use, tachypnea


Cardiovascular exam: PRESENT: RRR, other - Heart sounds not auscultated 

secondary to the confines of PPE and poor auditory capability of disposable 

stethoscope.  ABSENT: bradycardia, irregular rhythm, tachycardia


Pulses: PRESENT: +1 pedal pulses bilateral


GI/Abdominal exam: PRESENT: soft, other - Gastric sounds not auscultated 

secondary to the confines of PPE and poor auditory capability of disposable 

stethoscope.  ABSENT: distended, guarding, mass, organolmegaly, rebound, 

tenderness


Rectal exam: PRESENT: deferred


Gentrourinary exam: PRESENT: indwelling catheter


Extremities exam: PRESENT: pedal edema


Musculoskeletal exam: ABSENT: deformity, dislocation


Neurological exam: PRESENT: altered - Patient maintained in deep sedated state 

to prevent inadvertent extubation.  She grimaces to noxious stimulus and moves 

extremities to noxious stimulus.  No focal deficits


Psychiatric exam: PRESENT: appropriate affect


Skin exam: PRESENT: dry, intact, warm.  ABSENT: cyanosis, rash


Tubes/Lines: PRESENT: Endotracheal Tube, Central Line, Arterial Catheter, Other 

- Right femoral central venous catheter, orogastric tube, Ervin type urinary 

catheter





Laboratory/Radiographs


Laboratory Results: 


                                        





                                 04/13/20 03:55 





                                 04/13/20 18:45 





                                        











  04/12/20 04/13/20 04/13/20





  20:41 03:55 03:55


 


WBC   


 


RBC   


 


Hgb   


 


Hct   


 


MCV   


 


MCH   


 


MCHC   


 


RDW   


 


Plt Count   


 


Seg Neutrophils %   


 


Carbonic Acid  1.18  1.14 


 


HCO3/H2CO3 Ratio  21:1  21:1 


 


ABG pH  7.42  7.43 


 


ABG pCO2  39.2  38.0 


 


ABG pO2  59.3 L  53.5 L 


 


ABG HCO3  24.9 H  24.7 H 


 


ABG O2 Saturation  91.3 L  88.8 L 


 


ABG Base Excess  0.5  0.5 


 


FiO2  70%  70% 


 


Sodium   


 


Potassium   


 


Chloride   


 


Carbon Dioxide   


 


Anion Gap   


 


BUN   


 


Creatinine   


 


Est GFR (African Amer)   


 


Glucose   


 


Lactic Acid   


 


Calcium   


 


Phosphorus    3.1


 


Magnesium    2.2


 


Ferritin    751.00 H


 


C-Reactive Protein    310.3 H


 


Triglycerides   














  04/13/20 04/13/20 04/13/20





  03:55 03:55 18:45


 


WBC  11.4 H  


 


RBC  3.40 L  


 


Hgb  9.8 L  


 


Hct  28.1 L  


 


MCV  83  


 


MCH  28.7  


 


MCHC  34.7  


 


RDW  14.7 H  


 


Plt Count  300  


 


Seg Neutrophils %  Not Reportable  


 


Carbonic Acid   


 


HCO3/H2CO3 Ratio   


 


ABG pH   


 


ABG pCO2   


 


ABG pO2   


 


ABG HCO3   


 


ABG O2 Saturation   


 


ABG Base Excess   


 


FiO2   


 


Sodium    133.2 L


 


Potassium    3.7


 


Chloride    100


 


Carbon Dioxide    25


 


Anion Gap    9


 


BUN    23 H


 


Creatinine    1.06


 


Est GFR ( Amer)    > 60


 


Glucose    256 H


 


Lactic Acid   2.6 H 


 


Calcium    7.8 L


 


Phosphorus   


 


Magnesium   


 


Ferritin   


 


C-Reactive Protein   


 


Triglycerides   














  04/13/20





  18:45


 


WBC 


 


RBC 


 


Hgb 


 


Hct 


 


MCV 


 


MCH 


 


MCHC 


 


RDW 


 


Plt Count 


 


Seg Neutrophils % 


 


Carbonic Acid 


 


HCO3/H2CO3 Ratio 


 


ABG pH 


 


ABG pCO2 


 


ABG pO2 


 


ABG HCO3 


 


ABG O2 Saturation 


 


ABG Base Excess 


 


FiO2 


 


Sodium 


 


Potassium 


 


Chloride 


 


Carbon Dioxide 


 


Anion Gap 


 


BUN 


 


Creatinine 


 


Est GFR (African Amer) 


 


Glucose 


 


Lactic Acid 


 


Calcium 


 


Phosphorus 


 


Magnesium 


 


Ferritin 


 


C-Reactive Protein 


 


Triglycerides  361 H








                                        











  04/05/20 04/10/20 04/10/20





  14:00 11:48 11:48


 


Creatine Kinase   105 


 


CK-MB (CK-2)    0.87


 


Troponin I    < 0.012


 


NT-Pro-B Natriuret Pep  32  














  04/10/20 04/10/20 04/11/20





  22:45 22:45 04:45


 


Creatine Kinase  60   46


 


CK-MB (CK-2)   0.41 


 


Troponin I   0.018 


 


NT-Pro-B Natriuret Pep   














  04/11/20





  04:45


 


Creatine Kinase 


 


CK-MB (CK-2)  0.47


 


Troponin I  < 0.012


 


NT-Pro-B Natriuret Pep 











Impressions: 


                                        





Chest/Abdomen CTA  04/05/20 16:08


IMPRESSION:


1. Patchy peripheral and perihilar ground-glass opacities in both lungs.  These 

are commonly reported imaging features of COVID-19   pneumonia are present. 

Other processes such as influenza pneumonia and organizing pneumonia, as can be 

seen with drug toxicity and connective tissue disease, can cause a similar 

imaging pattern. PneTyp


2. Limited evaluation of the pulmonary arteries due to contrast bolus timing.  

No large central pulmonary embolus.  Evaluation of the segmental and 

subsegmental pulmonary arteries is limited.


 








Chest X-Ray  04/10/20 00:00


IMPRESSION:  Diffuse peripheral parenchymal opacities in both lungs.  Vascular 

congestion.


ET tube and nasogastric tube as expected.  Venous access catheter tip crosses 

midline nodes in the right subclavian.


 











All labs, radiographs, diagnostic studies and EKGs were personally reviewed: Yes


In addition, reports of radiographic and diagnostic studies were read: Yes





Assessment and Plan





- Diagnosis


(1) Shock, septic


Is this a current diagnosis for this admission?: Yes   





(2) Acute hypoxemic respiratory failure


Is this a current diagnosis for this admission?: Yes   





(3) COVID-19 virus infection


Is this a current diagnosis for this admission?: Yes   





(4) Sepsis


Qualifiers: 


   Sepsis type: sepsis due to unspecified organism   Sepsis acute organ 

dysfunction status: without acute organ dysfunction   Qualified Code(s): A41.9 -

Sepsis, unspecified organism   


Is this a current diagnosis for this admission?: Yes   





(5) T2DM (type 2 diabetes mellitus)


Qualifiers: 


   Diabetes mellitus long term insulin use: without long term use   Diabetes 

mellitus complication status: without complication   Qualified Code(s): E11.9 - 

Type 2 diabetes mellitus without complications   


Is this a current diagnosis for this admission?: Yes   





(6) Lactic acidosis


Is this a current diagnosis for this admission?: Yes   


Plan Summary: 


4.13.2020: Patient's hypoxia, although improved, is still significant.  Data 

from anecdotal information and from personal experience allows for this to be 

part of the norm for this pandemic.


She is not appear to be hemodynamically unstable at this time.  QTC is 

acceptable


Continue to monitor central line and arterial catheters for any source of 

infection on steroids and Plaquenil


Continue supportive care


Add insulin drip


Continue steroids


No evidence to support myocardial dysfunction








4.12.2020: Patient has made some subtle improvement but is still on high FiO2.  

We will continue to provide support.


We will begin enteral tube feedings as well.


Inflammatory markers are improving significantly and will continue therapy.


D Dimer is remarkably reduced as well but will continue anti-thrombotic therapy 

to less than 1


Continue supportive care


Stop IV fluids


Watch QT interval


Vigilance for medication reactions








4.11.2020: Patient has septic shock is still very ill.  


I am impressed with some improvement in her overall parameters but her d-dimer 

still elevated necessitating the use for full treatment anticoagulation.


SARS, 2-CoViD19 standards of care have not been widely developed but drawing on 

preliminary data full dose treatment especially with d-dimer greater than 1 is 

acceptable and recommended.  She has not reached a inflammatory level which 

would require steroids however her shock like state dictates a small level of 

steroid.  Have ordered Solu-Cortef.  This is also been recommended by numerous 

recommendations.


We will continue supportive care as well as nutrition.


There are significant limitations in physical exam while patient is prone and we

will attempt to alleviate these.  All contact points including face were 

evaluated during examination to assure no pressure related ulceration.





Patient was re-supinated and reexamined.  Other than some mild facial swelling 

there were no untoward effects.  Her peak pressures on the ventilator improved 

raising our impression that the patient had some kinking of her ET tube.  Her 

oxygen saturations were stable.


We will start nutritional support and wean vasopressor therapy.  Hopefully the 

steroids will assist in improving her blood pressure.  We will add Midodrine if 

Bp does not respond. 


More and more data are suggesting that the hypoxia from this disease is more 

related to uncoupling of poor firing rings and the viruses response to 

hemoglobin attachment of oxygen.  Continue antioxidant support.





Critical Time


Critical Time (minutes): 44


Level of Care: ICU


-: 


1.  The care of a critical patient is a dynamic process.  This note is a 

representative synopsis but static in nature.  The timeframe for treatments 

given in order is not necessarily the actual time these treatments may have been

done.





2.  This patient requires critical care secondary to ongoing requirements for 

therapy not offered or safe outside the critical care environment.  Transfer to 

a lower level of care will result in altered life or limb morbidity and 

mortality.





3.  Multidisciplinary rounds completed.





4.  ABCDE bundle addressed.

## 2020-04-14 LAB
ABSOLUTE LYMPHOCYTES# (MANUAL): 0.3 10^3/UL (ref 0.5–4.7)
ABSOLUTE MONOCYTES # (MANUAL): 0.4 10^3/UL (ref 0.1–1.4)
ADD MANUAL DIFF: YES
ANION GAP SERPL CALC-SCNC: 6 MMOL/L (ref 5–19)
ANISOCYTOSIS BLD QL SMEAR: SLIGHT
ARTERIAL BLOOD FIO2: (no result)
ARTERIAL BLOOD H2CO3: 1.28 MMOL/L (ref 1.05–1.35)
ARTERIAL BLOOD H2CO3: 1.36 MMOL/L (ref 1.05–1.35)
ARTERIAL BLOOD HCO3: 24.4 MMOL/L (ref 20–24)
ARTERIAL BLOOD HCO3: 26.5 MMOL/L (ref 20–24)
ARTERIAL BLOOD PCO2: 42.4 MMHG (ref 35–45)
ARTERIAL BLOOD PCO2: 45.1 MMHG (ref 35–45)
ARTERIAL BLOOD PH: 7.38 (ref 7.35–7.45)
ARTERIAL BLOOD PH: 7.39 (ref 7.35–7.45)
ARTERIAL BLOOD PO2: 55.1 MMHG (ref 80–100)
ARTERIAL BLOOD PO2: 58.3 MMHG (ref 80–100)
ARTERIAL BLOOD TOTAL CO2: 25.7 MMOL/L (ref 21–25)
ARTERIAL BLOOD TOTAL CO2: 27.9 MMOL/L (ref 21–25)
BASE EXCESS BLDA CALC-SCNC: -0.8 MMOL/L
BASE EXCESS BLDA CALC-SCNC: 1.2 MMOL/L
BASOPHILS NFR BLD MANUAL: 0 % (ref 0–2)
BUN SERPL-MCNC: 25 MG/DL (ref 7–20)
CALCIUM: 8 MG/DL (ref 8.4–10.2)
CHLORIDE SERPL-SCNC: 103 MMOL/L (ref 98–107)
CO2 SERPL-SCNC: 27 MMOL/L (ref 22–30)
CRP SERPL-MCNC: 179.7 MG/L (ref ?–10)
CRP SERPL-MCNC: 185.4 MG/L (ref ?–10)
EOSINOPHIL NFR BLD MANUAL: 0 % (ref 0–6)
ERYTHROCYTE [DISTWIDTH] IN BLOOD BY AUTOMATED COUNT: 14.7 % (ref 11.5–14)
FERRITIN SERPL-MCNC: 596 NG/ML (ref 11.1–264)
GLUCOSE SERPL-MCNC: 131 MG/DL (ref 75–110)
HCT VFR BLD CALC: 27.8 % (ref 36–47)
HGB BLD-MCNC: 9.5 G/DL (ref 12–15.5)
MCH RBC QN AUTO: 28.5 PG (ref 27–33.4)
MCHC RBC AUTO-ENTMCNC: 34.4 G/DL (ref 32–36)
MCV RBC AUTO: 83 FL (ref 80–97)
MONOCYTES % (MANUAL): 4 % (ref 3–13)
OVALOCYTES BLD QL SMEAR: SLIGHT
PHOSPHATE SERPL-MCNC: 2.6 MG/DL (ref 2.5–4.5)
PLATELET # BLD: 329 10^3/UL (ref 150–450)
PLATELET COMMENT: ADEQUATE
POTASSIUM SERPL-SCNC: 3.5 MMOL/L (ref 3.6–5)
RBC # BLD AUTO: 3.35 10^6/UL (ref 3.72–5.28)
SAO2 % BLDA: 88.2 % (ref 94–98)
SAO2 % BLDA: 89.7 % (ref 94–98)
SEGMENTED NEUTROPHILS % (MAN): 93 % (ref 42–78)
TOTAL CELLS COUNTED BLD: 100
VARIANT LYMPHS NFR BLD MANUAL: 3 % (ref 13–45)
WBC # BLD AUTO: 11.1 10^3/UL (ref 4–10.5)

## 2020-04-14 RX ADMIN — LINEZOLID SCH MLS/HR: 600 INJECTION, SOLUTION INTRAVENOUS at 05:23

## 2020-04-14 RX ADMIN — MINERAL OIL AND WHITE PETROLATUM SCH APPLIC: 150; 830 OINTMENT OPHTHALMIC at 05:23

## 2020-04-14 RX ADMIN — INSULIN GLARGINE SCH UNIT: 100 INJECTION, SOLUTION SUBCUTANEOUS at 15:41

## 2020-04-14 RX ADMIN — Medication SCH: at 21:09

## 2020-04-14 RX ADMIN — LINEZOLID SCH MLS/HR: 600 INJECTION, SOLUTION INTRAVENOUS at 18:08

## 2020-04-14 RX ADMIN — FAMOTIDINE SCH MG: 20 TABLET, FILM COATED ORAL at 10:32

## 2020-04-14 RX ADMIN — DEXMEDETOMIDINE HYDROCHLORIDE PRN MLS/HR: 4 INJECTION, SOLUTION INTRAVENOUS at 17:10

## 2020-04-14 RX ADMIN — FAMOTIDINE SCH MG: 20 TABLET, FILM COATED ORAL at 22:54

## 2020-04-14 RX ADMIN — ALBUTEROL SULFATE SCH: 90 AEROSOL, METERED RESPIRATORY (INHALATION) at 05:23

## 2020-04-14 RX ADMIN — ENOXAPARIN SODIUM SCH MG: 100 INJECTION SUBCUTANEOUS at 21:07

## 2020-04-14 RX ADMIN — Medication SCH: at 14:07

## 2020-04-14 RX ADMIN — DEXMEDETOMIDINE HYDROCHLORIDE PRN MLS/HR: 4 INJECTION, SOLUTION INTRAVENOUS at 13:51

## 2020-04-14 RX ADMIN — INSULIN HUMAN SCH UNIT: 100 INJECTION, SOLUTION PARENTERAL at 21:10

## 2020-04-14 RX ADMIN — ALBUTEROL SULFATE SCH: 90 AEROSOL, METERED RESPIRATORY (INHALATION) at 13:58

## 2020-04-14 RX ADMIN — OXYCODONE HYDROCHLORIDE AND ACETAMINOPHEN SCH MG: 500 TABLET ORAL at 09:22

## 2020-04-14 RX ADMIN — OXYCODONE HYDROCHLORIDE AND ACETAMINOPHEN SCH MG: 500 TABLET ORAL at 18:07

## 2020-04-14 RX ADMIN — Medication SCH: at 05:23

## 2020-04-14 RX ADMIN — FUROSEMIDE SCH MG: 10 INJECTION, SOLUTION INTRAMUSCULAR; INTRAVENOUS at 22:55

## 2020-04-14 RX ADMIN — MINERAL OIL AND WHITE PETROLATUM SCH APPLIC: 150; 830 OINTMENT OPHTHALMIC at 21:08

## 2020-04-14 RX ADMIN — DEXMEDETOMIDINE HYDROCHLORIDE PRN MLS/HR: 4 INJECTION, SOLUTION INTRAVENOUS at 08:25

## 2020-04-14 RX ADMIN — DEXMEDETOMIDINE HYDROCHLORIDE PRN MLS/HR: 4 INJECTION, SOLUTION INTRAVENOUS at 23:25

## 2020-04-14 RX ADMIN — VITAMIN D, TAB 1000IU (100/BT) SCH UNIT: 25 TAB at 09:22

## 2020-04-14 RX ADMIN — CEFEPIME SCH MLS/HR: 2 INJECTION, POWDER, FOR SOLUTION INTRAMUSCULAR; INTRAVENOUS at 22:55

## 2020-04-14 RX ADMIN — DEXMEDETOMIDINE HYDROCHLORIDE PRN MLS/HR: 4 INJECTION, SOLUTION INTRAVENOUS at 10:32

## 2020-04-14 RX ADMIN — HYDROCORTISONE SODIUM SUCCINATE SCH MG: 100 INJECTION, POWDER, FOR SOLUTION INTRAMUSCULAR; INTRAVENOUS at 21:07

## 2020-04-14 RX ADMIN — HYDROMORPHONE HYDROCHLORIDE PRN MLS/HR: 2 INJECTION INTRAMUSCULAR; INTRAVENOUS; SUBCUTANEOUS at 05:24

## 2020-04-14 RX ADMIN — OSELTAMIVIR PHOSPHATE SCH ML: 6 FOR SUSPENSION ORAL at 21:09

## 2020-04-14 RX ADMIN — Medication SCH MG: at 21:08

## 2020-04-14 RX ADMIN — DEXMEDETOMIDINE HYDROCHLORIDE PRN MLS/HR: 4 INJECTION, SOLUTION INTRAVENOUS at 05:23

## 2020-04-14 RX ADMIN — Medication SCH MG: at 09:22

## 2020-04-14 RX ADMIN — OSELTAMIVIR PHOSPHATE SCH MG: 6 FOR SUSPENSION ORAL at 09:23

## 2020-04-14 RX ADMIN — DEXMEDETOMIDINE HYDROCHLORIDE PRN MLS/HR: 4 INJECTION, SOLUTION INTRAVENOUS at 02:04

## 2020-04-14 RX ADMIN — ALBUTEROL SULFATE SCH: 90 AEROSOL, METERED RESPIRATORY (INHALATION) at 18:08

## 2020-04-14 RX ADMIN — FUROSEMIDE SCH MG: 10 INJECTION, SOLUTION INTRAMUSCULAR; INTRAVENOUS at 19:49

## 2020-04-14 RX ADMIN — CEFEPIME SCH MLS/HR: 2 INJECTION, POWDER, FOR SOLUTION INTRAMUSCULAR; INTRAVENOUS at 09:22

## 2020-04-14 RX ADMIN — HYDROCORTISONE SODIUM SUCCINATE SCH MG: 100 INJECTION, POWDER, FOR SOLUTION INTRAMUSCULAR; INTRAVENOUS at 05:23

## 2020-04-14 RX ADMIN — ENOXAPARIN SODIUM SCH MG: 100 INJECTION SUBCUTANEOUS at 09:23

## 2020-04-14 RX ADMIN — HYDROCORTISONE SODIUM SUCCINATE SCH MG: 100 INJECTION, POWDER, FOR SOLUTION INTRAMUSCULAR; INTRAVENOUS at 14:59

## 2020-04-14 RX ADMIN — DEXMEDETOMIDINE HYDROCHLORIDE PRN MLS/HR: 4 INJECTION, SOLUTION INTRAVENOUS at 20:23

## 2020-04-14 RX ADMIN — MINERAL OIL AND WHITE PETROLATUM SCH APPLIC: 150; 830 OINTMENT OPHTHALMIC at 14:59

## 2020-04-14 NOTE — [facility] CRITICAL CARE PROG REPORT
General


Date:: 04/14/20


ICU Day:: 5


Ventilator Day:: 5


Hospital Day:: 9


Resuscitation Status: Full Code


Medical Power of : DaughterMaryuri


Events in the past 12 to 24 Hours:: 


4.14.2020: Patient has become more awake and requiring sedation.  Her hypoxia 

has not worsened however she is still on 75% FiO2.


She is tolerating tube feeds.  She has not had hemodynamic instability.


There is not been any hypoxia with movement such as there was yesterday.





4.13.2020: Patient still has significant degree of hypoxia but is improved from 

admission.  She response to PEEP and higher tidal volumes without excess peak 

pressures.


No fever.  No hypotension.


Tolerating tube feeds








4.12.2020: Patient supinated yesterday without difficulty.


Patient's hypotension has now improved and she is off phase of vasopressor 

therapy.  She is sedated and the suspicion is that the propofol that was going 

through the central line in her neck may have extravasated.  There has been no 

untoward reaction of the skin. 








4.11.2020: Patient brought down from medical floor in respiratory extremis 

culminating in the need for intubation and mechanical ventilation


She was immediately placed in prone position and elevated levels of PEEP which 

resulted in improvement in her overall oxygen saturation.  Today's blood gas 

shows a PO2 which is lower than expected and I expect that this is a spurious 

result and have ordered a repeat ABG from the art line.  She developed 

hypotension presumably from the need for sedation and she was placed on 

vasopressor therapy.  Currently vasopressors at 30 on levophed with vasopressin 

ordered.  In review and examination under ultrasound I noted that the left 

internal jugular central venous catheter which had been stable had been pulled 

back and there appeared to be extravasation into the soft tissue.  She had 

awoken prior to this and was moving and this may have caused this occurring.  We

placed the vasopressor therapy into a large  antecubital vein while we prepared 

for central line.  With the thought process that the left internal jugular may 

be a concern subclavian was not considered because we were concerned that there 

would be thrombosis of the upper venous system.  Like to do place the catheter 

in the femoral vein however we had to reposition her and place her in supine 

position because of her instability.  While in supine position a basic critical 

care ultrasound of the heart showed an intact EF  (in fact hyperdynamic).  IVC 

was filled RV did not appear to be dysfunctional.


Given her hypotension we re-supinated her with close attention to her airway.  

She had had peak airway pressures which are elevated and I was suspicious that 

the ET tube was the cause.  With supination we had improvement in her peak 

airway pressures but not blood pressures.  Passive leg exam was done after the a

line was assured to be valid and her blood pressure improved significantly with.

 She had been ordered albumin earlier but I fearful that it extravasated into 

the soft tissue as well.  Repeat albumin was ordered.


Review of systems relevant to events:: 


4.14.2020: No significant elevation in peak or plateau pressures.  Mild to 

moderate secretions.  Inflammatory markers are improving





4.13.2020:


Evaluated ventilator pressure and waveforms.  No auto PEEP is noted.  Note that 

with the protective viral system attached to ET tube with the vent closed higher

pressures were noted.  This was opened.  Patient has a Hme filter on the 

expiratory limb





4.12.2020: No excessive fevers have been noted.  FiO2 now down to 70%.  This 

morning's ABG may be a venous draw.








4.11.2020: D-dimer continues to be elevated and patient is on Lovenox protocol. 

Other inflammatory parameters are not a significant however the neutrop

qgsq-rw-vdgdypsrgn ratio is elevated.


Steroids held with the reduction in ferritin.  Acquired replacement of axillary 

arterial line after became dysfunctional.  Left internal jugular central venous 

line enters the right subclavian vein but has been pulled back.  Chest x-ray has

not been repeated secondary to SARS, 2-CoViD19 restrictive concerns.


Reason for ICU Addmission:: Acute hypoxic respiratory failure with SARS, 2-

CoViD19





- Medications:


Medications reviewed and adjusted accordingly: Yes


Vasopressors:: 


Off


Sedation:: 





Precedex and dilaudid





Physical Exam


Vital Signs: 


                                        











Temp Pulse Resp BP Pulse Ox


 


 99.0 F   60   18   76/61 L  92 


 


 04/14/20 15:00  04/14/20 07:55  04/14/20 15:00  04/10/20 22:16  04/14/20 15:00








                                 Intake & Output











 04/13/20 04/14/20 04/15/20





 06:59 06:59 06:59


 


Intake Total 2924 1203 409


 


Output Total 1910 1330 400


 


Balance 1014 -127 9


 


Weight 95 kg 94.6 kg 








                                  Weight/Height





Weight                           94.6 kg


Height                           5 ft 2 in








General appearance: PRESENT: no acute distress, morbidly obese


Exam: 





Intubated nontoxic responsive 70-year-old black female morbidly obese no active 

disease


Head exam: PRESENT: atraumatic, normocephalic


Eye exam: PRESENT: conjunctiva pink, PERRLA.  ABSENT: conjunctival injection, 

nystagmus, scleral icterus


Mouth exam: PRESENT: moist


Neck exam: ABSENT: JVD, lymphadenopathy, thyromegaly, tracheal deviation


Respiratory exam: PRESENT: unlabored, other - Lung sounds not auscultated 

secondary to the confines of PPE and poor auditory capability of disposable 

stethoscope.  ABSENT: accessory muscle use, tachypnea


Cardiovascular exam: PRESENT: RRR, other - Heart sounds not auscultated 

secondary to the confines of PPE and poor auditory capability of disposable 

stethoscope.  ABSENT: irregular rhythm, tachycardia


Vascular exam: PRESENT: normal capillary refill


GI/Abdominal exam: PRESENT: soft, other - Gastric sounds not auscultated 

secondary to the confines of PPE and poor auditory capability of disposable 

stethoscope.  ABSENT: ascites, firm, guarding, organolmegaly, rigid, tenderness


Rectal exam: PRESENT: deferred


Gentrourinary exam: PRESENT: indwelling catheter


Extremities exam: PRESENT: pedal edema


Musculoskeletal exam: ABSENT: deformity, dislocation


Neurological exam: PRESENT: altered - Patient sedated appropriately.  No focal 

deficits moving all extremities to noxious stimulus.


Skin exam: PRESENT: dry, intact, warm.  ABSENT: cyanosis, mottled, pallor - His 

right femoral central lumen catheter which was required secondary to inability 

to place in the neck, rash





Laboratory/Radiographs


Laboratory Results: 


                                        





                                 04/14/20 03:07 





                                 04/14/20 03:07 





                                        











  04/13/20 04/13/20 04/14/20





  18:45 18:45 03:07


 


WBC   


 


RBC   


 


Hgb   


 


Hct   


 


MCV   


 


MCH   


 


MCHC   


 


RDW   


 


Plt Count   


 


Seg Neutrophils %   


 


Carbonic Acid    1.36 H


 


HCO3/H2CO3 Ratio    19:1


 


ABG pH    7.39


 


ABG pCO2    45.1 H


 


ABG pO2    55.1 L


 


ABG HCO3    26.5 H


 


ABG O2 Saturation    88.2 L


 


ABG Base Excess    1.2


 


FiO2    75%


 


Sodium  133.2 L  


 


Potassium  3.7  


 


Chloride  100  


 


Carbon Dioxide  25  


 


Anion Gap  9  


 


BUN  23 H  


 


Creatinine  1.06  


 


Est GFR ( Amer)  > 60  


 


Glucose  256 H  


 


Calcium  7.8 L  


 


Phosphorus   


 


Magnesium   


 


Ferritin   


 


C-Reactive Protein   


 


Triglycerides   361 H 














  04/14/20 04/14/20





  03:07 03:07


 


WBC   11.1 H


 


RBC   3.35 L


 


Hgb   9.5 L


 


Hct   27.8 L


 


MCV   83


 


MCH   28.5


 


MCHC   34.4


 


RDW   14.7 H


 


Plt Count   329


 


Seg Neutrophils %   Not Reportable


 


Carbonic Acid  


 


HCO3/H2CO3 Ratio  


 


ABG pH  


 


ABG pCO2  


 


ABG pO2  


 


ABG HCO3  


 


ABG O2 Saturation  


 


ABG Base Excess  


 


FiO2  


 


Sodium  136.4 L 


 


Potassium  3.5 L 


 


Chloride  103 


 


Carbon Dioxide  27 


 


Anion Gap  6 


 


BUN  25 H 


 


Creatinine  0.92 


 


Est GFR (African Amer)  > 60 


 


Glucose  131 H 


 


Calcium  8.0 L 


 


Phosphorus  2.6 


 


Magnesium  2.7 H 


 


Ferritin  691.00 H 


 


C-Reactive Protein  185.4 H 


 


Triglycerides  








                                        











  04/05/20 04/10/20 04/10/20





  14:00 11:48 11:48


 


Creatine Kinase   105 


 


CK-MB (CK-2)    0.87


 


Troponin I    < 0.012


 


NT-Pro-B Natriuret Pep  32  














  04/10/20 04/10/20 04/11/20





  22:45 22:45 04:45


 


Creatine Kinase  60   46


 


CK-MB (CK-2)   0.41 


 


Troponin I   0.018 


 


NT-Pro-B Natriuret Pep   














  04/11/20





  04:45


 


Creatine Kinase 


 


CK-MB (CK-2)  0.47


 


Troponin I  < 0.012


 


NT-Pro-B Natriuret Pep 











Impressions: 


                                        





Chest/Abdomen CTA  04/05/20 16:08


IMPRESSION:


1. Patchy peripheral and perihilar ground-glass opacities in both lungs.  These 

are commonly reported imaging features of COVID-19   pneumonia are present. 

Other processes such as influenza pneumonia and organizing pneumonia, as can be 

seen with drug toxicity and connective tissue disease, can cause a similar 

imaging pattern. PneTyp


2. Limited evaluation of the pulmonary arteries due to contrast bolus timing.  

No large central pulmonary embolus.  Evaluation of the segmental and 

subsegmental pulmonary arteries is limited.


 








Chest X-Ray  04/10/20 00:00


IMPRESSION:  Diffuse peripheral parenchymal opacities in both lungs.  Vascular 

congestion.


ET tube and nasogastric tube as expected.  Venous access catheter tip crosses 

midline nodes in the right subclavian.


 











All labs, radiographs, diagnostic studies and EKGs were personally reviewed: Yes


In addition, reports of radiographic and diagnostic studies were read: Yes





Assessment and Plan





- Diagnosis


(1) Shock, septic


Is this a current diagnosis for this admission?: Yes   


Plan: 


Resolved. Supportive care








(2) Acute hypoxemic respiratory failure


Is this a current diagnosis for this admission?: Yes   


Plan: 


Slightly improved still requiring high FiO2 and ventilator








(3) COVID-19 virus infection


Is this a current diagnosis for this admission?: Yes   


Plan: 





SARS, 2-CoViD19 positive








(4) Sepsis


Qualifiers: 


   Sepsis type: sepsis due to unspecified organism   Sepsis acute organ dysf

unction status: without acute organ dysfunction   Qualified Code(s): A41.9 - 

Sepsis, unspecified organism   


Is this a current diagnosis for this admission?: Yes   


Plan: 


Improved








(5) T2DM (type 2 diabetes mellitus)


Qualifiers: 


   Diabetes mellitus long term insulin use: without long term use   Diabetes 

mellitus complication status: without complication   Qualified Code(s): E11.9 - 

Type 2 diabetes mellitus without complications   


Is this a current diagnosis for this admission?: Yes   


Plan: 


Has high glucose elevations.  Insulin drip is now stopped and started on long-

acting insulin therapy.  Hyperglycemia worsened by steroids.








(6) Lactic acidosis


Is this a current diagnosis for this admission?: Yes   


Plan: 


Resolved.





Plan Summary: 


4.14.2020: Patient's hypoxia is still present.  I have elected to start her on 

diuretic therapy to see if we can get some improvement in her oxygen status.


Based on numerous reports this is not an uncommon situation with need for 

mechanical ventilation for up to 2 weeks.  In order to prevent harm we are 

limiting driving pressures and tidal volumes to keep plateau pressures less than

30.


Her inflammatory markers continue to improve.


D-dimer has improved but she still is in the category for the need for anti-

inflammatories.


Interleukin-6 was elevated not surprisingly.


We will continue to provide supportive care.


Plaquenil has discontinued after a total of 5 days.


Continue antibiotics for total of 7 days unless an infectious situation occurs.


Blood culture was a skin contaminant.


Continue nutritional support and supplemental medicated support.


Hopefully will be able to start weaning mechanical ventilation and oxygen 

support in the next 24 hours.


I suspect she may need steroids but will watch her FiO2 requirements with 

diuresis first.


If this does not improve the need for higher FiO2 will reinstitute steroids.  We

will also start if her inflammatory markers climb.








4.13.2020: Patient's hypoxia, although improved, is still significant.  Data 

from anecdotal information and from personal experience allows for this to be 

part of the norm for this pandemic.


She does not appear to be hemodynamically unstable at this time.  QTC is 

acceptable


Continue to monitor central line and arterial catheters for any source of infec

tion on steroids and Plaquenil


Continue supportive care


Add insulin drip


Continue steroids


No evidence to support myocardial dysfunction








4.12.2020: Patient has made some subtle improvement but is still on high FiO2.  

We will continue to provide support.


We will begin enteral tube feedings as well.


Inflammatory markers are improving significantly and will continue therapy.


D Dimer is remarkably reduced as well but will continue anti-thrombotic therapy 

to less than 1


Continue supportive care


Stop IV fluids


Watch QT interval


Vigilance for medication reactions








4.11.2020: Patient has septic shock is still very ill.  


I am impressed with some improvement in her overall parameters but her d-dimer 

still elevated necessitating the use for full treatment anticoagulation.


SARS, 2-CoViD19 standards of care have not been widely developed but drawing on 

preliminary data full dose treatment especially with d-dimer greater than 1 is 

acceptable and recommended.  She has not reached a inflammatory level which 

would require steroids however her shock like state dictates a small level of 

steroid.  Have ordered Solu-Cortef.  This is also been recommended by numerous 

recommendations.


We will continue supportive care as well as nutrition.


There are significant limitations in physical exam while patient is prone and we

will attempt to alleviate these.  All contact points including face were 

evaluated during examination to assure no pressure related ulceration.





Patient was re-supinated and reexamined.  Other than some mild facial swelling 

there were no untoward effects.  Her peak pressures on the ventilator improved 

raising our impression that the patient had some kinking of her ET tube.  Her 

oxygen saturations were stable.


We will start nutritional support and wean vasopressor therapy.  Hopefully the 

steroids will assist in improving her blood pressure.  We will add Midodrine if 

Bp does not respond. 


More and more data are suggesting that the hypoxia from this disease is more 

related to uncoupling of poor firing rings and the viruses response to 

hemoglobin attachment of oxygen.  Continue antioxidant support.





Critical Time


Critical Time (minutes): 40


Level of Care: ICU


-: 


1.  The care of a critical patient is a dynamic process.  This note is a 

representative synopsis but static in nature.  The timeframe for treatments 

given in order is not necessarily the actual time these treatments may have been

done.





2.  This patient requires critical care secondary to ongoing requirements for 

therapy not offered or safe outside the critical care environment.  Transfer to 

a lower level of care will result in altered life or limb morbidity and m

ortality.





3.  Multidisciplinary rounds completed.





4.  ABCDE bundle addressed.

## 2020-04-15 LAB
ABSOLUTE LYMPHOCYTES# (MANUAL): 0.2 10^3/UL (ref 0.5–4.7)
ABSOLUTE MONOCYTES # (MANUAL): 0.6 10^3/UL (ref 0.1–1.4)
ADD MANUAL DIFF: YES
ANION GAP SERPL CALC-SCNC: 8 MMOL/L (ref 5–19)
ANISOCYTOSIS BLD QL SMEAR: SLIGHT
ARTERIAL BLOOD FIO2: (no result)
ARTERIAL BLOOD H2CO3: 1.4 MMOL/L (ref 1.05–1.35)
ARTERIAL BLOOD HCO3: 27.7 MMOL/L (ref 20–24)
ARTERIAL BLOOD PCO2: 46.6 MMHG (ref 35–45)
ARTERIAL BLOOD PH: 7.39 (ref 7.35–7.45)
ARTERIAL BLOOD PO2: 49.2 MMHG (ref 80–100)
ARTERIAL BLOOD TOTAL CO2: 29.1 MMOL/L (ref 21–25)
BASE EXCESS BLDA CALC-SCNC: 2.3 MMOL/L
BASOPHILS NFR BLD MANUAL: 0 % (ref 0–2)
BUN SERPL-MCNC: 29 MG/DL (ref 7–20)
CALCIUM: 7.6 MG/DL (ref 8.4–10.2)
CHLORIDE SERPL-SCNC: 102 MMOL/L (ref 98–107)
CO2 SERPL-SCNC: 29 MMOL/L (ref 22–30)
DACRYOCYTES BLD QL SMEAR: SLIGHT
EOSINOPHIL NFR BLD MANUAL: 0 % (ref 0–6)
ERYTHROCYTE [DISTWIDTH] IN BLOOD BY AUTOMATED COUNT: 14.8 % (ref 11.5–14)
GLUCOSE SERPL-MCNC: 167 MG/DL (ref 75–110)
HCT VFR BLD CALC: 27.6 % (ref 36–47)
HGB BLD-MCNC: 9.5 G/DL (ref 12–15.5)
MCH RBC QN AUTO: 29 PG (ref 27–33.4)
MCHC RBC AUTO-ENTMCNC: 34.6 G/DL (ref 32–36)
MCV RBC AUTO: 84 FL (ref 80–97)
MONOCYTES % (MANUAL): 6 % (ref 3–13)
NEUTS BAND NFR BLD MANUAL: 1 % (ref 3–5)
OVALOCYTES BLD QL SMEAR: SLIGHT
PHOSPHATE SERPL-MCNC: 3.4 MG/DL (ref 2.5–4.5)
PLATELET # BLD: 313 10^3/UL (ref 150–450)
PLATELET COMMENT: ADEQUATE
POTASSIUM SERPL-SCNC: 3.2 MMOL/L (ref 3.6–5)
RBC # BLD AUTO: 3.29 10^6/UL (ref 3.72–5.28)
SAO2 % BLDA: 84.2 % (ref 94–98)
SEGMENTED NEUTROPHILS % (MAN): 91 % (ref 42–78)
TOTAL CELLS COUNTED BLD: 100
VARIANT LYMPHS NFR BLD MANUAL: 2 % (ref 13–45)
WBC # BLD AUTO: 10.3 10^3/UL (ref 4–10.5)

## 2020-04-15 RX ADMIN — DEXMEDETOMIDINE HYDROCHLORIDE PRN MLS/HR: 4 INJECTION, SOLUTION INTRAVENOUS at 02:30

## 2020-04-15 RX ADMIN — Medication SCH: at 22:54

## 2020-04-15 RX ADMIN — PROPOFOL PRN MLS/HR: 10 INJECTION, EMULSION INTRAVENOUS at 16:30

## 2020-04-15 RX ADMIN — DIBASIC SODIUM PHOSPHATE, MONOBASIC POTASSIUM PHOSPHATE AND MONOBASIC SODIUM PHOSPHATE SCH MG: 852; 155; 130 TABLET ORAL at 06:02

## 2020-04-15 RX ADMIN — INSULIN HUMAN SCH: 100 INJECTION, SOLUTION PARENTERAL at 01:32

## 2020-04-15 RX ADMIN — DEXMEDETOMIDINE HYDROCHLORIDE PRN MLS/HR: 4 INJECTION, SOLUTION INTRAVENOUS at 07:45

## 2020-04-15 RX ADMIN — Medication SCH MG: at 22:54

## 2020-04-15 RX ADMIN — CEFEPIME SCH MLS/HR: 2 INJECTION, POWDER, FOR SOLUTION INTRAMUSCULAR; INTRAVENOUS at 22:54

## 2020-04-15 RX ADMIN — DIBASIC SODIUM PHOSPHATE, MONOBASIC POTASSIUM PHOSPHATE AND MONOBASIC SODIUM PHOSPHATE SCH MG: 852; 155; 130 TABLET ORAL at 01:31

## 2020-04-15 RX ADMIN — DIBASIC SODIUM PHOSPHATE, MONOBASIC POTASSIUM PHOSPHATE AND MONOBASIC SODIUM PHOSPHATE SCH MG: 852; 155; 130 TABLET ORAL at 13:19

## 2020-04-15 RX ADMIN — OXYCODONE HYDROCHLORIDE AND ACETAMINOPHEN SCH MG: 500 TABLET ORAL at 09:13

## 2020-04-15 RX ADMIN — CEFEPIME SCH MLS/HR: 2 INJECTION, POWDER, FOR SOLUTION INTRAMUSCULAR; INTRAVENOUS at 09:13

## 2020-04-15 RX ADMIN — MINERAL OIL AND WHITE PETROLATUM SCH APPLIC: 150; 830 OINTMENT OPHTHALMIC at 05:44

## 2020-04-15 RX ADMIN — HYDROMORPHONE HYDROCHLORIDE PRN MLS/HR: 2 INJECTION INTRAMUSCULAR; INTRAVENOUS; SUBCUTANEOUS at 14:44

## 2020-04-15 RX ADMIN — LINEZOLID SCH MLS/HR: 600 INJECTION, SOLUTION INTRAVENOUS at 05:46

## 2020-04-15 RX ADMIN — FUROSEMIDE SCH MG: 10 INJECTION, SOLUTION INTRAMUSCULAR; INTRAVENOUS at 05:44

## 2020-04-15 RX ADMIN — MINERAL OIL AND WHITE PETROLATUM SCH APPLIC: 150; 830 OINTMENT OPHTHALMIC at 22:52

## 2020-04-15 RX ADMIN — PROPOFOL PRN MLS/HR: 10 INJECTION, EMULSION INTRAVENOUS at 23:50

## 2020-04-15 RX ADMIN — INSULIN HUMAN SCH: 100 INJECTION, SOLUTION PARENTERAL at 17:50

## 2020-04-15 RX ADMIN — OSELTAMIVIR PHOSPHATE SCH ML: 6 FOR SUSPENSION ORAL at 09:18

## 2020-04-15 RX ADMIN — Medication SCH MG: at 09:16

## 2020-04-15 RX ADMIN — ENOXAPARIN SODIUM SCH MG: 100 INJECTION SUBCUTANEOUS at 09:13

## 2020-04-15 RX ADMIN — INSULIN HUMAN SCH: 100 INJECTION, SOLUTION PARENTERAL at 05:44

## 2020-04-15 RX ADMIN — LINEZOLID SCH MLS/HR: 600 INJECTION, SOLUTION INTRAVENOUS at 17:49

## 2020-04-15 RX ADMIN — FAMOTIDINE SCH MG: 20 TABLET, FILM COATED ORAL at 09:14

## 2020-04-15 RX ADMIN — INSULIN HUMAN SCH UNIT: 100 INJECTION, SOLUTION PARENTERAL at 13:20

## 2020-04-15 RX ADMIN — HYDROCORTISONE SODIUM SUCCINATE SCH MG: 100 INJECTION, POWDER, FOR SOLUTION INTRAMUSCULAR; INTRAVENOUS at 05:45

## 2020-04-15 RX ADMIN — DIBASIC SODIUM PHOSPHATE, MONOBASIC POTASSIUM PHOSPHATE AND MONOBASIC SODIUM PHOSPHATE SCH MG: 852; 155; 130 TABLET ORAL at 20:10

## 2020-04-15 RX ADMIN — OXYCODONE HYDROCHLORIDE AND ACETAMINOPHEN SCH MG: 500 TABLET ORAL at 17:47

## 2020-04-15 RX ADMIN — VITAMIN D, TAB 1000IU (100/BT) SCH UNIT: 25 TAB at 09:13

## 2020-04-15 RX ADMIN — POTASSIUM CHLORIDE SCH MEQ: 1.5 FOR SOLUTION ORAL at 13:19

## 2020-04-15 RX ADMIN — Medication SCH: at 05:45

## 2020-04-15 RX ADMIN — PROPOFOL PRN MLS/HR: 10 INJECTION, EMULSION INTRAVENOUS at 10:50

## 2020-04-15 RX ADMIN — HYDROCORTISONE SODIUM SUCCINATE SCH MG: 100 INJECTION, POWDER, FOR SOLUTION INTRAMUSCULAR; INTRAVENOUS at 22:54

## 2020-04-15 RX ADMIN — HYDROCORTISONE SODIUM SUCCINATE SCH MG: 100 INJECTION, POWDER, FOR SOLUTION INTRAMUSCULAR; INTRAVENOUS at 13:19

## 2020-04-15 RX ADMIN — INSULIN GLARGINE SCH UNIT: 100 INJECTION, SOLUTION SUBCUTANEOUS at 09:17

## 2020-04-15 RX ADMIN — DEXMEDETOMIDINE HYDROCHLORIDE PRN MLS/HR: 4 INJECTION, SOLUTION INTRAVENOUS at 05:46

## 2020-04-15 RX ADMIN — MINERAL OIL AND WHITE PETROLATUM SCH APPLIC: 150; 830 OINTMENT OPHTHALMIC at 13:27

## 2020-04-15 RX ADMIN — POTASSIUM CHLORIDE SCH MEQ: 1.5 FOR SOLUTION ORAL at 06:39

## 2020-04-15 RX ADMIN — Medication SCH: at 14:26

## 2020-04-15 RX ADMIN — OSELTAMIVIR PHOSPHATE SCH ML: 6 FOR SUSPENSION ORAL at 09:27

## 2020-04-15 RX ADMIN — ENOXAPARIN SODIUM SCH MG: 100 INJECTION SUBCUTANEOUS at 22:51

## 2020-04-15 RX ADMIN — HYDROMORPHONE HYDROCHLORIDE PRN MLS/HR: 2 INJECTION INTRAMUSCULAR; INTRAVENOUS; SUBCUTANEOUS at 00:10

## 2020-04-15 RX ADMIN — FUROSEMIDE SCH MG: 10 INJECTION, SOLUTION INTRAMUSCULAR; INTRAVENOUS at 13:19

## 2020-04-15 RX ADMIN — INSULIN HUMAN SCH UNIT: 100 INJECTION, SOLUTION PARENTERAL at 22:51

## 2020-04-15 RX ADMIN — INSULIN HUMAN SCH UNIT: 100 INJECTION, SOLUTION PARENTERAL at 09:17

## 2020-04-15 RX ADMIN — FAMOTIDINE SCH MG: 20 TABLET, FILM COATED ORAL at 22:54

## 2020-04-15 NOTE — PDOC CRITICAL CARE PROG REPORT
General


Date:: 04/15/20


ICU Day:: 6


Ventilator Day:: 6


Hospital Day:: 10


Resuscitation Status: Full Code


Medical Power of : DaughterMaryuri


Events in the past 12 to 24 Hours:: 


4.15.2020: Hypoxia overnight. Slightly higher fluid accumulation with HEPA 

filter on patient ETT. Improved on pressure control. No auto-peep. Responded to 

lower PEEP. 





4.14.2020: Patient has become more awake and requiring sedation.  Her hypoxia 

has not worsened however she is still on 75% FiO2.


She is tolerating tube feeds.  She has not had hemodynamic instability.


There is not been any hypoxia with movement such as there was yesterday.





4.13.2020: Patient still has significant degree of hypoxia but is improved from 

admission.  She response to PEEP and higher tidal volumes without excess peak 

pressures.


No fever.  No hypotension.


Tolerating tube feeds








4.12.2020: Patient supinated yesterday without difficulty.


Patient's hypotension has now improved and she is off phase of vasopressor 

therapy.  She is sedated and the suspicion is that the propofol that was going 

through the central line in her neck may have extravasated.  There has been no 

untoward reaction of the skin. 








4.11.2020: Patient brought down from medical floor in respiratory extremis 

culminating in the need for intubation and mechanical ventilation


She was immediately placed in prone position and elevated levels of PEEP which 

resulted in improvement in her overall oxygen saturation.  Today's blood gas 

shows a PO2 which is lower than expected and I expect that this is a spurious 

result and have ordered a repeat ABG from the art line.  She developed 

hypotension presumably from the need for sedation and she was placed on 

vasopressor therapy.  Currently vasopressors at 30 on levophed with vasopressin 

ordered.  In review and examination under ultrasound I noted that the left 

internal jugular central venous catheter which had been stable had been pulled 

back and there appeared to be extravasation into the soft tissue.  She had 

awoken prior to this and was moving and this may have caused this occurring.  We

placed the vasopressor therapy into a large  antecubital vein while we prepared 

for central line.  With the thought process that the left internal jugular may 

be a concern subclavian was not considered because we were concerned that there 

would be thrombosis of the upper venous system.  Like to do place the catheter 

in the femoral vein however we had to reposition her and place her in supine 

position because of her instability.  While in supine position a basic critical 

care ultrasound of the heart showed an intact EF  (in fact hyperdynamic).  IVC 

was filled RV did not appear to be dysfunctional.


Given her hypotension we re-supinated her with close attention to her airway.  

She had had peak airway pressures which are elevated and I was suspicious that 

the ET tube was the cause.  With supination we had improvement in her peak 

airway pressures but not blood pressures.  Passive leg exam was done after the a

line was assured to be valid and her blood pressure improved significantly with.

 She had been ordered albumin earlier but I fearful that it extravasated into 

the soft tissue as well.  Repeat albumin was ordered.


Review of systems relevant to events:: 


4.15.2020: Ventilator pressures are high with high peak pressures.  In evaluati

on it appears that the HEPA filter was contributing to this.  Remove this and 

place patient on conventional assist control with remarkable improvement in 

pressures and in her oxygenation.  On high dose Precedex. Changed to propofol. 

Patient now on PSV/CPAP with significant improvement. Diuresed well.  Has 

significant diarrhea and C. difficile studies sent


Personally removed HEPA filter from patient side secondary to obstruction.  This

improved her ventilator mechanics.





4.14.2020: No significant elevation in peak or plateau pressures.  Mild to 

moderate secretions.  Inflammatory markers are improving





4.13.2020:


Evaluated ventilator pressure and waveforms.  No auto PEEP is noted.  Note that 

with the protective viral system attached to ET tube with the vent closed higher

pressures were noted.  This was opened.  Patient has a Hme filter on the 

expiratory limb





4.12.2020: No excessive fevers have been noted.  FiO2 now down to 70%.  This 

morning's ABG may be a venous draw.








4.11.2020: D-dimer continues to be elevated and patient is on Lovenox protocol. 

Other inflammatory parameters are not a significant however the 

bhllhdevbhq-px-dzpxntyiza ratio is elevated.


Steroids held with the reduction in ferritin.  Acquired replacement of axillary 

arterial line after became dysfunctional.  Left internal jugular central venous 

line enters the right subclavian vein but has been pulled back.  Chest x-ray has

not been repeated secondary to SARS, 2-CoViD19 restrictive concerns.


Reason for ICU Addmission:: Acute hypoxic respiratory failure with SARS, 2-

CoViD19





- Medications:


Vasopressors:: 


Off


Sedation:: 





Precedex and dilaudid





Physical Exam


Vital Signs: 


                                        











Temp Pulse Resp BP Pulse Ox


 


 100.0 F   87   11 L  76/61 L  91 L


 


 04/15/20 06:00  04/14/20 22:00  04/15/20 06:00  04/10/20 22:16  04/15/20 06:00








                                 Intake & Output











 04/14/20 04/15/20 04/16/20





 06:59 06:59 06:59


 


Intake Total 1203 1205 


 


Output Total 1330 2715 


 


Balance -127 -1510 


 


Weight 94.6 kg 96.9 kg 








                                  Weight/Height





Weight                           96.9 kg


Height                           5 ft 2 in








General appearance: PRESENT: no acute distress, morbidly obese


Exam: 





Debated older appearing 70-year-old black female no acute distress.


Eye exam: PRESENT: PERRLA.  ABSENT: conjunctival injection, nystagmus, scleral 

icterus


Mouth exam: PRESENT: moist


Neck exam: ABSENT: JVD, lymphadenopathy, tenderness, thyromegaly


Respiratory exam: PRESENT: unlabored.  ABSENT: accessory muscle use, tachypnea, 

other - Lung sounds not auscultated secondary to the confines of PPE and poor 

auditory capability of disposable stethoscope


Cardiovascular exam: PRESENT: other - Heart sounds not auscultated secondary to 

the confines of PPE and poor auditory capability of disposable stethoscope


Pulses: ABSENT: normal dorsalis pedis pul


GI/Abdominal exam: PRESENT: soft, other - Gastric sounds not auscultated 

secondary to the confines of PPE and poor auditory capability of disposable 

stethoscope.  ABSENT: ascites, firm, guarding, mass, Rizzo's sign, 

organolmegaly, rebound, rigid, tenderness


Rectal exam: PRESENT: deferred, other - Significant diarrhea..  Bowel hygiene 

tube placed by nursing.  Evaluated patient's sacrum no decubiti.


Gentrourinary exam: PRESENT: indwelling catheter, other - She had significant on

a stool which was on externalized portion of Ervin.


Musculoskeletal exam: PRESENT: other - Right femoral catheter is clean dry 

intact no erythema.  There is no swelling or hematoma.  ABSENT: deformity, 

dislocation


Neurological exam: PRESENT: altered - With sedation wean patient is moving all 

extremities without focal deficits.


Psychiatric exam: PRESENT: appropriate affect


Skin exam: PRESENT: dry, intact, warm.  ABSENT: cyanosis, rash


Tubes/Lines: PRESENT: Endotracheal Tube, Central Line, Arterial Catheter - 

Orogastric tube, Ervin





Laboratory/Radiographs


Laboratory Results: 


                                        





                                 04/15/20 03:55 





                                 04/15/20 03:55 





                                        











  04/14/20 04/14/20 04/14/20





  19:54 22:48 22:48


 


WBC   


 


RBC   


 


Hgb   


 


Hct   


 


MCV   


 


MCH   


 


MCHC   


 


RDW   


 


Plt Count   


 


Seg Neutrophils %   


 


Carbonic Acid    1.28


 


HCO3/H2CO3 Ratio    19:1


 


ABG pH    7.38


 


ABG pCO2    42.4


 


ABG pO2    58.3 L


 


ABG HCO3    24.4 H


 


ABG O2 Saturation    89.7 L


 


ABG Base Excess    -0.8


 


FiO2    85%


 


Sodium   


 


Potassium   


 


Chloride   


 


Carbon Dioxide   


 


Anion Gap   


 


BUN   


 


Creatinine   


 


Est GFR (African Amer)   


 


Glucose   


 


Lactic Acid   3.7 H 


 


Calcium   


 


Phosphorus   


 


Magnesium   


 


Ferritin  596.00 H  


 


C-Reactive Protein  179.7 H  














  04/15/20 04/15/20 04/15/20





  03:55 03:55 03:55


 


WBC    10.3


 


RBC    3.29 L


 


Hgb    9.5 L


 


Hct    27.6 L


 


MCV    84


 


MCH    29.0


 


MCHC    34.6


 


RDW    14.8 H


 


Plt Count    313


 


Seg Neutrophils %    Not Reportable


 


Carbonic Acid  1.40 H  


 


HCO3/H2CO3 Ratio  19:1  


 


ABG pH  7.39  


 


ABG pCO2  46.6 H  


 


ABG pO2  49.2 L  


 


ABG HCO3  27.7 H  


 


ABG O2 Saturation  84.2 L  


 


ABG Base Excess  2.3  


 


FiO2  70%  


 


Sodium   138.7 


 


Potassium   3.2 L 


 


Chloride   102 


 


Carbon Dioxide   29 


 


Anion Gap   8 


 


BUN   29 H 


 


Creatinine   1.05 


 


Est GFR ( Amer)   > 60 


 


Glucose   167 H 


 


Lactic Acid   


 


Calcium   7.6 L 


 


Phosphorus   3.4 


 


Magnesium   2.5 H 


 


Ferritin   


 


C-Reactive Protein   








                                        











  04/05/20 04/10/20 04/10/20





  14:00 11:48 11:48


 


Creatine Kinase   105 


 


CK-MB (CK-2)    0.87


 


Troponin I    < 0.012


 


NT-Pro-B Natriuret Pep  32  














  04/10/20 04/10/20 04/11/20





  22:45 22:45 04:45


 


Creatine Kinase  60   46


 


CK-MB (CK-2)   0.41 


 


Troponin I   0.018 


 


NT-Pro-B Natriuret Pep   














  04/11/20





  04:45


 


Creatine Kinase 


 


CK-MB (CK-2)  0.47


 


Troponin I  < 0.012


 


NT-Pro-B Natriuret Pep 











Impressions: 


                                        





Chest/Abdomen CTA  04/05/20 16:08


IMPRESSION:


1. Patchy peripheral and perihilar ground-glass opacities in both lungs.  These 

are commonly reported imaging features of COVID-19   pneumonia are present. 

Other processes such as influenza pneumonia and organizing pneumonia, as can be 

seen with drug toxicity and connective tissue disease, can cause a similar 

imaging pattern. PneTyp


2. Limited evaluation of the pulmonary arteries due to contrast bolus timing.  

No large central pulmonary embolus.  Evaluation of the segmental and 

subsegmental pulmonary arteries is limited.


 








Chest X-Ray  04/10/20 00:00


IMPRESSION:  Diffuse peripheral parenchymal opacities in both lungs.  Vascular 

congestion.


ET tube and nasogastric tube as expected.  Venous access catheter tip crosses 

midline nodes in the right subclavian.


 











All labs, radiographs, diagnostic studies and EKGs were personally reviewed: Yes


In addition, reports of radiographic and diagnostic studies were read: Yes





Assessment and Plan





- Diagnosis


(1) Shock, septic


Is this a current diagnosis for this admission?: Yes   





(2) Acute hypoxemic respiratory failure


Is this a current diagnosis for this admission?: Yes   





(3) COVID-19 virus infection


Is this a current diagnosis for this admission?: Yes   





(4) Sepsis


Qualifiers: 


   Sepsis type: sepsis due to unspecified organism   Sepsis acute organ 

dysfunction status: without acute organ dysfunction   Qualified Code(s): A41.9 -

Sepsis, unspecified organism   


Is this a current diagnosis for this admission?: Yes   





(5) T2DM (type 2 diabetes mellitus)


Qualifiers: 


   Diabetes mellitus long term insulin use: without long term use   Diabetes 

mellitus complication status: without complication   Qualified Code(s): E11.9 - 

Type 2 diabetes mellitus without complications   


Is this a current diagnosis for this admission?: Yes   





(6) Lactic acidosis


Is this a current diagnosis for this admission?: Yes   


Plan: 


Resolved.





Plan Summary: 


4.15.2020: Patient's respiratory status is still tenuous as far as hypoxia.  She

is following a typical pattern for SARS, 2-CoViD19 pneumonitis.  What we did 

find is that the HEPA filter was causing significant obstruction to airway and 

with removal of this we had improvement.  We have been adding an extra HEPA 

filter at the end of the ET tube to help with any inadvertent tube 

disconnections.  This is due to the lack of negative airflow room at this time. 

We exchanged to have a filter by clamping the ET tube momentarily and 

disconnecting the inspiratory limb of the ventilator.


She has significant diarrhea and a bowel management tube is been placed.  C. 

difficile testing has been sent.


He has had significant challenges with agitation and we have changed and 

augmented her sedation profile.


Her glucose has been elevated and we started her on Lantus.


Respiratory PCR is negative for flu Tamiflu has been discontinued


Her inflammatory markers have improved.  Her FiO2 has been weaned to 70% today.


Enteral tube feeds have been started.


Continue supportive care








4.14.2020: Patient's hypoxia is still present.  I have elected to start her on 

diuretic therapy to see if we can get some improvement in her oxygen status.


Based on numerous reports this is not an uncommon situation with need for 

mechanical ventilation for up to 2 weeks.  In order to prevent harm we are 

limiting driving pressures and tidal volumes to keep plateau pressures less than

30.


Her inflammatory markers continue to improve.


D-dimer has improved but she still is in the category for the need for 

anti-inflammatories.


Interleukin-6 was elevated not surprisingly.


We will continue to provide supportive care.


Plaquenil has discontinued after a total of 5 days.


Continue antibiotics for total of 7 days unless an infectious situation occurs.


Blood culture was a skin contaminant.


Continue nutritional support and supplemental medicated support.


Hopefully will be able to start weaning mechanical ventilation and oxygen 

support in the next 24 hours.


I suspect she may need steroids but will watch her FiO2 requirements with 

diuresis first.








4.13.2020: Patient's hypoxia, although improved, is still significant.  Data 

from anecdotal information and from personal experience allows for this to be 

part of the norm for this pandemic.


She does not appear to be hemodynamically unstable at this time.  QTC is 

acceptable


Continue to monitor central line and arterial catheters for any source of 

infection on steroids and Plaquenil


Continue supportive care


Add insulin drip


Continue steroids


No evidence to support myocardial dysfunction








4.12.2020: Patient has made some subtle improvement but is still on high FiO2.  

We will continue to provide support.


We will begin enteral tube feedings as well.


Inflammatory markers are improving significantly and will continue therapy.


D Dimer is remarkably reduced as well but will continue anti-thrombotic therapy 

to less than 1


Continue supportive care


Stop IV fluids


Watch QT interval


Vigilance for medication reactions








4.11.2020: Patient has septic shock is still very ill.  


I am impressed with some improvement in her overall parameters but her d-dimer 

still elevated necessitating the use for full treatment anticoagulation.


SARS, 2-CoViD19 standards of care have not been widely developed but drawing on 

preliminary data full dose treatment especially with d-dimer greater than 1 is 

acceptable and recommended.  She has not reached a inflammatory level which 

would require steroids however her shock like state dictates a small level of 

steroid.  Have ordered Solu-Cortef.  This is also been recommended by numerous 

recommendations.


We will continue supportive care as well as nutrition.


There are significant limitations in physical exam while patient is prone and we

will attempt to alleviate these.  All contact points including face were 

evaluated during examination to assure no pressure related ulceration.





Patient was re-supinated and reexamined.  Other than some mild facial swelling 

there were no untoward effects.  Her peak pressures on the ventilator improved 

raising our impression that the patient had some kinking of her ET tube.  Her 

oxygen saturations were stable.


We will start nutritional support and wean vasopressor therapy.  Hopefully the 

steroids will assist in improving her blood pressure.  We will add Midodrine if 

Bp does not respond. 


More and more data are suggesting that the hypoxia from this disease is more 

related to uncoupling of poor firing rings and the viruses response to 

hemoglobin attachment of oxygen.  Continue antioxidant support.





Critical Time


Critical Time (minutes): 60


Level of Care: ICU


-: 


1.  The care of a critical patient is a dynamic process.  This note is a 

representative synopsis but static in nature.  The timeframe for treatments 

given in order is not necessarily the actual time these treatments may have been

done.





2.  This patient requires critical care secondary to ongoing requirements for 

therapy not offered or safe outside the critical care environment.  Transfer to 

a lower level of care will result in altered life or limb morbidity and 

mortality.





3.  Multidisciplinary rounds completed.





4.  ABCDE bundle addressed.

## 2020-04-16 LAB
ABSOLUTE LYMPHOCYTES# (MANUAL): 0.3 10^3/UL (ref 0.5–4.7)
ABSOLUTE MONOCYTES # (MANUAL): 0.3 10^3/UL (ref 0.1–1.4)
ADD MANUAL DIFF: YES
ANION GAP SERPL CALC-SCNC: 11 MMOL/L (ref 5–19)
ANISOCYTOSIS BLD QL SMEAR: SLIGHT
ARTERIAL BLOOD H2CO3: 1.38 MMOL/L (ref 1.05–1.35)
ARTERIAL BLOOD HCO3: 26.6 MMOL/L (ref 20–24)
ARTERIAL BLOOD PCO2: 45.7 MMHG (ref 35–45)
ARTERIAL BLOOD PH: 7.38 (ref 7.35–7.45)
ARTERIAL BLOOD PO2: 45 MMHG (ref 80–100)
ARTERIAL BLOOD TOTAL CO2: 28 MMOL/L (ref 21–25)
BASE EXCESS BLDA CALC-SCNC: 1.2 MMOL/L
BASOPHILS NFR BLD MANUAL: 0 % (ref 0–2)
BUN SERPL-MCNC: 34 MG/DL (ref 7–20)
C DIFFICILE GDH: NEGATIVE
CALCIUM: 7.5 MG/DL (ref 8.4–10.2)
CHLORIDE SERPL-SCNC: 103 MMOL/L (ref 98–107)
CO2 SERPL-SCNC: 27 MMOL/L (ref 22–30)
CRP SERPL-MCNC: 183.8 MG/L (ref ?–10)
EOSINOPHIL NFR BLD MANUAL: 0 % (ref 0–6)
ERYTHROCYTE [DISTWIDTH] IN BLOOD BY AUTOMATED COUNT: 15.1 % (ref 11.5–14)
FERRITIN SERPL-MCNC: 686 NG/ML (ref 11.1–264)
GLUCOSE SERPL-MCNC: 313 MG/DL (ref 75–110)
HCT VFR BLD CALC: 27.3 % (ref 36–47)
HGB BLD-MCNC: 9.1 G/DL (ref 12–15.5)
MCH RBC QN AUTO: 28.3 PG (ref 27–33.4)
MCHC RBC AUTO-ENTMCNC: 33.4 G/DL (ref 32–36)
MCV RBC AUTO: 85 FL (ref 80–97)
MONOCYTES % (MANUAL): 2 % (ref 3–13)
PHOSPHATE SERPL-MCNC: 3.9 MG/DL (ref 2.5–4.5)
PLATELET # BLD: 303 10^3/UL (ref 150–450)
PLATELET CLUMP BLD QL SMEAR: PRESENT
PLATELET COMMENT: ADEQUATE
POTASSIUM SERPL-SCNC: 2.7 MMOL/L (ref 3.6–5)
RBC # BLD AUTO: 3.23 10^6/UL (ref 3.72–5.28)
SAO2 % BLDA: 80 % (ref 94–98)
SEGMENTED NEUTROPHILS % (MAN): 96 % (ref 42–78)
TOTAL CELLS COUNTED BLD: 100
TRIGL SERPL-MCNC: 327 MG/DL (ref ?–150)
VARIANT LYMPHS NFR BLD MANUAL: 2 % (ref 13–45)
WBC # BLD AUTO: 15.8 10^3/UL (ref 4–10.5)

## 2020-04-16 RX ADMIN — LINEZOLID SCH MLS/HR: 600 INJECTION, SOLUTION INTRAVENOUS at 06:30

## 2020-04-16 RX ADMIN — POTASSIUM CHLORIDE SCH MLS/HR: 29.8 INJECTION, SOLUTION INTRAVENOUS at 15:52

## 2020-04-16 RX ADMIN — MINERAL OIL AND WHITE PETROLATUM SCH APPLIC: 150; 830 OINTMENT OPHTHALMIC at 06:29

## 2020-04-16 RX ADMIN — VITAMIN D, TAB 1000IU (100/BT) SCH UNIT: 25 TAB at 10:21

## 2020-04-16 RX ADMIN — Medication SCH: at 05:08

## 2020-04-16 RX ADMIN — MIDAZOLAM HYDROCHLORIDE PRN MG: 1 INJECTION, SOLUTION INTRAMUSCULAR; INTRAVENOUS at 02:08

## 2020-04-16 RX ADMIN — HYDROMORPHONE HYDROCHLORIDE PRN MG: 2 INJECTION INTRAMUSCULAR; INTRAVENOUS; SUBCUTANEOUS at 23:33

## 2020-04-16 RX ADMIN — OXYCODONE HYDROCHLORIDE AND ACETAMINOPHEN SCH MG: 500 TABLET ORAL at 18:29

## 2020-04-16 RX ADMIN — OXYCODONE HYDROCHLORIDE AND ACETAMINOPHEN SCH MG: 500 TABLET ORAL at 10:21

## 2020-04-16 RX ADMIN — LINEZOLID SCH MLS/HR: 600 INJECTION, SOLUTION INTRAVENOUS at 18:29

## 2020-04-16 RX ADMIN — ENOXAPARIN SODIUM SCH MG: 100 INJECTION SUBCUTANEOUS at 10:20

## 2020-04-16 RX ADMIN — HYDROMORPHONE HYDROCHLORIDE PRN MG: 2 INJECTION INTRAMUSCULAR; INTRAVENOUS; SUBCUTANEOUS at 12:34

## 2020-04-16 RX ADMIN — HYDROCORTISONE SODIUM SUCCINATE SCH MG: 100 INJECTION, POWDER, FOR SOLUTION INTRAMUSCULAR; INTRAVENOUS at 21:22

## 2020-04-16 RX ADMIN — Medication SCH MG: at 10:21

## 2020-04-16 RX ADMIN — INSULIN HUMAN SCH: 100 INJECTION, SOLUTION PARENTERAL at 19:41

## 2020-04-16 RX ADMIN — INSULIN HUMAN SCH UNIT: 100 INJECTION, SOLUTION PARENTERAL at 03:16

## 2020-04-16 RX ADMIN — HYDROCORTISONE SODIUM SUCCINATE SCH MG: 100 INJECTION, POWDER, FOR SOLUTION INTRAMUSCULAR; INTRAVENOUS at 15:51

## 2020-04-16 RX ADMIN — POTASSIUM CHLORIDE SCH MLS/HR: 29.8 INJECTION, SOLUTION INTRAVENOUS at 10:24

## 2020-04-16 RX ADMIN — ENOXAPARIN SODIUM SCH MG: 100 INJECTION SUBCUTANEOUS at 21:18

## 2020-04-16 RX ADMIN — INSULIN HUMAN SCH UNIT: 100 INJECTION, SOLUTION PARENTERAL at 15:59

## 2020-04-16 RX ADMIN — INSULIN GLARGINE SCH UNIT: 100 INJECTION, SOLUTION SUBCUTANEOUS at 18:34

## 2020-04-16 RX ADMIN — POTASSIUM CHLORIDE SCH MLS/HR: 29.8 INJECTION, SOLUTION INTRAVENOUS at 08:35

## 2020-04-16 RX ADMIN — MIDAZOLAM HYDROCHLORIDE PRN MLS/HR: 5 INJECTION, SOLUTION INTRAMUSCULAR; INTRAVENOUS at 23:35

## 2020-04-16 RX ADMIN — Medication SCH MG: at 21:18

## 2020-04-16 RX ADMIN — MIDAZOLAM HYDROCHLORIDE PRN MG: 1 INJECTION, SOLUTION INTRAMUSCULAR; INTRAVENOUS at 01:30

## 2020-04-16 RX ADMIN — HYDROMORPHONE HYDROCHLORIDE PRN MG: 2 INJECTION INTRAMUSCULAR; INTRAVENOUS; SUBCUTANEOUS at 19:15

## 2020-04-16 RX ADMIN — FAMOTIDINE SCH MG: 20 TABLET, FILM COATED ORAL at 10:21

## 2020-04-16 RX ADMIN — MINERAL OIL AND WHITE PETROLATUM SCH APPLIC: 150; 830 OINTMENT OPHTHALMIC at 21:16

## 2020-04-16 RX ADMIN — INSULIN HUMAN SCH UNIT: 100 INJECTION, SOLUTION PARENTERAL at 10:23

## 2020-04-16 RX ADMIN — Medication SCH: at 21:22

## 2020-04-16 RX ADMIN — INSULIN HUMAN SCH UNIT: 100 INJECTION, SOLUTION PARENTERAL at 06:27

## 2020-04-16 RX ADMIN — FAMOTIDINE SCH MG: 20 TABLET, FILM COATED ORAL at 21:18

## 2020-04-16 RX ADMIN — MIDAZOLAM HYDROCHLORIDE PRN MLS/HR: 5 INJECTION, SOLUTION INTRAMUSCULAR; INTRAVENOUS at 15:57

## 2020-04-16 RX ADMIN — Medication SCH: at 14:28

## 2020-04-16 RX ADMIN — MINERAL OIL AND WHITE PETROLATUM SCH APPLIC: 150; 830 OINTMENT OPHTHALMIC at 15:51

## 2020-04-16 RX ADMIN — MIDAZOLAM HYDROCHLORIDE PRN MLS/HR: 5 INJECTION, SOLUTION INTRAMUSCULAR; INTRAVENOUS at 19:30

## 2020-04-16 RX ADMIN — INSULIN HUMAN SCH UNIT: 100 INJECTION, SOLUTION PARENTERAL at 21:17

## 2020-04-16 RX ADMIN — HYDROMORPHONE HYDROCHLORIDE PRN MG: 2 INJECTION INTRAMUSCULAR; INTRAVENOUS; SUBCUTANEOUS at 16:15

## 2020-04-16 RX ADMIN — MIDAZOLAM HYDROCHLORIDE PRN MLS/HR: 5 INJECTION, SOLUTION INTRAMUSCULAR; INTRAVENOUS at 04:55

## 2020-04-16 RX ADMIN — MIDAZOLAM HYDROCHLORIDE PRN MG: 1 INJECTION, SOLUTION INTRAMUSCULAR; INTRAVENOUS at 03:26

## 2020-04-16 RX ADMIN — HYDROCORTISONE SODIUM SUCCINATE SCH MG: 100 INJECTION, POWDER, FOR SOLUTION INTRAMUSCULAR; INTRAVENOUS at 06:29

## 2020-04-16 NOTE — PDOC CRITICAL CARE PROG REPORT
General


Date:: 04/16/20


ICU Day:: 6


Ventilator Day:: 6


Hospital Day:: 11


Resuscitation Status: Full Code


Medical Power of : DaughterMaryuri


Events in the past 12 to 24 Hours:: 





Stabilized on vent support.


Review of systems relevant to events:: 





Respiratory and neurological.


Reason for ICU Addmission:: Acute hypoxic respiratory failure with SARS, 2-

CoViD19





- Medications:


Medications reviewed and adjusted accordingly: Yes


Vasopressors:: 





None


Sedation:: 





Propofol and versed.





Physical Exam


Vital Signs: 


                                        











Temp Pulse Resp BP Pulse Ox


 


 99.0 F   94   29 H  116/61   97 


 


 04/16/20 09:00  04/15/20 20:00  04/16/20 09:00  04/15/20 16:50  04/16/20 09:00








                                 Intake & Output











 04/15/20 04/16/20 04/17/20





 06:59 06:59 06:59


 


Intake Total 1205 1786 348


 


Output Total 2715 2259 350


 


Balance -1510 -473 -2


 


Weight 96.9 kg 98.8 kg 








                                  Weight/Height





Weight                           98.8 kg


Height                           5 ft 2 in








General appearance: PRESENT: no acute distress


Head exam: PRESENT: atraumatic, normocephalic


Eye exam: PRESENT: conjunctiva pink, EOMI, PERRLA.  ABSENT: scleral icterus


Ear exam: PRESENT: normal external ear exam


Mouth exam: PRESENT: moist, tongue midline


Respiratory exam: PRESENT: symmetrical, unlabored


Cardiovascular exam: PRESENT: RRR, tachycardia.  ABSENT: diastolic murmur, rubs,

systolic murmur


Pulses: PRESENT: normal dorsalis pedis pul


GI/Abdominal exam: PRESENT: normal bowel sounds, soft.  ABSENT: distended, 

guarding, mass, organolmegaly, rebound, tenderness


Rectal exam: PRESENT: deferred


Gentrourinary exam: PRESENT: indwelling catheter


Extremities exam: PRESENT: full ROM.  ABSENT: calf tenderness, clubbing, pedal 

edema


Neurological exam: PRESENT: other - Sedated


Psychiatric exam: PRESENT: agitated, other - She is agitated at times requiring 

high sedation.


Skin exam: PRESENT: dry, intact, warm.  ABSENT: cyanosis, rash





Laboratory/Radiographs


Laboratory Results: 


                                        





                                 04/16/20 06:00 





                                 04/16/20 06:00 





                                        











  04/15/20 04/16/20 04/16/20





  11:12 06:00 06:00


 


WBC   


 


RBC   


 


Hgb   


 


Hct   


 


MCV   


 


MCH   


 


MCHC   


 


RDW   


 


Plt Count   


 


Seg Neutrophils %   


 


Carbonic Acid   1.38 H 


 


HCO3/H2CO3 Ratio   19:1 


 


ABG pH   7.38 


 


ABG pCO2   45.7 H 


 


ABG pO2   45.0 L 


 


ABG HCO3   26.6 H 


 


ABG O2 Saturation   80.0 L 


 


ABG Base Excess   1.2 


 


FiO2   90% 


 


Sodium    141.2


 


Potassium    2.7 L*


 


Chloride    103


 


Carbon Dioxide    27


 


Anion Gap    11


 


BUN    34 H


 


Creatinine    1.04


 


Est GFR ( Amer)    > 60


 


Glucose    313 H


 


Lactic Acid  3.5 H  


 


Calcium    7.5 L


 


Phosphorus    3.9


 


Magnesium    2.4 H


 


Triglycerides    327 H














  04/16/20 04/16/20





  06:00 06:00


 


WBC  15.8 H 


 


RBC  3.23 L 


 


Hgb  9.1 L 


 


Hct  27.3 L 


 


MCV  85 


 


MCH  28.3 


 


MCHC  33.4 


 


RDW  15.1 H 


 


Plt Count  303 


 


Seg Neutrophils %  Not Reportable 


 


Carbonic Acid  


 


HCO3/H2CO3 Ratio  


 


ABG pH  


 


ABG pCO2  


 


ABG pO2  


 


ABG HCO3  


 


ABG O2 Saturation  


 


ABG Base Excess  


 


FiO2  


 


Sodium  


 


Potassium  


 


Chloride  


 


Carbon Dioxide  


 


Anion Gap  


 


BUN  


 


Creatinine  


 


Est GFR (African Amer)  


 


Glucose  


 


Lactic Acid   3.8 H


 


Calcium  


 


Phosphorus  


 


Magnesium  


 


Triglycerides  








                                        





04/11/20 18:45   Bronchial Washings   Gram Stain - Final


04/11/20 18:45   Bronchial Washings   Bronchial Washings Culture - Final


                            Yeast, Not Candida Albicans


                            Normal Tamica Absent





                                        











  04/05/20 04/10/20 04/10/20





  14:00 11:48 11:48


 


Creatine Kinase   105 


 


CK-MB (CK-2)    0.87


 


Troponin I    < 0.012


 


NT-Pro-B Natriuret Pep  32  














  04/10/20 04/10/20 04/11/20





  22:45 22:45 04:45


 


Creatine Kinase  60   46


 


CK-MB (CK-2)   0.41 


 


Troponin I   0.018 


 


NT-Pro-B Natriuret Pep   














  04/11/20





  04:45


 


Creatine Kinase 


 


CK-MB (CK-2)  0.47


 


Troponin I  < 0.012


 


NT-Pro-B Natriuret Pep 











Impressions: 


                                        





Chest/Abdomen CTA  04/05/20 16:08


IMPRESSION:


1. Patchy peripheral and perihilar ground-glass opacities in both lungs.  These 

are commonly reported imaging features of COVID-19   pneumonia are present. 

Other processes such as influenza pneumonia and organizing pneumonia, as can be 

seen with drug toxicity and connective tissue disease, can cause a similar 

imaging pattern. PneTyp


2. Limited evaluation of the pulmonary arteries due to contrast bolus timing.  

No large central pulmonary embolus.  Evaluation of the segmental and 

subsegmental pulmonary arteries is limited.


 











All labs, radiographs, diagnostic studies and EKGs were personally reviewed: Yes


In addition, reports of radiographic and diagnostic studies were read: Yes





Assessment and Plan





- Diagnosis


(1) COVID-19 virus infection


Is this a current diagnosis for this admission?: Yes   


Plan: 


So far she requiring the ventilator and somewhat high ventilator support making 

meaningful weaning unlikely today.








(2) Acute hypoxemic respiratory failure


Is this a current diagnosis for this admission?: Yes   


Plan: 


She is fluctuating between 90-75%.








(3) T2DM (type 2 diabetes mellitus)


Qualifiers: 


   Diabetes mellitus long term insulin use: without long term use   Diabetes 

mellitus complication status: without complication   Qualified Code(s): E11.9 - 

Type 2 diabetes mellitus without complications   


Is this a current diagnosis for this admission?: Yes   


Plan: 


Needs better control.





Plan Summary: 





Not much in the way of weaning today. Increase insulin dose.





Critical Time


Critical Time (minutes): 35


Level of Care: ICU


Anticipated discharge: SNF


Within: Other


-: 


1.  The care of a critical patient is a dynamic process.  This note is a 

representative synopsis but static in nature.  The timeframe for treatments 

given in order is not necessarily the actual time these treatments may have been

done.





2.  This patient requires critical care secondary to ongoing requirements for 

therapy not offered or safe outside the critical care environment.  Transfer to 

a lower level of care will result in altered life or limb morbidity and 

mortality.





3.  Multidisciplinary rounds completed.





4.  ABCDE bundle addressed.

## 2020-04-16 NOTE — RADIOLOGY REPORT (SQ)
AP Portable chest: 4/16/2020 5:08 AM CDT



History: 70-year old patient with respiratory failure.



Comparison: Chest radiograph performed 4/10/2020.



Findings: The cardiomediastinal silhouette is enlarged. No

pneumothorax is seen. There are diffuse bilateral airspace

opacities, most pronounced within the periphery. These are

concerning for possible viral infection. An endotracheal tube tip

projects approximately  5.0   cm above the ale. The

nasogastric tube tip is seen overlying the right upper quadrant

of the abdomen, projecting within the duodenum. There is

elevation of the right hemidiaphragm. The lung volumes are low.

Mild interstitial prominence is seen.



Impression: There are diffuse bilateral airspace opacities most

pronounced within the periphery. These may reflect developing

atypical infection.

## 2020-04-17 LAB
ABSOLUTE LYMPHOCYTES# (MANUAL): 0 10^3/UL (ref 0.5–4.7)
ABSOLUTE MONOCYTES # (MANUAL): 0.2 10^3/UL (ref 0.1–1.4)
ADD MANUAL DIFF: YES
ANION GAP SERPL CALC-SCNC: 10 MMOL/L (ref 5–19)
ANISOCYTOSIS BLD QL SMEAR: SLIGHT
APPEARANCE UR: (no result)
APTT PPP: YELLOW S
BASOPHILS NFR BLD MANUAL: 0 % (ref 0–2)
BILIRUB UR QL STRIP: NEGATIVE
BUN SERPL-MCNC: 28 MG/DL (ref 7–20)
CALCIUM: 8.1 MG/DL (ref 8.4–10.2)
CHLORIDE SERPL-SCNC: 105 MMOL/L (ref 98–107)
CO2 SERPL-SCNC: 28 MMOL/L (ref 22–30)
EOSINOPHIL NFR BLD MANUAL: 0 % (ref 0–6)
ERYTHROCYTE [DISTWIDTH] IN BLOOD BY AUTOMATED COUNT: 14.8 % (ref 11.5–14)
GLUCOSE SERPL-MCNC: 219 MG/DL (ref 75–110)
GLUCOSE UR STRIP-MCNC: NEGATIVE MG/DL
HCT VFR BLD CALC: 27.5 % (ref 36–47)
HGB BLD-MCNC: 9.4 G/DL (ref 12–15.5)
KETONES UR STRIP-MCNC: NEGATIVE MG/DL
MCH RBC QN AUTO: 28.4 PG (ref 27–33.4)
MCHC RBC AUTO-ENTMCNC: 34.2 G/DL (ref 32–36)
MCV RBC AUTO: 83 FL (ref 80–97)
MONOCYTES % (MANUAL): 1 % (ref 3–13)
NITRITE UR QL STRIP: NEGATIVE
NRBC BLD AUTO-RTO: 1 /100 WBC
PH UR STRIP: 6 [PH] (ref 5–9)
PHOSPHATE SERPL-MCNC: 2 MG/DL (ref 2.5–4.5)
PLATELET # BLD: 300 10^3/UL (ref 150–450)
PLATELET COMMENT: ADEQUATE
POTASSIUM SERPL-SCNC: 3.3 MMOL/L (ref 3.6–5)
PROT UR STRIP-MCNC: 100 MG/DL
RBC # BLD AUTO: 3.32 10^6/UL (ref 3.72–5.28)
SEGMENTED NEUTROPHILS % (MAN): 99 % (ref 42–78)
SP GR UR STRIP: 1.02
TOTAL CELLS COUNTED BLD: 100
UROBILINOGEN UR-MCNC: NEGATIVE MG/DL (ref ?–2)
VARIANT LYMPHS NFR BLD MANUAL: 0 % (ref 13–45)
WBC # BLD AUTO: 17.1 10^3/UL (ref 4–10.5)

## 2020-04-17 RX ADMIN — MINERAL OIL AND WHITE PETROLATUM SCH APPLIC: 150; 830 OINTMENT OPHTHALMIC at 22:10

## 2020-04-17 RX ADMIN — Medication SCH: at 05:44

## 2020-04-17 RX ADMIN — Medication SCH MG: at 22:10

## 2020-04-17 RX ADMIN — MINERAL OIL AND WHITE PETROLATUM SCH APPLIC: 150; 830 OINTMENT OPHTHALMIC at 15:45

## 2020-04-17 RX ADMIN — HYDROCORTISONE SODIUM SUCCINATE SCH MG: 100 INJECTION, POWDER, FOR SOLUTION INTRAMUSCULAR; INTRAVENOUS at 06:00

## 2020-04-17 RX ADMIN — INSULIN HUMAN SCH: 100 INJECTION, SOLUTION PARENTERAL at 19:06

## 2020-04-17 RX ADMIN — MIDAZOLAM HYDROCHLORIDE PRN MLS/HR: 5 INJECTION, SOLUTION INTRAMUSCULAR; INTRAVENOUS at 03:26

## 2020-04-17 RX ADMIN — NOREPINEPHRINE BITARTRATE PRN MLS/HR: 1 INJECTION, SOLUTION, CONCENTRATE INTRAVENOUS at 23:10

## 2020-04-17 RX ADMIN — Medication SCH: at 15:45

## 2020-04-17 RX ADMIN — HYDROCORTISONE SODIUM SUCCINATE SCH MG: 100 INJECTION, POWDER, FOR SOLUTION INTRAMUSCULAR; INTRAVENOUS at 22:08

## 2020-04-17 RX ADMIN — INSULIN HUMAN SCH UNIT: 100 INJECTION, SOLUTION PARENTERAL at 22:08

## 2020-04-17 RX ADMIN — MIDAZOLAM HYDROCHLORIDE PRN MLS/HR: 5 INJECTION, SOLUTION INTRAMUSCULAR; INTRAVENOUS at 06:56

## 2020-04-17 RX ADMIN — MINERAL OIL AND WHITE PETROLATUM SCH APPLIC: 150; 830 OINTMENT OPHTHALMIC at 05:59

## 2020-04-17 RX ADMIN — OXYCODONE HYDROCHLORIDE AND ACETAMINOPHEN SCH MG: 500 TABLET ORAL at 10:14

## 2020-04-17 RX ADMIN — VITAMIN D, TAB 1000IU (100/BT) SCH UNIT: 25 TAB at 10:14

## 2020-04-17 RX ADMIN — Medication SCH: at 22:10

## 2020-04-17 RX ADMIN — MIDAZOLAM HYDROCHLORIDE PRN MLS/HR: 5 INJECTION, SOLUTION INTRAMUSCULAR; INTRAVENOUS at 19:50

## 2020-04-17 RX ADMIN — HYDROMORPHONE HYDROCHLORIDE PRN MG: 2 INJECTION INTRAMUSCULAR; INTRAVENOUS; SUBCUTANEOUS at 08:37

## 2020-04-17 RX ADMIN — HYDROCODONE BITARTRATE AND ACETAMINOPHEN SCH ML: 7.5; 325 SOLUTION ORAL at 23:05

## 2020-04-17 RX ADMIN — HYDROMORPHONE HYDROCHLORIDE PRN MG: 2 INJECTION INTRAMUSCULAR; INTRAVENOUS; SUBCUTANEOUS at 13:24

## 2020-04-17 RX ADMIN — FAMOTIDINE SCH MG: 20 TABLET, FILM COATED ORAL at 10:15

## 2020-04-17 RX ADMIN — FAMOTIDINE SCH MG: 20 TABLET, FILM COATED ORAL at 22:10

## 2020-04-17 RX ADMIN — Medication SCH MG: at 10:14

## 2020-04-17 RX ADMIN — ENOXAPARIN SODIUM SCH: 100 INJECTION SUBCUTANEOUS at 10:16

## 2020-04-17 RX ADMIN — INSULIN HUMAN SCH UNIT: 100 INJECTION, SOLUTION PARENTERAL at 15:43

## 2020-04-17 RX ADMIN — METHOCARBAMOL SCH MG: 500 TABLET ORAL at 23:39

## 2020-04-17 RX ADMIN — INSULIN HUMAN SCH UNIT: 100 INJECTION, SOLUTION PARENTERAL at 10:13

## 2020-04-17 RX ADMIN — DIBASIC SODIUM PHOSPHATE, MONOBASIC POTASSIUM PHOSPHATE AND MONOBASIC SODIUM PHOSPHATE SCH MG: 852; 155; 130 TABLET ORAL at 15:43

## 2020-04-17 RX ADMIN — INSULIN HUMAN SCH: 100 INJECTION, SOLUTION PARENTERAL at 05:43

## 2020-04-17 RX ADMIN — ENOXAPARIN SODIUM SCH MG: 40 INJECTION SUBCUTANEOUS at 10:13

## 2020-04-17 RX ADMIN — INSULIN HUMAN SCH: 100 INJECTION, SOLUTION PARENTERAL at 04:13

## 2020-04-17 RX ADMIN — LINEZOLID SCH MLS/HR: 600 INJECTION, SOLUTION INTRAVENOUS at 06:00

## 2020-04-17 RX ADMIN — OXYCODONE HYDROCHLORIDE AND ACETAMINOPHEN SCH MG: 500 TABLET ORAL at 19:06

## 2020-04-17 RX ADMIN — HYDROCORTISONE SODIUM SUCCINATE SCH MG: 100 INJECTION, POWDER, FOR SOLUTION INTRAMUSCULAR; INTRAVENOUS at 15:42

## 2020-04-17 RX ADMIN — HYDROMORPHONE HYDROCHLORIDE PRN MG: 2 INJECTION INTRAMUSCULAR; INTRAVENOUS; SUBCUTANEOUS at 19:07

## 2020-04-17 RX ADMIN — DIBASIC SODIUM PHOSPHATE, MONOBASIC POTASSIUM PHOSPHATE AND MONOBASIC SODIUM PHOSPHATE SCH MG: 852; 155; 130 TABLET ORAL at 22:10

## 2020-04-17 RX ADMIN — HYDROMORPHONE HYDROCHLORIDE PRN MG: 2 INJECTION INTRAMUSCULAR; INTRAVENOUS; SUBCUTANEOUS at 04:53

## 2020-04-17 RX ADMIN — INSULIN GLARGINE SCH UNIT: 100 INJECTION, SOLUTION SUBCUTANEOUS at 19:06

## 2020-04-17 RX ADMIN — MIDAZOLAM HYDROCHLORIDE PRN MLS/HR: 5 INJECTION, SOLUTION INTRAMUSCULAR; INTRAVENOUS at 23:15

## 2020-04-17 RX ADMIN — DIBASIC SODIUM PHOSPHATE, MONOBASIC POTASSIUM PHOSPHATE AND MONOBASIC SODIUM PHOSPHATE SCH MG: 852; 155; 130 TABLET ORAL at 10:13

## 2020-04-17 RX ADMIN — LINEZOLID SCH MLS/HR: 600 INJECTION, SOLUTION INTRAVENOUS at 19:06

## 2020-04-17 RX ADMIN — INSULIN GLARGINE SCH UNIT: 100 INJECTION, SOLUTION SUBCUTANEOUS at 10:15

## 2020-04-17 RX ADMIN — MIDAZOLAM HYDROCHLORIDE PRN MLS/HR: 5 INJECTION, SOLUTION INTRAMUSCULAR; INTRAVENOUS at 10:28

## 2020-04-17 RX ADMIN — SODIUM CHLORIDE, SODIUM LACTATE, POTASSIUM CHLORIDE, AND CALCIUM CHLORIDE PRN MLS/HR: .6; .31; .03; .02 INJECTION, SOLUTION INTRAVENOUS at 23:05

## 2020-04-17 RX ADMIN — MIDAZOLAM HYDROCHLORIDE PRN MLS/HR: 5 INJECTION, SOLUTION INTRAMUSCULAR; INTRAVENOUS at 15:28

## 2020-04-17 NOTE — RADIOLOGY REPORT (SQ)
EXAM DESCRIPTION:  CHEST SINGLE VIEW



IMAGES COMPLETED DATE/TIME:  4/17/2020 9:48 am



REASON FOR STUDY:  Had R subclavian TLC placed.



COMPARISON:  AP view of the chest from 4/16/2020.



EXAM PARAMETERS:  NUMBER OF VIEWS: One view.

TECHNIQUE:  An AP view of the chest was obtained.

RADIATION DOSE: NA

LIMITATIONS: None.



FINDINGS:  LUNGS AND PLEURA: Unchanged diffuse bilateral and peripheral interstitial and alveolar opa
cities.  The left lateral costophrenic sulcus is blunted.  There is no pneumothorax.

MEDIASTINUM AND HILAR STRUCTURES: Stable mediastinal and hilar contours.

HEART AND VASCULAR STRUCTURES: Stable cardiac silhouette.  The pulmonary vasculature remains indistin
ct.

BONES: No acute findings.

HARDWARE: The tip of the right subclavian central venous catheter projects within the right atrium.  
The tip of the endotracheal tube projects 4.2 cm above the ale.  The tip of the enteric tube proje
cts within the gastric lumen

OTHER: Since the prior radiograph the patient has developed extensive bilateral supraclavicular subcu
taneous emphysema.



IMPRESSION:  1. Interval placement of a right subclavian central venous catheter and development of b
ilateral supraclavicular subcutaneous emphysema.  There is no pneumothorax.

2.  Unchanged diffuse bilateral peripheral interstitial and alveolar opacities.



TECHNICAL DOCUMENTATION:  JOB ID:  [id number]

 2011 [employer]- All Rights Reserved



Reading location - IP/workstation name: [email or web address]

## 2020-04-18 LAB
ABSOLUTE LYMPHOCYTES# (MANUAL): 0.8 10^3/UL (ref 0.5–4.7)
ABSOLUTE MONOCYTES # (MANUAL): 0.4 10^3/UL (ref 0.1–1.4)
ADD MANUAL DIFF: YES
ANION GAP SERPL CALC-SCNC: 7 MMOL/L (ref 5–19)
ANISOCYTOSIS BLD QL SMEAR: (no result)
ARTERIAL BLOOD FIO2: (no result)
ARTERIAL BLOOD H2CO3: 1.3 MMOL/L (ref 1.05–1.35)
ARTERIAL BLOOD H2CO3: 1.32 MMOL/L (ref 1.05–1.35)
ARTERIAL BLOOD H2CO3: 1.36 MMOL/L (ref 1.05–1.35)
ARTERIAL BLOOD HCO3: 26.5 MMOL/L (ref 20–24)
ARTERIAL BLOOD HCO3: 27.6 MMOL/L (ref 20–24)
ARTERIAL BLOOD HCO3: 27.7 MMOL/L (ref 20–24)
ARTERIAL BLOOD PCO2: 43.1 MMHG (ref 35–45)
ARTERIAL BLOOD PCO2: 43.8 MMHG (ref 35–45)
ARTERIAL BLOOD PCO2: 45.3 MMHG (ref 35–45)
ARTERIAL BLOOD PH: 7.4 (ref 7.35–7.45)
ARTERIAL BLOOD PH: 7.41 (ref 7.35–7.45)
ARTERIAL BLOOD PH: 7.42 (ref 7.35–7.45)
ARTERIAL BLOOD PO2: 39.2 MMHG (ref 80–100)
ARTERIAL BLOOD PO2: 52.4 MMHG (ref 80–100)
ARTERIAL BLOOD PO2: 52.6 MMHG (ref 80–100)
ARTERIAL BLOOD TOTAL CO2: 27.8 MMOL/L (ref 21–25)
ARTERIAL BLOOD TOTAL CO2: 29 MMOL/L (ref 21–25)
ARTERIAL BLOOD TOTAL CO2: 29.1 MMOL/L (ref 21–25)
BASE EXCESS BLDA CALC-SCNC: 1.6 MMOL/L
BASE EXCESS BLDA CALC-SCNC: 2.6 MMOL/L
BASE EXCESS BLDA CALC-SCNC: 2.7 MMOL/L
BASOPHILS NFR BLD MANUAL: 0 % (ref 0–2)
BUN SERPL-MCNC: 32 MG/DL (ref 7–20)
CALCIUM: 7.8 MG/DL (ref 8.4–10.2)
CHLORIDE SERPL-SCNC: 108 MMOL/L (ref 98–107)
CO2 SERPL-SCNC: 29 MMOL/L (ref 22–30)
EOSINOPHIL NFR BLD MANUAL: 0 % (ref 0–6)
ERYTHROCYTE [DISTWIDTH] IN BLOOD BY AUTOMATED COUNT: 15.2 % (ref 11.5–14)
GLUCOSE SERPL-MCNC: 127 MG/DL (ref 75–110)
HCT VFR BLD CALC: 25.8 % (ref 36–47)
HGB BLD-MCNC: 8.7 G/DL (ref 12–15.5)
MCH RBC QN AUTO: 28.3 PG (ref 27–33.4)
MCHC RBC AUTO-ENTMCNC: 33.8 G/DL (ref 32–36)
MCV RBC AUTO: 84 FL (ref 80–97)
MONOCYTES % (MANUAL): 3 % (ref 3–13)
NRBC BLD AUTO-RTO: 2 /100 WBC
PHOSPHATE SERPL-MCNC: 3.7 MG/DL (ref 2.5–4.5)
PLATELET # BLD: 216 10^3/UL (ref 150–450)
PLATELET COMMENT: ADEQUATE
POTASSIUM SERPL-SCNC: 3.6 MMOL/L (ref 3.6–5)
RBC # BLD AUTO: 3.08 10^6/UL (ref 3.72–5.28)
SAO2 % BLDA: 74.6 % (ref 94–98)
SAO2 % BLDA: 87 % (ref 94–98)
SAO2 % BLDA: 87.4 % (ref 94–98)
SEGMENTED NEUTROPHILS % (MAN): 91 % (ref 42–78)
TOTAL CELLS COUNTED BLD: 100
VARIANT LYMPHS NFR BLD MANUAL: 6 % (ref 13–45)
WBC # BLD AUTO: 13.6 10^3/UL (ref 4–10.5)

## 2020-04-18 RX ADMIN — HYDROCORTISONE SODIUM SUCCINATE SCH MG: 100 INJECTION, POWDER, FOR SOLUTION INTRAMUSCULAR; INTRAVENOUS at 15:01

## 2020-04-18 RX ADMIN — INSULIN HUMAN SCH: 100 INJECTION, SOLUTION PARENTERAL at 18:45

## 2020-04-18 RX ADMIN — MIDAZOLAM HYDROCHLORIDE PRN MLS/HR: 5 INJECTION, SOLUTION INTRAMUSCULAR; INTRAVENOUS at 05:18

## 2020-04-18 RX ADMIN — FAMOTIDINE SCH MG: 20 TABLET, FILM COATED ORAL at 10:39

## 2020-04-18 RX ADMIN — Medication SCH MG: at 22:09

## 2020-04-18 RX ADMIN — INSULIN HUMAN SCH: 100 INJECTION, SOLUTION PARENTERAL at 01:55

## 2020-04-18 RX ADMIN — MINERAL OIL AND WHITE PETROLATUM SCH APPLIC: 150; 830 OINTMENT OPHTHALMIC at 06:05

## 2020-04-18 RX ADMIN — HYDROMORPHONE HYDROCHLORIDE PRN MG: 2 INJECTION INTRAMUSCULAR; INTRAVENOUS; SUBCUTANEOUS at 03:57

## 2020-04-18 RX ADMIN — METHOCARBAMOL SCH MG: 500 TABLET ORAL at 06:06

## 2020-04-18 RX ADMIN — HYDROCODONE BITARTRATE AND ACETAMINOPHEN SCH ML: 7.5; 325 SOLUTION ORAL at 15:01

## 2020-04-18 RX ADMIN — Medication SCH: at 06:06

## 2020-04-18 RX ADMIN — MIDAZOLAM HYDROCHLORIDE PRN MLS/HR: 5 INJECTION, SOLUTION INTRAMUSCULAR; INTRAVENOUS at 13:00

## 2020-04-18 RX ADMIN — Medication SCH MG: at 10:39

## 2020-04-18 RX ADMIN — POTASSIUM CHLORIDE SCH MLS/HR: 29.8 INJECTION, SOLUTION INTRAVENOUS at 20:13

## 2020-04-18 RX ADMIN — HYDROMORPHONE HYDROCHLORIDE PRN MG: 2 INJECTION INTRAMUSCULAR; INTRAVENOUS; SUBCUTANEOUS at 12:34

## 2020-04-18 RX ADMIN — HYDROMORPHONE HYDROCHLORIDE PRN MG: 2 INJECTION INTRAMUSCULAR; INTRAVENOUS; SUBCUTANEOUS at 15:44

## 2020-04-18 RX ADMIN — NOREPINEPHRINE BITARTRATE PRN MLS/HR: 1 INJECTION, SOLUTION, CONCENTRATE INTRAVENOUS at 18:48

## 2020-04-18 RX ADMIN — MIDAZOLAM HYDROCHLORIDE PRN MLS/HR: 5 INJECTION, SOLUTION INTRAMUSCULAR; INTRAVENOUS at 02:10

## 2020-04-18 RX ADMIN — INSULIN HUMAN SCH: 100 INJECTION, SOLUTION PARENTERAL at 09:44

## 2020-04-18 RX ADMIN — OXYCODONE HYDROCHLORIDE AND ACETAMINOPHEN SCH MG: 500 TABLET ORAL at 10:39

## 2020-04-18 RX ADMIN — MINERAL OIL AND WHITE PETROLATUM SCH APPLIC: 150; 830 OINTMENT OPHTHALMIC at 15:02

## 2020-04-18 RX ADMIN — HYDROCODONE BITARTRATE AND ACETAMINOPHEN SCH ML: 7.5; 325 SOLUTION ORAL at 22:08

## 2020-04-18 RX ADMIN — ENOXAPARIN SODIUM SCH MG: 40 INJECTION SUBCUTANEOUS at 10:40

## 2020-04-18 RX ADMIN — SODIUM CHLORIDE, SODIUM LACTATE, POTASSIUM CHLORIDE, AND CALCIUM CHLORIDE PRN MLS/HR: .6; .31; .03; .02 INJECTION, SOLUTION INTRAVENOUS at 06:08

## 2020-04-18 RX ADMIN — SODIUM CHLORIDE, SODIUM LACTATE, POTASSIUM CHLORIDE, AND CALCIUM CHLORIDE PRN MLS/HR: .6; .31; .03; .02 INJECTION, SOLUTION INTRAVENOUS at 15:02

## 2020-04-18 RX ADMIN — METHOCARBAMOL SCH MG: 500 TABLET ORAL at 15:01

## 2020-04-18 RX ADMIN — METHOCARBAMOL SCH MG: 500 TABLET ORAL at 22:09

## 2020-04-18 RX ADMIN — INSULIN HUMAN SCH: 100 INJECTION, SOLUTION PARENTERAL at 15:00

## 2020-04-18 RX ADMIN — Medication SCH: at 22:09

## 2020-04-18 RX ADMIN — INSULIN HUMAN SCH: 100 INJECTION, SOLUTION PARENTERAL at 06:05

## 2020-04-18 RX ADMIN — HYDROMORPHONE HYDROCHLORIDE PRN MG: 2 INJECTION INTRAMUSCULAR; INTRAVENOUS; SUBCUTANEOUS at 00:59

## 2020-04-18 RX ADMIN — MIDAZOLAM HYDROCHLORIDE PRN MLS/HR: 5 INJECTION, SOLUTION INTRAMUSCULAR; INTRAVENOUS at 22:50

## 2020-04-18 RX ADMIN — INSULIN HUMAN SCH: 100 INJECTION, SOLUTION PARENTERAL at 22:42

## 2020-04-18 RX ADMIN — OXYCODONE HYDROCHLORIDE AND ACETAMINOPHEN SCH MG: 500 TABLET ORAL at 18:46

## 2020-04-18 RX ADMIN — MIDAZOLAM HYDROCHLORIDE PRN MLS/HR: 5 INJECTION, SOLUTION INTRAMUSCULAR; INTRAVENOUS at 16:30

## 2020-04-18 RX ADMIN — ALBUMIN (HUMAN) SCH MLS/HR: 12.5 SOLUTION INTRAVENOUS at 22:03

## 2020-04-18 RX ADMIN — ROCURONIUM BROMIDE PRN MLS/HR: 10 INJECTION, SOLUTION INTRAVENOUS at 23:40

## 2020-04-18 RX ADMIN — VITAMIN D, TAB 1000IU (100/BT) SCH UNIT: 25 TAB at 10:39

## 2020-04-18 RX ADMIN — HYDROCODONE BITARTRATE AND ACETAMINOPHEN SCH ML: 7.5; 325 SOLUTION ORAL at 06:06

## 2020-04-18 RX ADMIN — MINERAL OIL AND WHITE PETROLATUM SCH APPLIC: 150; 830 OINTMENT OPHTHALMIC at 22:07

## 2020-04-18 RX ADMIN — Medication SCH: at 15:01

## 2020-04-18 RX ADMIN — MIDAZOLAM HYDROCHLORIDE PRN MLS/HR: 5 INJECTION, SOLUTION INTRAMUSCULAR; INTRAVENOUS at 19:38

## 2020-04-18 RX ADMIN — NOREPINEPHRINE BITARTRATE PRN MLS/HR: 1 INJECTION, SOLUTION, CONCENTRATE INTRAVENOUS at 06:15

## 2020-04-18 RX ADMIN — INSULIN GLARGINE SCH UNIT: 100 INJECTION, SOLUTION SUBCUTANEOUS at 10:39

## 2020-04-18 RX ADMIN — HYDROCODONE BITARTRATE AND ACETAMINOPHEN SCH ML: 7.5; 325 SOLUTION ORAL at 18:46

## 2020-04-18 RX ADMIN — HYDROMORPHONE HYDROCHLORIDE PRN MG: 2 INJECTION INTRAMUSCULAR; INTRAVENOUS; SUBCUTANEOUS at 09:11

## 2020-04-18 RX ADMIN — HYDROMORPHONE HYDROCHLORIDE PRN MG: 2 INJECTION INTRAMUSCULAR; INTRAVENOUS; SUBCUTANEOUS at 19:01

## 2020-04-18 RX ADMIN — HYDROCORTISONE SODIUM SUCCINATE SCH MG: 100 INJECTION, POWDER, FOR SOLUTION INTRAMUSCULAR; INTRAVENOUS at 06:06

## 2020-04-18 RX ADMIN — INSULIN GLARGINE SCH: 100 INJECTION, SOLUTION SUBCUTANEOUS at 18:45

## 2020-04-18 RX ADMIN — HYDROCORTISONE SODIUM SUCCINATE SCH MG: 100 INJECTION, POWDER, FOR SOLUTION INTRAMUSCULAR; INTRAVENOUS at 22:09

## 2020-04-18 RX ADMIN — POTASSIUM CHLORIDE SCH MLS/HR: 29.8 INJECTION, SOLUTION INTRAVENOUS at 22:02

## 2020-04-18 RX ADMIN — FAMOTIDINE SCH MG: 20 TABLET, FILM COATED ORAL at 22:09

## 2020-04-18 RX ADMIN — MIDAZOLAM HYDROCHLORIDE PRN MLS/HR: 5 INJECTION, SOLUTION INTRAMUSCULAR; INTRAVENOUS at 09:51

## 2020-04-18 NOTE — PROGRESS NOTE
Provider Note


Provider Note: 





RN informed me of subcutaneous emphysema to bilateral neck region. I reviewed 

the chest x-ray from this morning post-CVC insertion which demonstrates 

bilateral SQ emphysema. However, I recall scouting bilateral neck internal 

jugular veins this morning prior to Dr Conner inserting a subclavian CVC for 

which I palpated a tiny amount of SQ emphysema in the right medial neck region 

for which I assumed was from previous CVC attempts/complications. It was 

reported that there was previously a right neck hematoma from arterial puncture 

which was recognized immediately with pressure and hemostasis. I was unable to 

successfully identify the right internal jugular vein on ultrasound this 

morning, but did see the right common carotid artery which appeared to be free 

of thrombus and was pulsating appropriately. No neck hematoma was 

visualized/palpated with the naked eye/hand. The left neck has miscoloration 

with hyperpigmentated area and a few ruptured blisters with no evidence of 

injury to the left IJ or common carotid. It was decided to avoid line placement 

in this region given the skin issues and wanting to avoid a potential source of 

line infection. This hyperpigmented area is though to be from a vesicant 

extravasation and does not appear necrotic or infected at this time. I suspect 

the SQ emphysema is secondary to atelectrauma vs volutrauma and am therefore 

decreasing the Peep from 10->8, Vt from 500->400, and increasing the RR from 15-

>20. The patient has spontaneous respirations above the set rate at this time. 

Given her morbid obesity, I will not decrease the Vt to 6 mL/kg at this time as 

that is a Vt of 300 and I worry about hypoventilation/atelectasis, hence 

selecting 400 mL Vt at this time. Will continue to monitor closely and obtain 

ABG in 1.5 hrs to evaluate pH as well as ventilation. Will increase FiO2 as 

necessary to avoid further ventilator-induced lung injury. 








Update: 


Throughout the overnight hours, the patient expectantly required an increase in 

FiO2 given the above changes though the peak airway pressures remained okay. 

Eventually she required 100% FiO2 and was still hypoxic despite lavage/suction. 

Unfortunately, I had to increase her Peep back to 10 to resolve the hypoxia, 

which worked but did take some time. A CXR was repeated overnight with no 

increase in SQ emphysema or obvious pneumothorax, though anterior or posterior 

pneumo cannot be excluded; there was no deep sulcus or lateral signs of pneumo. 

She will need to be observed closely in light of this information. Discussed 

with Dr Conner.

## 2020-04-18 NOTE — PROGRESS NOTE
Provider Note


Provider Note: 


Patient continued to decompensate from a respiratory standpoint. O2 saturations 

dropped into the low 70s. No change in HR or BP. However maneuvers to rectify 

this were not effective including changing the mode of ventilation. Pt then 

proned with some success. Proned < 1 hour and ZO2 saturations at 86%. CC time 35

minutes.

## 2020-04-18 NOTE — PDOC CRITICAL CARE PROG REPORT
General


Date:: 04/18/20


ICU Day:: 8


Ventilator Day:: 8


Hospital Day:: 13


Resuscitation Status: Full Code


Medical Power of : Maryuri Ledesma


Events in the past 12 to 24 Hours:: 





Ventalator support increased somewhat


Review of systems relevant to events:: 





Respiratory.


Reason for ICU Addmission:: Acute hypoxic respiratory failure with SARS, 2-

CoViD19. Intubated.





- Medications:


Medications reviewed and adjusted accordingly: Yes


Vasopressors:: 





Levophed.


Sedation:: 





Versed.





Physical Exam


Vital Signs: 


                                        











Temp Pulse Resp BP Pulse Ox


 


 96.6 F L  114 H  26 H  97/60 L  89 L


 


 04/18/20 09:30  04/18/20 01:00  04/18/20 09:15  04/18/20 01:00  04/18/20 09:30








                                 Intake & Output











 04/17/20 04/18/20 04/19/20





 06:59 06:59 06:59


 


Intake Total 4419 4493 123


 


Output Total 1887 759 100


 


Balance 2532 3734 23


 


Weight 98.6 kg 100.3 kg 








                                  Weight/Height





Weight                           100.3 kg


Height                           5 ft 2 in








General appearance: PRESENT: no acute distress


Head exam: PRESENT: atraumatic, normocephalic


Eye exam: PRESENT: conjunctiva pink, EOMI, PERRLA.  ABSENT: scleral icterus


Ear exam: PRESENT: normal external ear exam


Mouth exam: PRESENT: moist, tongue midline


Respiratory exam: PRESENT: symmetrical, tachypnea, unlabored


Cardiovascular exam: PRESENT: RRR, tachycardia.  ABSENT: diastolic murmur, rubs,

systolic murmur


GI/Abdominal exam: PRESENT: normal bowel sounds, soft.  ABSENT: distended, 

guarding, mass, organolmegaly, rebound, tenderness


Rectal exam: PRESENT: deferred


Gentrourinary exam: PRESENT: indwelling catheter


Extremities exam: PRESENT: full ROM.  ABSENT: calf tenderness, clubbing, pedal 

edema


Neurological exam: PRESENT: motor sensory deficit, other - Sedated.


Skin exam: PRESENT: dry, intact, warm.  ABSENT: cyanosis, rash


Tubes/Lines: PRESENT: Endotracheal Tube, Central Line, Arterial Catheter, 

Nasogastic Tube





Laboratory/Radiographs


Laboratory Results: 


                                        





                                 04/18/20 05:50 





                                 04/18/20 05:50 





                                        











  04/17/20 04/18/20 04/18/20





  22:35 05:50 05:50


 


WBC   


 


RBC   


 


Hgb   


 


Hct   


 


MCV   


 


MCH   


 


MCHC   


 


RDW   


 


Plt Count   


 


Seg Neutrophils %   


 


Carbonic Acid  1.32  1.36 H 


 


HCO3/H2CO3 Ratio  20:1  20:1 


 


ABG pH  7.42  7.40 


 


ABG pCO2  43.8  45.3 H 


 


ABG pO2  39.2 L*  52.4 L 


 


ABG HCO3  27.6 H  27.7 H 


 


ABG O2 Saturation  74.6 L  87.0 L 


 


ABG Base Excess  2.7  2.6 


 


FiO2  70%  100% 


 


Sodium    143.6


 


Potassium    3.6


 


Chloride    108 H


 


Carbon Dioxide    29


 


Anion Gap    7


 


BUN    32 H


 


Creatinine    0.91


 


Est GFR ( Amer)    > 60


 


Glucose    127 H


 


Calcium    7.8 L


 


Phosphorus    3.7














  04/18/20





  05:50


 


WBC  13.6 H


 


RBC  3.08 L


 


Hgb  8.7 L


 


Hct  25.8 L


 


MCV  84


 


MCH  28.3


 


MCHC  33.8


 


RDW  15.2 H


 


Plt Count  216


 


Seg Neutrophils %  Not Reportable


 


Carbonic Acid 


 


HCO3/H2CO3 Ratio 


 


ABG pH 


 


ABG pCO2 


 


ABG pO2 


 


ABG HCO3 


 


ABG O2 Saturation 


 


ABG Base Excess 


 


FiO2 


 


Sodium 


 


Potassium 


 


Chloride 


 


Carbon Dioxide 


 


Anion Gap 


 


BUN 


 


Creatinine 


 


Est GFR (African Amer) 


 


Glucose 


 


Calcium 


 


Phosphorus 








                                        











  04/05/20 04/10/20 04/10/20





  14:00 11:48 11:48


 


Creatine Kinase   105 


 


CK-MB (CK-2)    0.87


 


Troponin I    < 0.012


 


NT-Pro-B Natriuret Pep  32  














  04/10/20 04/10/20 04/11/20





  22:45 22:45 04:45


 


Creatine Kinase  60   46


 


CK-MB (CK-2)   0.41 


 


Troponin I   0.018 


 


NT-Pro-B Natriuret Pep   














  04/11/20





  04:45


 


Creatine Kinase 


 


CK-MB (CK-2)  0.47


 


Troponin I  < 0.012


 


NT-Pro-B Natriuret Pep 











Impressions: 


                                        





Chest/Abdomen CTA  04/05/20 16:08


IMPRESSION:


1. Patchy peripheral and perihilar ground-glass opacities in both lungs.  These 

are commonly reported imaging features of COVID-19   pneumonia are present. 

Other processes such as influenza pneumonia and organizing pneumonia, as can be 

seen with drug toxicity and connective tissue disease, can cause a similar 

imaging pattern. PneTyp


2. Limited evaluation of the pulmonary arteries due to contrast bolus timing.  

No large central pulmonary embolus.  Evaluation of the segmental and 

subsegmental pulmonary arteries is limited.


 








Chest X-Ray  04/17/20 00:00


IMPRESSION:


 


Little interval change


 


 


copyright 2011 [employer]- All Rights Reserved


 











All labs, radiographs, diagnostic studies and EKGs were personally reviewed: Yes


In addition, reports of radiographic and diagnostic studies were read: Yes





Assessment and Plan





- Diagnosis


(1) COVID-19 virus infection


Is this a current diagnosis for this admission?: Yes   


Plan: 


She has not made progress on ventilator in several days. Will continue treatment

but prognosis is looking less favorable as time goes on.








(2) Acute hypoxemic respiratory failure


Is this a current diagnosis for this admission?: Yes   


Plan: 


She is now on 100% FiO2 and PEEP of 10. Maximal support. Not proned yet.








(3) T2DM (type 2 diabetes mellitus)


Qualifiers: 


   Diabetes mellitus long term insulin use: without long term use   Diabetes 

mellitus complication status: without complication   Qualified Code(s): E11.9 - 

Type 2 diabetes mellitus without complications   


Is this a current diagnosis for this admission?: Yes   


Plan: 


Controlled.





Plan Summary: 





We seem to have gotten her agitation under better control. Continue same 

treatment.





Critical Time


Critical Time (minutes): 40


Level of Care: ICU


Anticipated discharge: SNF


Within: Other


-: 


1.  The care of a critical patient is a dynamic process.  This note is a 

representative synopsis but static in nature.  The timeframe for treatments 

given in order is not necessarily the actual time these treatments may have been

done.





2.  This patient requires critical care secondary to ongoing requirements for 

therapy not offered or safe outside the critical care environment.  Transfer to 

a lower level of care will result in altered life or limb morbidity and 

mortality.





3.  Multidisciplinary rounds completed.





4.  ABCDE bundle addressed.

## 2020-04-18 NOTE — RADIOLOGY REPORT (SQ)
EXAM DESCRIPTION: 



XR CHEST 1 VIEW



COMPLETED DATE/TME:  04/18/2020 00:00



CLINICAL HISTORY: 



70 years, Female, resp failure; f/u SQ emphysema   R/O

pneumothorax



COMPARISON:

4/17/2020 chest x-ray at 9:18 AM



NUMBER OF VIEWS:

1



TECHNIQUE:

Portable chest



LIMITATIONS:

None.



FINDINGS:



The heart size is stable. Grossly stable indwelling

tubes/lines/catheters. Subcutaneous emphysema is redemonstrated

as is pneumomediastinum along the left heart border. No discrete

pneumothorax. Mixed interstitial and airspace opacities

bilaterally, as before



IMPRESSION:



Little interval change

 



copyright 2011 [employer]- All Rights Reserved

## 2020-04-19 LAB
ABSOLUTE LYMPHOCYTES# (MANUAL): 0.5 10^3/UL (ref 0.5–4.7)
ABSOLUTE MONOCYTES # (MANUAL): 0.8 10^3/UL (ref 0.1–1.4)
ADD MANUAL DIFF: YES
ANION GAP SERPL CALC-SCNC: 4 MMOL/L (ref 5–19)
ANISOCYTOSIS BLD QL SMEAR: SLIGHT
ARTERIAL BLOOD FIO2: (no result)
ARTERIAL BLOOD FIO2: (no result)
ARTERIAL BLOOD H2CO3: 1.67 MMOL/L (ref 1.05–1.35)
ARTERIAL BLOOD H2CO3: 2.17 MMOL/L (ref 1.05–1.35)
ARTERIAL BLOOD HCO3: 26.8 MMOL/L (ref 20–24)
ARTERIAL BLOOD HCO3: 29.3 MMOL/L (ref 20–24)
ARTERIAL BLOOD PCO2: 55.5 MMHG (ref 35–45)
ARTERIAL BLOOD PCO2: 72 MMHG (ref 35–45)
ARTERIAL BLOOD PH: 7.23 (ref 7.35–7.45)
ARTERIAL BLOOD PH: 7.3 (ref 7.35–7.45)
ARTERIAL BLOOD PO2: 69.7 MMHG (ref 80–100)
ARTERIAL BLOOD PO2: 75.3 MMHG (ref 80–100)
ARTERIAL BLOOD TOTAL CO2: 28.5 MMOL/L (ref 21–25)
ARTERIAL BLOOD TOTAL CO2: 31.6 MMOL/L (ref 21–25)
BASE EXCESS BLDA CALC-SCNC: 0 MMOL/L
BASE EXCESS BLDA CALC-SCNC: 0.9 MMOL/L
BASOPHILS NFR BLD MANUAL: 0 % (ref 0–2)
BUN SERPL-MCNC: 37 MG/DL (ref 7–20)
CALCIUM: 7.6 MG/DL (ref 8.4–10.2)
CHLORIDE SERPL-SCNC: 108 MMOL/L (ref 98–107)
CO2 SERPL-SCNC: 32 MMOL/L (ref 22–30)
EOSINOPHIL NFR BLD MANUAL: 0 % (ref 0–6)
ERYTHROCYTE [DISTWIDTH] IN BLOOD BY AUTOMATED COUNT: 15.7 % (ref 11.5–14)
GLUCOSE SERPL-MCNC: 153 MG/DL (ref 75–110)
HCT VFR BLD CALC: 25.8 % (ref 36–47)
HGB BLD-MCNC: 8.5 G/DL (ref 12–15.5)
MCH RBC QN AUTO: 28.3 PG (ref 27–33.4)
MCHC RBC AUTO-ENTMCNC: 33.1 G/DL (ref 32–36)
MCV RBC AUTO: 86 FL (ref 80–97)
MONOCYTES % (MANUAL): 5 % (ref 3–13)
NEUTS BAND NFR BLD MANUAL: 2 % (ref 3–5)
NRBC BLD AUTO-RTO: 1 /100 WBC
PLATELET # BLD: 144 10^3/UL (ref 150–450)
PLATELET COMMENT: (no result)
POTASSIUM SERPL-SCNC: 4.7 MMOL/L (ref 3.6–5)
RBC # BLD AUTO: 3.02 10^6/UL (ref 3.72–5.28)
SAO2 % BLDA: 89.9 % (ref 94–98)
SAO2 % BLDA: 93.5 % (ref 94–98)
SEGMENTED NEUTROPHILS % (MAN): 90 % (ref 42–78)
TOTAL CELLS COUNTED BLD: 100
TRIGL SERPL-MCNC: 79 MG/DL (ref ?–150)
VARIANT LYMPHS NFR BLD MANUAL: 3 % (ref 13–45)
WBC # BLD AUTO: 15.1 10^3/UL (ref 4–10.5)

## 2020-04-19 RX ADMIN — MIDAZOLAM HYDROCHLORIDE PRN MLS/HR: 5 INJECTION, SOLUTION INTRAMUSCULAR; INTRAVENOUS at 18:39

## 2020-04-19 RX ADMIN — INSULIN HUMAN SCH UNIT: 100 INJECTION, SOLUTION PARENTERAL at 11:17

## 2020-04-19 RX ADMIN — MIDAZOLAM HYDROCHLORIDE PRN MLS/HR: 5 INJECTION, SOLUTION INTRAMUSCULAR; INTRAVENOUS at 23:42

## 2020-04-19 RX ADMIN — MIDAZOLAM HYDROCHLORIDE PRN MLS/HR: 5 INJECTION, SOLUTION INTRAMUSCULAR; INTRAVENOUS at 03:31

## 2020-04-19 RX ADMIN — ENOXAPARIN SODIUM SCH MG: 40 INJECTION SUBCUTANEOUS at 11:15

## 2020-04-19 RX ADMIN — HYDROCORTISONE SODIUM SUCCINATE SCH MG: 100 INJECTION, POWDER, FOR SOLUTION INTRAMUSCULAR; INTRAVENOUS at 14:28

## 2020-04-19 RX ADMIN — METHOCARBAMOL SCH MG: 500 TABLET ORAL at 14:28

## 2020-04-19 RX ADMIN — HYDROCODONE BITARTRATE AND ACETAMINOPHEN SCH ML: 7.5; 325 SOLUTION ORAL at 02:38

## 2020-04-19 RX ADMIN — MIDAZOLAM HYDROCHLORIDE PRN MLS/HR: 5 INJECTION, SOLUTION INTRAMUSCULAR; INTRAVENOUS at 15:22

## 2020-04-19 RX ADMIN — Medication SCH: at 14:28

## 2020-04-19 RX ADMIN — ROCURONIUM BROMIDE PRN MLS/HR: 10 INJECTION, SOLUTION INTRAVENOUS at 11:19

## 2020-04-19 RX ADMIN — METHOCARBAMOL SCH MG: 500 TABLET ORAL at 06:30

## 2020-04-19 RX ADMIN — HYDROCORTISONE SODIUM SUCCINATE SCH MG: 100 INJECTION, POWDER, FOR SOLUTION INTRAMUSCULAR; INTRAVENOUS at 21:39

## 2020-04-19 RX ADMIN — NOREPINEPHRINE BITARTRATE PRN MLS/HR: 1 INJECTION, SOLUTION, CONCENTRATE INTRAVENOUS at 11:18

## 2020-04-19 RX ADMIN — HYDROCODONE BITARTRATE AND ACETAMINOPHEN SCH ML: 7.5; 325 SOLUTION ORAL at 06:00

## 2020-04-19 RX ADMIN — Medication SCH: at 21:39

## 2020-04-19 RX ADMIN — HYDROCODONE BITARTRATE AND ACETAMINOPHEN SCH ML: 7.5; 325 SOLUTION ORAL at 21:36

## 2020-04-19 RX ADMIN — HYDROCODONE BITARTRATE AND ACETAMINOPHEN SCH ML: 7.5; 325 SOLUTION ORAL at 11:16

## 2020-04-19 RX ADMIN — METHOCARBAMOL SCH MG: 500 TABLET ORAL at 21:38

## 2020-04-19 RX ADMIN — Medication SCH MG: at 21:38

## 2020-04-19 RX ADMIN — Medication SCH ML: at 06:30

## 2020-04-19 RX ADMIN — HYDROCORTISONE SODIUM SUCCINATE SCH MG: 100 INJECTION, POWDER, FOR SOLUTION INTRAMUSCULAR; INTRAVENOUS at 06:30

## 2020-04-19 RX ADMIN — MINERAL OIL AND WHITE PETROLATUM SCH APPLIC: 150; 830 OINTMENT OPHTHALMIC at 06:29

## 2020-04-19 RX ADMIN — MIDAZOLAM HYDROCHLORIDE PRN MLS/HR: 5 INJECTION, SOLUTION INTRAMUSCULAR; INTRAVENOUS at 11:18

## 2020-04-19 RX ADMIN — NOREPINEPHRINE BITARTRATE PRN MLS/HR: 1 INJECTION, SOLUTION, CONCENTRATE INTRAVENOUS at 21:39

## 2020-04-19 RX ADMIN — ALBUMIN (HUMAN) SCH MLS/HR: 12.5 SOLUTION INTRAVENOUS at 21:29

## 2020-04-19 RX ADMIN — INSULIN HUMAN SCH UNIT: 100 INJECTION, SOLUTION PARENTERAL at 02:37

## 2020-04-19 RX ADMIN — FAMOTIDINE SCH MG: 20 TABLET, FILM COATED ORAL at 21:38

## 2020-04-19 RX ADMIN — OXYCODONE HYDROCHLORIDE AND ACETAMINOPHEN SCH MG: 500 TABLET ORAL at 18:12

## 2020-04-19 RX ADMIN — HYDROCODONE BITARTRATE AND ACETAMINOPHEN SCH ML: 7.5; 325 SOLUTION ORAL at 18:12

## 2020-04-19 RX ADMIN — Medication SCH MG: at 09:06

## 2020-04-19 RX ADMIN — ALBUMIN (HUMAN) SCH MLS/HR: 12.5 SOLUTION INTRAVENOUS at 16:49

## 2020-04-19 RX ADMIN — MINERAL OIL AND WHITE PETROLATUM SCH APPLIC: 150; 830 OINTMENT OPHTHALMIC at 21:37

## 2020-04-19 RX ADMIN — INSULIN HUMAN SCH: 100 INJECTION, SOLUTION PARENTERAL at 06:17

## 2020-04-19 RX ADMIN — HYDROCODONE BITARTRATE AND ACETAMINOPHEN SCH ML: 7.5; 325 SOLUTION ORAL at 14:28

## 2020-04-19 RX ADMIN — HYDROMORPHONE HYDROCHLORIDE PRN MG: 2 INJECTION INTRAMUSCULAR; INTRAVENOUS; SUBCUTANEOUS at 01:30

## 2020-04-19 RX ADMIN — INSULIN HUMAN SCH UNIT: 100 INJECTION, SOLUTION PARENTERAL at 18:22

## 2020-04-19 RX ADMIN — INSULIN HUMAN SCH UNIT: 100 INJECTION, SOLUTION PARENTERAL at 15:09

## 2020-04-19 RX ADMIN — VITAMIN D, TAB 1000IU (100/BT) SCH UNIT: 25 TAB at 09:05

## 2020-04-19 RX ADMIN — MIDAZOLAM HYDROCHLORIDE PRN MLS/HR: 5 INJECTION, SOLUTION INTRAMUSCULAR; INTRAVENOUS at 06:02

## 2020-04-19 RX ADMIN — ALBUMIN (HUMAN) SCH: 12.5 SOLUTION INTRAVENOUS at 16:11

## 2020-04-19 RX ADMIN — MIDAZOLAM HYDROCHLORIDE PRN MLS/HR: 5 INJECTION, SOLUTION INTRAMUSCULAR; INTRAVENOUS at 09:04

## 2020-04-19 RX ADMIN — MIDAZOLAM HYDROCHLORIDE PRN MLS/HR: 5 INJECTION, SOLUTION INTRAMUSCULAR; INTRAVENOUS at 01:00

## 2020-04-19 RX ADMIN — SODIUM CHLORIDE, SODIUM LACTATE, POTASSIUM CHLORIDE, AND CALCIUM CHLORIDE PRN MLS/HR: .6; .31; .03; .02 INJECTION, SOLUTION INTRAVENOUS at 08:23

## 2020-04-19 RX ADMIN — MINERAL OIL AND WHITE PETROLATUM SCH APPLIC: 150; 830 OINTMENT OPHTHALMIC at 14:29

## 2020-04-19 RX ADMIN — FAMOTIDINE SCH MG: 20 TABLET, FILM COATED ORAL at 09:06

## 2020-04-19 RX ADMIN — ROCURONIUM BROMIDE PRN MLS/HR: 10 INJECTION, SOLUTION INTRAVENOUS at 06:12

## 2020-04-19 RX ADMIN — ALBUMIN (HUMAN) SCH MLS/HR: 12.5 SOLUTION INTRAVENOUS at 06:29

## 2020-04-19 RX ADMIN — OXYCODONE HYDROCHLORIDE AND ACETAMINOPHEN SCH MG: 500 TABLET ORAL at 09:05

## 2020-04-19 RX ADMIN — INSULIN HUMAN SCH UNIT: 100 INJECTION, SOLUTION PARENTERAL at 21:34

## 2020-04-19 NOTE — PDOC CRITICAL CARE PROG REPORT
General


Date:: 04/19/20


ICU Day:: 9


Ventilator Day:: 9


Hospital Day:: 14


Resuscitation Status: Full Code


Medical Power of : Maryuri Ledesma


Events in the past 12 to 24 Hours:: 





Proned for vent support.


Review of systems relevant to events:: 





Respiratory.


Reason for ICU Addmission:: Acute hypoxic respiratory failure with SARS, 

2-CoViD19. Intubated. Proned.





- Medications:


Medications reviewed and adjusted accordingly: Yes


Vasopressors:: 





Levophed.


Sedation:: 





Dilaudid, Versed.





Physical Exam


Vital Signs: 


                                        











Temp Pulse Resp BP Pulse Ox


 


 96.1 F L  103 H  30 H  123/85   91 L


 


 04/19/20 10:03  04/18/20 20:00  04/19/20 10:03  04/19/20 10:03  04/19/20 11:15








                                 Intake & Output











 04/18/20 04/19/20 04/20/20





 06:59 06:59 06:59


 


Intake Total 4493 3126 652


 


Output Total 759 827 160


 


Balance 3734 2299 492


 


Weight 100.3 kg 105.6 kg 








                                  Weight/Height





Weight                           105.6 kg


Height                           5 ft 2 in








General appearance: PRESENT: no acute distress, morbidly obese


Head exam: PRESENT: atraumatic, normocephalic


Eye exam: PRESENT: conjunctiva pink, EOMI, PERRLA.  ABSENT: scleral icterus


Ear exam: PRESENT: normal external ear exam


Mouth exam: PRESENT: moist, tongue midline


Respiratory exam: PRESENT: symmetrical, tachypnea, unlabored


Cardiovascular exam: PRESENT: RRR.  ABSENT: diastolic murmur, rubs, systolic 

murmur


GI/Abdominal exam: PRESENT: normal bowel sounds, soft.  ABSENT: distended, 

guarding, mass, organolmegaly, rebound, tenderness


Rectal exam: PRESENT: deferred


Gentrourinary exam: PRESENT: indwelling catheter


Extremities exam: PRESENT: full ROM.  ABSENT: calf tenderness, clubbing, pedal 

edema


Musculoskeletal exam: PRESENT: normal inspection


Neurological exam: PRESENT: other - Sedated with neuromuscular blockers.


Skin exam: PRESENT: other - Small st 1 decubitus on sacrum


Tubes/Lines: PRESENT: Endotracheal Tube, Central Line, Arterial Catheter, 

Nasogastic Tube





Laboratory/Radiographs


Laboratory Results: 


                                        





                                 04/19/20 07:30 





                                 04/19/20 07:30 





                                        











  04/18/20 04/19/20 04/19/20





  21:50 07:14 07:30


 


WBC   


 


RBC   


 


Hgb   


 


Hct   


 


MCV   


 


MCH   


 


MCHC   


 


RDW   


 


Plt Count   


 


Seg Neutrophils %   


 


Carbonic Acid  1.30  2.17 H 


 


HCO3/H2CO3 Ratio  20:1  13:1 


 


ABG pH  7.41  7.23 L 


 


ABG pCO2  43.1  72.0 H* 


 


ABG pO2  52.6 L  69.7 L 


 


ABG HCO3  26.5 H  29.3 H 


 


ABG O2 Saturation  87.4 L  89.9 L 


 


ABG Base Excess  1.6  0.9 


 


FiO2  100%  100% 


 


Sodium    143.5


 


Potassium    4.7


 


Chloride    108 H


 


Carbon Dioxide    32 H


 


Anion Gap    4 L


 


BUN    37 H


 


Creatinine    0.98


 


Est GFR ( Amer)    > 60


 


Glucose    153 H


 


Calcium    7.6 L


 


Triglycerides    79














  04/19/20





  07:30


 


WBC  15.1 H


 


RBC  3.02 L


 


Hgb  8.5 L


 


Hct  25.8 L


 


MCV  86


 


MCH  28.3


 


MCHC  33.1


 


RDW  15.7 H


 


Plt Count  144 L


 


Seg Neutrophils %  Not Reportable


 


Carbonic Acid 


 


HCO3/H2CO3 Ratio 


 


ABG pH 


 


ABG pCO2 


 


ABG pO2 


 


ABG HCO3 


 


ABG O2 Saturation 


 


ABG Base Excess 


 


FiO2 


 


Sodium 


 


Potassium 


 


Chloride 


 


Carbon Dioxide 


 


Anion Gap 


 


BUN 


 


Creatinine 


 


Est GFR (African Amer) 


 


Glucose 


 


Calcium 


 


Triglycerides 








                                        





04/15/20 11:12   Stool - Stool    - Final


04/15/20 11:12   Stool - Stool   Stool Culture - Final


                            C.albicans/C.dubliniensis





                                        











  04/05/20 04/10/20 04/10/20





  14:00 11:48 11:48


 


Creatine Kinase   105 


 


CK-MB (CK-2)    0.87


 


Troponin I    < 0.012


 


NT-Pro-B Natriuret Pep  32  














  04/10/20 04/10/20 04/11/20





  22:45 22:45 04:45


 


Creatine Kinase  60   46


 


CK-MB (CK-2)   0.41 


 


Troponin I   0.018 


 


NT-Pro-B Natriuret Pep   














  04/11/20





  04:45


 


Creatine Kinase 


 


CK-MB (CK-2)  0.47


 


Troponin I  < 0.012


 


NT-Pro-B Natriuret Pep 











Impressions: 


                                        





Chest/Abdomen CTA  04/05/20 16:08


IMPRESSION:


1. Patchy peripheral and perihilar ground-glass opacities in both lungs.  These 

are commonly reported imaging features of COVID-19   pneumonia are present. 

Other processes such as influenza pneumonia and organizing pneumonia, as can be 

seen with drug toxicity and connective tissue disease, can cause a similar 

imaging pattern. PneTyp


2. Limited evaluation of the pulmonary arteries due to contrast bolus timing.  

No large central pulmonary embolus.  Evaluation of the segmental and 

subsegmental pulmonary arteries is limited.


 








Chest X-Ray  04/17/20 00:00


IMPRESSION:


 


Little interval change


 


 


copyright 2011 [employer]- All Rights Reserved


 











All labs, radiographs, diagnostic studies and EKGs were personally reviewed: Yes


In addition, reports of radiographic and diagnostic studies were read: Yes





Assessment and Plan





- Diagnosis


(1) COVID-19 virus infection


Is this a current diagnosis for this admission?: Yes   


Plan: 


She is acting as a covid patient who is refractory and likely will not survive 

despite maximal therapy. I have asked her daghter to gather her family. They are

from [place] and should be here in a day.








(2) Acute hypoxemic respiratory failure


Is this a current diagnosis for this admission?: Yes   


Plan: 


This is refractory. She has been proned nearly 20 hours. Her oxygen saturation 

is 90-91 % on 100% FiO2 and 12 PEEP. She has had hydroxychloroquine. At this 

point we can only watch and wait but I feel she will likely die.








(3) T2DM (type 2 diabetes mellitus)


Qualifiers: 


   Diabetes mellitus long term insulin use: without long term use   Diabetes me

llitus complication status: without complication   Qualified Code(s): E11.9 - 

Type 2 diabetes mellitus without complications   


Is this a current diagnosis for this admission?: Yes   


Plan: 


Controlled.





Plan Summary: 





Keep proned a bit longer given her oxygen status even though she is beyond the 

recommended time in the prone position.





Critical Time


Critical Time (minutes): 40


Level of Care: ICU


Anticipated discharge: Other


Within: Other


-: 


1.  The care of a critical patient is a dynamic process.  This note is a 

representative synopsis but static in nature.  The timeframe for treatments 

given in order is not necessarily the actual time these treatments may have been

done.





2.  This patient requires critical care secondary to ongoing requirements for 

therapy not offered or safe outside the critical care environment.  Transfer to 

a lower level of care will result in altered life or limb morbidity and 

mortality.





3.  Multidisciplinary rounds completed.





4.  ABCDE bundle addressed.

## 2020-04-20 LAB
ABSOLUTE LYMPHOCYTES# (MANUAL): 0.6 10^3/UL (ref 0.5–4.7)
ABSOLUTE MONOCYTES # (MANUAL): 0.4 10^3/UL (ref 0.1–1.4)
ADD MANUAL DIFF: YES
ANION GAP SERPL CALC-SCNC: 7 MMOL/L (ref 5–19)
ANISOCYTOSIS BLD QL SMEAR: (no result)
ARTERIAL BLOOD FIO2: 15
ARTERIAL BLOOD H2CO3: 2 MMOL/L (ref 1.05–1.35)
ARTERIAL BLOOD HCO3: 25.8 MMOL/L (ref 20–24)
ARTERIAL BLOOD PCO2: 66.4 MMHG (ref 35–45)
ARTERIAL BLOOD PH: 7.21 (ref 7.35–7.45)
ARTERIAL BLOOD PO2: 75.5 MMHG (ref 80–100)
ARTERIAL BLOOD TOTAL CO2: 27.9 MMOL/L (ref 21–25)
BASE EXCESS BLDA CALC-SCNC: -2.5 MMOL/L
BASOPHILS NFR BLD MANUAL: 0 % (ref 0–2)
BUN SERPL-MCNC: 40 MG/DL (ref 7–20)
CALCIUM: 7.9 MG/DL (ref 8.4–10.2)
CHLORIDE SERPL-SCNC: 108 MMOL/L (ref 98–107)
CO2 SERPL-SCNC: 28 MMOL/L (ref 22–30)
EOSINOPHIL NFR BLD MANUAL: 0 % (ref 0–6)
ERYTHROCYTE [DISTWIDTH] IN BLOOD BY AUTOMATED COUNT: 15.6 % (ref 11.5–14)
GLUCOSE SERPL-MCNC: 202 MG/DL (ref 75–110)
HCT VFR BLD CALC: 22.5 % (ref 36–47)
HGB BLD-MCNC: 7.6 G/DL (ref 12–15.5)
MCH RBC QN AUTO: 28.8 PG (ref 27–33.4)
MCHC RBC AUTO-ENTMCNC: 33.5 G/DL (ref 32–36)
MCV RBC AUTO: 86 FL (ref 80–97)
MONOCYTES % (MANUAL): 5 % (ref 3–13)
NEUTS BAND NFR BLD MANUAL: 1 % (ref 3–5)
PLATELET # BLD: 80 10^3/UL (ref 150–450)
PLATELET COMMENT: (no result)
POLYCHROMASIA BLD QL SMEAR: (no result)
POTASSIUM SERPL-SCNC: 4.7 MMOL/L (ref 3.6–5)
RBC # BLD AUTO: 2.62 10^6/UL (ref 3.72–5.28)
SAO2 % BLDA: 91.6 % (ref 94–98)
SEGMENTED NEUTROPHILS % (MAN): 86 % (ref 42–78)
TOTAL CELLS COUNTED BLD: 100
VARIANT LYMPHS NFR BLD MANUAL: 8 % (ref 13–45)
WBC # BLD AUTO: 7.7 10^3/UL (ref 4–10.5)

## 2020-04-20 RX ADMIN — MINERAL OIL AND WHITE PETROLATUM SCH APPLIC: 150; 830 OINTMENT OPHTHALMIC at 14:36

## 2020-04-20 RX ADMIN — METHOCARBAMOL SCH MG: 500 TABLET ORAL at 14:45

## 2020-04-20 RX ADMIN — INSULIN HUMAN SCH UNIT: 100 INJECTION, SOLUTION PARENTERAL at 05:11

## 2020-04-20 RX ADMIN — HYDROCODONE BITARTRATE AND ACETAMINOPHEN SCH ML: 7.5; 325 SOLUTION ORAL at 05:32

## 2020-04-20 RX ADMIN — ALBUMIN (HUMAN) SCH MLS/HR: 12.5 SOLUTION INTRAVENOUS at 14:35

## 2020-04-20 RX ADMIN — MIDAZOLAM HYDROCHLORIDE PRN MLS/HR: 5 INJECTION, SOLUTION INTRAMUSCULAR; INTRAVENOUS at 10:59

## 2020-04-20 RX ADMIN — OXYCODONE HYDROCHLORIDE AND ACETAMINOPHEN SCH MG: 500 TABLET ORAL at 18:13

## 2020-04-20 RX ADMIN — MORPHINE SULFATE PRN MLS/HR: 1 INJECTION, SOLUTION INTRAVENOUS at 12:56

## 2020-04-20 RX ADMIN — ROCURONIUM BROMIDE PRN MLS/HR: 10 INJECTION, SOLUTION INTRAVENOUS at 04:43

## 2020-04-20 RX ADMIN — METHOCARBAMOL SCH MG: 500 TABLET ORAL at 14:36

## 2020-04-20 RX ADMIN — HYDROCODONE BITARTRATE AND ACETAMINOPHEN SCH ML: 7.5; 325 SOLUTION ORAL at 01:37

## 2020-04-20 RX ADMIN — HYDROCORTISONE SODIUM SUCCINATE SCH MG: 100 INJECTION, POWDER, FOR SOLUTION INTRAMUSCULAR; INTRAVENOUS at 21:37

## 2020-04-20 RX ADMIN — SODIUM CHLORIDE, SODIUM LACTATE, POTASSIUM CHLORIDE, AND CALCIUM CHLORIDE PRN MLS/HR: .6; .31; .03; .02 INJECTION, SOLUTION INTRAVENOUS at 20:31

## 2020-04-20 RX ADMIN — Medication SCH MG: at 10:48

## 2020-04-20 RX ADMIN — SODIUM CHLORIDE, SODIUM LACTATE, POTASSIUM CHLORIDE, AND CALCIUM CHLORIDE PRN MLS/HR: .6; .31; .03; .02 INJECTION, SOLUTION INTRAVENOUS at 02:38

## 2020-04-20 RX ADMIN — MIDAZOLAM HYDROCHLORIDE PRN MLS/HR: 5 INJECTION, SOLUTION INTRAMUSCULAR; INTRAVENOUS at 06:22

## 2020-04-20 RX ADMIN — ROCURONIUM BROMIDE PRN MLS/HR: 10 INJECTION, SOLUTION INTRAVENOUS at 22:22

## 2020-04-20 RX ADMIN — MIDAZOLAM HYDROCHLORIDE PRN MLS/HR: 5 INJECTION, SOLUTION INTRAMUSCULAR; INTRAVENOUS at 09:06

## 2020-04-20 RX ADMIN — HYDROCORTISONE SODIUM SUCCINATE SCH MG: 100 INJECTION, POWDER, FOR SOLUTION INTRAMUSCULAR; INTRAVENOUS at 14:37

## 2020-04-20 RX ADMIN — Medication SCH: at 05:13

## 2020-04-20 RX ADMIN — ENOXAPARIN SODIUM SCH MG: 40 INJECTION SUBCUTANEOUS at 11:20

## 2020-04-20 RX ADMIN — MINERAL OIL AND WHITE PETROLATUM SCH APPLIC: 150; 830 OINTMENT OPHTHALMIC at 22:07

## 2020-04-20 RX ADMIN — INSULIN HUMAN SCH UNIT: 100 INJECTION, SOLUTION PARENTERAL at 10:47

## 2020-04-20 RX ADMIN — MINERAL OIL AND WHITE PETROLATUM SCH APPLIC: 150; 830 OINTMENT OPHTHALMIC at 05:12

## 2020-04-20 RX ADMIN — ALBUMIN (HUMAN) SCH MLS/HR: 12.5 SOLUTION INTRAVENOUS at 05:11

## 2020-04-20 RX ADMIN — Medication SCH: at 14:37

## 2020-04-20 RX ADMIN — OXYCODONE HYDROCHLORIDE AND ACETAMINOPHEN SCH MG: 500 TABLET ORAL at 10:48

## 2020-04-20 RX ADMIN — MIDAZOLAM HYDROCHLORIDE PRN MLS/HR: 5 INJECTION, SOLUTION INTRAMUSCULAR; INTRAVENOUS at 02:42

## 2020-04-20 RX ADMIN — METHOCARBAMOL SCH MG: 500 TABLET ORAL at 21:36

## 2020-04-20 RX ADMIN — HYDROCODONE BITARTRATE AND ACETAMINOPHEN SCH: 7.5; 325 SOLUTION ORAL at 18:13

## 2020-04-20 RX ADMIN — INSULIN HUMAN SCH UNIT: 100 INJECTION, SOLUTION PARENTERAL at 01:37

## 2020-04-20 RX ADMIN — HYDROCODONE BITARTRATE AND ACETAMINOPHEN SCH: 7.5; 325 SOLUTION ORAL at 14:44

## 2020-04-20 RX ADMIN — HYDROMORPHONE HYDROCHLORIDE PRN MG: 2 INJECTION INTRAMUSCULAR; INTRAVENOUS; SUBCUTANEOUS at 09:31

## 2020-04-20 RX ADMIN — ALBUMIN (HUMAN) SCH MLS/HR: 12.5 SOLUTION INTRAVENOUS at 21:38

## 2020-04-20 RX ADMIN — FAMOTIDINE SCH MG: 20 TABLET, FILM COATED ORAL at 21:37

## 2020-04-20 RX ADMIN — Medication SCH: at 22:07

## 2020-04-20 RX ADMIN — MIDAZOLAM HYDROCHLORIDE PRN MLS/HR: 5 INJECTION, SOLUTION INTRAMUSCULAR; INTRAVENOUS at 22:50

## 2020-04-20 RX ADMIN — INSULIN HUMAN SCH UNIT: 100 INJECTION, SOLUTION PARENTERAL at 18:12

## 2020-04-20 RX ADMIN — Medication SCH MG: at 21:36

## 2020-04-20 RX ADMIN — MIDAZOLAM HYDROCHLORIDE PRN MLS/HR: 5 INJECTION, SOLUTION INTRAMUSCULAR; INTRAVENOUS at 15:38

## 2020-04-20 RX ADMIN — MIDAZOLAM HYDROCHLORIDE PRN MLS/HR: 5 INJECTION, SOLUTION INTRAMUSCULAR; INTRAVENOUS at 20:22

## 2020-04-20 RX ADMIN — INSULIN HUMAN SCH UNIT: 100 INJECTION, SOLUTION PARENTERAL at 21:36

## 2020-04-20 RX ADMIN — METHOCARBAMOL SCH: 500 TABLET ORAL at 14:41

## 2020-04-20 RX ADMIN — MORPHINE SULFATE PRN MLS/HR: 1 INJECTION, SOLUTION INTRAVENOUS at 19:47

## 2020-04-20 RX ADMIN — HYDROCODONE BITARTRATE AND ACETAMINOPHEN SCH ML: 7.5; 325 SOLUTION ORAL at 11:19

## 2020-04-20 RX ADMIN — FAMOTIDINE SCH MG: 20 TABLET, FILM COATED ORAL at 10:48

## 2020-04-20 RX ADMIN — HYDROCORTISONE SODIUM SUCCINATE SCH MG: 100 INJECTION, POWDER, FOR SOLUTION INTRAMUSCULAR; INTRAVENOUS at 05:11

## 2020-04-20 RX ADMIN — INSULIN HUMAN SCH UNIT: 100 INJECTION, SOLUTION PARENTERAL at 14:42

## 2020-04-20 RX ADMIN — METHOCARBAMOL SCH MG: 500 TABLET ORAL at 05:11

## 2020-04-20 RX ADMIN — MIDAZOLAM HYDROCHLORIDE PRN MLS/HR: 5 INJECTION, SOLUTION INTRAMUSCULAR; INTRAVENOUS at 18:12

## 2020-04-20 RX ADMIN — MIDAZOLAM HYDROCHLORIDE PRN MLS/HR: 5 INJECTION, SOLUTION INTRAMUSCULAR; INTRAVENOUS at 13:12

## 2020-04-20 RX ADMIN — VITAMIN D, TAB 1000IU (100/BT) SCH UNIT: 25 TAB at 10:48

## 2020-04-20 NOTE — PROGRESS NOTE
Provider Note


Provider Note: 





Date/Time: 4/18/2020 23:30 pm





I attempted to make contact with Mrs Faustin's daughter, Crystal Hannon at 23:30 

pm to update her on her mother's condition given recrudescence which 

unfortunately, is being observed with COVID-19 disease. I left a voicemail for 

her to return my call.





My reason for wanting to speak with Crystal is that Mrs Oakley is beginning to 

experience worsening hypoxia despite 100% FiO2 and Peep of 12 which is currently

thought to do more harm than good in the COVID-19 pandemic. Additionally, the 

patient has experienced atelectrauma in the past 36 hrs and I would like to 

avoid exacerbating that issue if able. Therefore, the decision was made to start

a Rocuronium infusion to eliminate the patient's spontaneous effort for which 

she has a tendency to create some expiratory resistance as a contributing 

factor, despite being on 16-18 mg/hr of Versed and receiving Dilaudid IV pushes.

Duo-neb administration has been prophylactically previously attempted without 

improvement in her hypoxia when she utilizes expiratory accessory muscles. 

Interestingly, Mrs Faustin has been extremely difficult to sedate since arrival 

at [facility], requiring so many different sedative/opioid combinations. 

This pandemic has also led to depleting medications such as Fentanyl infusion 

and I was informed yesterday morning we were out of Dilaudid infusion 

capability. We have been unable to utilize Morphine infusions due to her 

hemodynamic compromise, requiring vasopressor therapy. I ordered a Rocuronium 

bolus dose prior to the infusion, but was informed our Rocuronium supply is 

extremely limited at the moment, for which I would have to utilize vials from 

the remaining RSI kits to give the bolus dose. Therefore, I elected to forgo the

bolus in an effort to reserve this medication for emergency intubations and 

initiate the Rocuronium infusion at a higher dose for more rapid effect. To 

note, we ensured Mrs Faustin was adequately sedated before initiation. Changes I

was simultaneously making to the ventilator to decrease plateau pressure and 

increase SPO2 are as follows:





-changed mode from AC/VC to SIMV/PC


-PC 26 yielding a Vt of 395-400 mL


-PS 26 as patient did not immediately lose spontaneous respiratory effort


-increased set respiratory rate to 30 to achieve a minute ventilation of 12


-the Peep was decreased from 12 to 8 incrementally observing the Pplat


-I shortened the inspiratory time to 0.75 seconds as the patient was having no 

added benefit of the 1 sec that was set on the ventilator, contributing to 

patient discomfort as she exhaled against prolonged pressure flow time.


-I also increased the rise time to 0.3 seconds to decrease the pressure and 

shear force on the alveolar level, which seemed to help.





It is important to note that flow/inspiratory time, rise time, pressure 

control/tidal volume, respiratory rate, and Peep all influence plateau pressure 

and peak inspiratory pressure.





Additional interventions at this time while in was in the room:


Norepinephrine was turned from 2 mcg/min to off at this time, IV fluids were 

turned off as pt is receiving 118 ml/hr in collective fluids with infusions at 

the moment, Versed was increased to 20 mg/hr based on BP increasing 15 mm Hg. 





Vitals leaving the room were  (from 140) /90 from , SPO2 96%,

and patient without expiratory muscle use. Unable to assess for spontaneous 

effort at this time, as I overrode her intrinsic rate anyway during the above 

changes. PiP currently 35 (from 45), Pplat currently 31 (from 40)





Ideally, I would like both PiP and more importantly Pplat to be below 30, this 

is considered a small victory at this time given where the pressures were 

beforehand. Patient remains at high risk of barotrauma to include 

atelectrauma/volutrauma and even higher risk of mortality given her 

recrudescence. 








Critical care time: 50 minutes


Billing code 43836











Follow-up Addendum:


At 5am, the patient was positioned with the head in a neutral position on a face

pillow with cutouts for orbits and the endotracheal tube/mouth from right-sided 

lateral neck rotation. During this time, Mrs Faustin's SPO2 decreased again to 

87% and her Vt decreased to 349 mL on the same ventilator settings as above. 

Unfortunately, I had to increase her pressure control and pressure support to 

30, which in turn increased her PiP from 35 to 39. The alternative is increasing

the Peep further, but I am still hesitant given her recent atelectrauma <36 hrs 

ago. Placing a thoracostomy tube in the setting of COVID-19 is not desirable, 

especially without being in a negative pressure room. Anyhow, her Vt returned to

390-395 mL which is important from a ventilation standpoint. Patient has a 

slightly normal to mildly elevated PaCO2 with a minute ventilation of 

approximately 12 L/min. Will give her a little time to see if her SPO2 increases

and then check an ABG to see where we stand on these settings adjusting the 

ventilator as necessary. Given her major respiratory issue is presently hypoxia,

permissive hypercapnia will be allowed at this time with a goal of normalization

as able.

## 2020-04-20 NOTE — PROGRESS NOTE
Provider Note


Provider Note: 





I spoke to Crystal Hannon before midnight, patient's daughter and designated 

contact, about an update on patient's condition and overall prognosis with 

recrudescence of her illness due to COVID-19 pneumonia. Mrs Hannon remains 

hopeful that her mother will survive this hospitalization, though understands 

that may not be the case and that we feel Mrs Faustin will not survive. Mrs Hanonn would like Mrs Faustin to remain full code status at this time. I 

explained to her that if cardiac arrest occurs without being due to refractory 

hypoxia, then we will proceed with her wishes in resuscitation. However, if the 

patient deteriorates further with refractory hypoxia and that leads to her 

arrest, we will not perform CPR due to medical futility with the inability to 

survive despite the possibility of brief return of spontaneous circulation. This

would undoubtedly expose several staff members to COVID-19 in a futile effort 

under those circumstances without the ability to save Mrs Faustin's life. Dr Conner and myself had this discussion earlier for which Crystal Hannon understands 

without objection. I appreciate her understanding during these very harsh times 

fighting the COVID-19 pandemic. Mrs Hannon was grateful that she and her sister 

were able to briefly visit their mother and that we were able to FaceTime with 

her family members in the parking lot from the [employer] the hospital/ICU has 

purchased to help families in this time of need.

## 2020-04-21 LAB
ABSOLUTE LYMPHOCYTES# (MANUAL): 0.3 10^3/UL (ref 0.5–4.7)
ABSOLUTE MONOCYTES # (MANUAL): 0.3 10^3/UL (ref 0.1–1.4)
ADD MANUAL DIFF: YES
ARTERIAL BLOOD FIO2: (no result)
ARTERIAL BLOOD FIO2: (no result)
ARTERIAL BLOOD H2CO3: 1.59 MMOL/L (ref 1.05–1.35)
ARTERIAL BLOOD H2CO3: 2 MMOL/L (ref 1.05–1.35)
ARTERIAL BLOOD HCO3: 27.1 MMOL/L (ref 20–24)
ARTERIAL BLOOD HCO3: 28.8 MMOL/L (ref 20–24)
ARTERIAL BLOOD PCO2: 52.9 MMHG (ref 35–45)
ARTERIAL BLOOD PCO2: 66.4 MMHG (ref 35–45)
ARTERIAL BLOOD PH: 7.26 (ref 7.35–7.45)
ARTERIAL BLOOD PH: 7.33 (ref 7.35–7.45)
ARTERIAL BLOOD PO2: 76.5 MMHG (ref 80–100)
ARTERIAL BLOOD PO2: 76.5 MMHG (ref 80–100)
ARTERIAL BLOOD TOTAL CO2: 28.7 MMOL/L (ref 21–25)
ARTERIAL BLOOD TOTAL CO2: 30.8 MMOL/L (ref 21–25)
BASE EXCESS BLDA CALC-SCNC: 0.8 MMOL/L
BASE EXCESS BLDA CALC-SCNC: 1.2 MMOL/L
BASOPHILS NFR BLD MANUAL: 0 % (ref 0–2)
EOSINOPHIL NFR BLD MANUAL: 0 % (ref 0–6)
ERYTHROCYTE [DISTWIDTH] IN BLOOD BY AUTOMATED COUNT: 15.9 % (ref 11.5–14)
ERYTHROCYTE [DISTWIDTH] IN BLOOD BY AUTOMATED COUNT: 16.2 % (ref 11.5–14)
HCT VFR BLD CALC: 21.3 % (ref 36–47)
HCT VFR BLD CALC: 23 % (ref 36–47)
HGB BLD-MCNC: 7.1 G/DL (ref 12–15.5)
HGB BLD-MCNC: 7.9 G/DL (ref 12–15.5)
MCH RBC QN AUTO: 28.7 PG (ref 27–33.4)
MCH RBC QN AUTO: 29.5 PG (ref 27–33.4)
MCHC RBC AUTO-ENTMCNC: 33.3 G/DL (ref 32–36)
MCHC RBC AUTO-ENTMCNC: 34.2 G/DL (ref 32–36)
MCV RBC AUTO: 86 FL (ref 80–97)
MCV RBC AUTO: 86 FL (ref 80–97)
MONOCYTES % (MANUAL): 3 % (ref 3–13)
NRBC BLD AUTO-RTO: 3 /100 WBC
PLATELET # BLD: 62 10^3/UL (ref 150–450)
PLATELET # BLD: 67 10^3/UL (ref 150–450)
PLATELET COMMENT: (no result)
RBC # BLD AUTO: 2.47 10^6/UL (ref 3.72–5.28)
RBC # BLD AUTO: 2.67 10^6/UL (ref 3.72–5.28)
SAO2 % BLDA: 92.8 % (ref 94–98)
SAO2 % BLDA: 94.2 % (ref 94–98)
SEGMENTED NEUTROPHILS % (MAN): 94 % (ref 42–78)
TOTAL CELLS COUNTED BLD: 100
TOXIC GRANULES BLD QL SMEAR: SLIGHT
VARIANT LYMPHS NFR BLD MANUAL: 3 % (ref 13–45)
WBC # BLD AUTO: 8.4 10^3/UL (ref 4–10.5)
WBC # BLD AUTO: 8.5 10^3/UL (ref 4–10.5)

## 2020-04-21 PROCEDURE — 30233N1 TRANSFUSION OF NONAUTOLOGOUS RED BLOOD CELLS INTO PERIPHERAL VEIN, PERCUTANEOUS APPROACH: ICD-10-PCS | Performed by: HOSPITALIST

## 2020-04-21 RX ADMIN — MIDAZOLAM HYDROCHLORIDE PRN MLS/HR: 5 INJECTION, SOLUTION INTRAMUSCULAR; INTRAVENOUS at 16:56

## 2020-04-21 RX ADMIN — MIDAZOLAM HYDROCHLORIDE PRN MLS/HR: 5 INJECTION, SOLUTION INTRAMUSCULAR; INTRAVENOUS at 18:04

## 2020-04-21 RX ADMIN — METHOCARBAMOL SCH MG: 500 TABLET ORAL at 14:28

## 2020-04-21 RX ADMIN — ENOXAPARIN SODIUM SCH MG: 40 INJECTION SUBCUTANEOUS at 10:06

## 2020-04-21 RX ADMIN — MIDAZOLAM HYDROCHLORIDE PRN MLS/HR: 5 INJECTION, SOLUTION INTRAMUSCULAR; INTRAVENOUS at 14:07

## 2020-04-21 RX ADMIN — MIDAZOLAM HYDROCHLORIDE PRN MLS/HR: 5 INJECTION, SOLUTION INTRAMUSCULAR; INTRAVENOUS at 01:57

## 2020-04-21 RX ADMIN — ALBUMIN (HUMAN) SCH MLS/HR: 12.5 SOLUTION INTRAVENOUS at 05:18

## 2020-04-21 RX ADMIN — MORPHINE SULFATE PRN MLS/HR: 1 INJECTION, SOLUTION INTRAVENOUS at 19:40

## 2020-04-21 RX ADMIN — MORPHINE SULFATE PRN MLS/HR: 1 INJECTION, SOLUTION INTRAVENOUS at 03:57

## 2020-04-21 RX ADMIN — INSULIN HUMAN SCH UNIT: 100 INJECTION, SOLUTION PARENTERAL at 18:03

## 2020-04-21 RX ADMIN — VITAMIN D, TAB 1000IU (100/BT) SCH UNIT: 25 TAB at 10:05

## 2020-04-21 RX ADMIN — FAMOTIDINE SCH MG: 20 TABLET, FILM COATED ORAL at 10:05

## 2020-04-21 RX ADMIN — INSULIN HUMAN SCH UNIT: 100 INJECTION, SOLUTION PARENTERAL at 02:03

## 2020-04-21 RX ADMIN — Medication SCH MG: at 10:05

## 2020-04-21 RX ADMIN — FAMOTIDINE SCH MG: 20 TABLET, FILM COATED ORAL at 21:05

## 2020-04-21 RX ADMIN — INSULIN HUMAN SCH: 100 INJECTION, SOLUTION PARENTERAL at 21:14

## 2020-04-21 RX ADMIN — HYDROCORTISONE SODIUM SUCCINATE SCH MG: 100 INJECTION, POWDER, FOR SOLUTION INTRAMUSCULAR; INTRAVENOUS at 14:27

## 2020-04-21 RX ADMIN — MIDAZOLAM HYDROCHLORIDE PRN MLS/HR: 5 INJECTION, SOLUTION INTRAMUSCULAR; INTRAVENOUS at 10:02

## 2020-04-21 RX ADMIN — MINERAL OIL AND WHITE PETROLATUM SCH APPLIC: 150; 830 OINTMENT OPHTHALMIC at 05:18

## 2020-04-21 RX ADMIN — MORPHINE SULFATE PRN MLS/HR: 1 INJECTION, SOLUTION INTRAVENOUS at 12:00

## 2020-04-21 RX ADMIN — ROCURONIUM BROMIDE PRN MLS/HR: 10 INJECTION, SOLUTION INTRAVENOUS at 23:38

## 2020-04-21 RX ADMIN — MIDAZOLAM HYDROCHLORIDE PRN MLS/HR: 5 INJECTION, SOLUTION INTRAMUSCULAR; INTRAVENOUS at 03:54

## 2020-04-21 RX ADMIN — MINERAL OIL AND WHITE PETROLATUM SCH APPLIC: 150; 830 OINTMENT OPHTHALMIC at 21:05

## 2020-04-21 RX ADMIN — ROCURONIUM BROMIDE PRN MLS/HR: 10 INJECTION, SOLUTION INTRAVENOUS at 11:00

## 2020-04-21 RX ADMIN — Medication SCH: at 14:28

## 2020-04-21 RX ADMIN — Medication SCH MG: at 21:05

## 2020-04-21 RX ADMIN — Medication SCH: at 21:05

## 2020-04-21 RX ADMIN — MINERAL OIL AND WHITE PETROLATUM SCH APPLIC: 150; 830 OINTMENT OPHTHALMIC at 14:27

## 2020-04-21 RX ADMIN — METHOCARBAMOL SCH MG: 500 TABLET ORAL at 21:04

## 2020-04-21 RX ADMIN — SODIUM CHLORIDE, SODIUM LACTATE, POTASSIUM CHLORIDE, AND CALCIUM CHLORIDE PRN MLS/HR: .6; .31; .03; .02 INJECTION, SOLUTION INTRAVENOUS at 10:02

## 2020-04-21 RX ADMIN — MIDAZOLAM HYDROCHLORIDE PRN MLS/HR: 5 INJECTION, SOLUTION INTRAMUSCULAR; INTRAVENOUS at 21:04

## 2020-04-21 RX ADMIN — HYDROCORTISONE SODIUM SUCCINATE SCH MG: 100 INJECTION, POWDER, FOR SOLUTION INTRAMUSCULAR; INTRAVENOUS at 05:18

## 2020-04-21 RX ADMIN — HYDROCORTISONE SODIUM SUCCINATE SCH MG: 100 INJECTION, POWDER, FOR SOLUTION INTRAMUSCULAR; INTRAVENOUS at 21:05

## 2020-04-21 RX ADMIN — INSULIN HUMAN SCH: 100 INJECTION, SOLUTION PARENTERAL at 05:16

## 2020-04-21 RX ADMIN — OXYCODONE HYDROCHLORIDE AND ACETAMINOPHEN SCH MG: 500 TABLET ORAL at 10:06

## 2020-04-21 RX ADMIN — ALBUMIN (HUMAN) SCH MLS/HR: 12.5 SOLUTION INTRAVENOUS at 14:26

## 2020-04-21 RX ADMIN — MIDAZOLAM HYDROCHLORIDE PRN MLS/HR: 5 INJECTION, SOLUTION INTRAMUSCULAR; INTRAVENOUS at 23:38

## 2020-04-21 RX ADMIN — METHOCARBAMOL SCH MG: 500 TABLET ORAL at 05:18

## 2020-04-21 RX ADMIN — ALBUMIN (HUMAN) SCH MLS/HR: 12.5 SOLUTION INTRAVENOUS at 21:03

## 2020-04-21 RX ADMIN — INSULIN HUMAN SCH UNIT: 100 INJECTION, SOLUTION PARENTERAL at 14:33

## 2020-04-21 RX ADMIN — OXYCODONE HYDROCHLORIDE AND ACETAMINOPHEN SCH MG: 500 TABLET ORAL at 18:04

## 2020-04-21 RX ADMIN — INSULIN HUMAN SCH: 100 INJECTION, SOLUTION PARENTERAL at 10:01

## 2020-04-21 RX ADMIN — Medication SCH: at 05:17

## 2020-04-21 NOTE — PDOC CRITICAL CARE PROG REPORT
General


Date:: 04/21/20


ICU Day:: 11


Ventilator Day:: 11


Hospital Day:: 16


Resuscitation Status: Full Code


Medical Power of : Maryuri Ledesma


Events in the past 12 to 24 Hours:: 





Oxygenation has been maintained in supine position.


Review of systems relevant to events:: 





Respiratory.


Reason for ICU Addmission:: Acute hypoxic respiratory failure with SARS, 2-

CoViD19. Intubated. Proned.





- Medications:


Medications reviewed and adjusted accordingly: Yes


Vasopressors:: 





Levophed


Sedation:: 





Midazolam, morphine.





Physical Exam


Vital Signs: 


                                        











Temp Pulse Resp BP Pulse Ox


 


 97.0 F   81   15   96/54 L  93 


 


 04/21/20 10:15  04/21/20 08:00  04/21/20 10:15  04/21/20 08:00  04/21/20 10:15








                                 Intake & Output











 04/20/20 04/21/20 04/22/20





 06:59 06:59 06:59


 


Intake Total 3076 2658 676


 


Output Total 940 1775 220


 


Balance 2136 883 456


 


Weight 114.7 kg 114.7 kg 








                                  Weight/Height





Weight                           114.7 kg


Height                           5 ft 2 in








General appearance: PRESENT: morbidly obese


Head exam: PRESENT: atraumatic, normocephalic


Eye exam: PRESENT: conjunctiva pink, EOMI, PERRLA.  ABSENT: scleral icterus


Ear exam: PRESENT: normal external ear exam


Mouth exam: PRESENT: moist, tongue midline


Respiratory exam: PRESENT: symmetrical


Cardiovascular exam: PRESENT: RRR.  ABSENT: diastolic murmur, rubs, systolic mu

rmur


GI/Abdominal exam: PRESENT: normal bowel sounds, soft.  ABSENT: distended, 

guarding, mass, organolmegaly, rebound, tenderness


Rectal exam: PRESENT: deferred


Gentrourinary exam: PRESENT: indwelling catheter


Extremities exam: PRESENT: full ROM.  ABSENT: calf tenderness, clubbing, pedal 

edema


Neurological exam: PRESENT: other - Sedated and under neuromuscular blockers.


Skin exam: PRESENT: dry, intact, warm, other - Small decubitus on back..  AB

SENT: cyanosis, rash


Tubes/Lines: PRESENT: Endotracheal Tube, Central Line, Arterial Catheter, 

Nasogastic Tube





Laboratory/Radiographs


Laboratory Results: 


                                        





                                 04/21/20 03:50 





                                 04/20/20 04:00 





                                        











  04/20/20 04/21/20 04/21/20





  21:25 03:50 05:45


 


WBC   8.4 


 


RBC   2.47 L 


 


Hgb   7.1 L 


 


Hct   21.3 L 


 


MCV   86 


 


MCH   28.7 


 


MCHC   33.3 


 


RDW   15.9 H 


 


Plt Count   67 L 


 


Seg Neutrophils %   Not Reportable 


 


Carbonic Acid  2.00 H   2.00 H


 


HCO3/H2CO3 Ratio  12:1   14:1


 


ABG pH  7.21 L   7.26 L


 


ABG pCO2  66.4 H   66.4 H


 


ABG pO2  75.5 L   76.5 L


 


ABG HCO3  25.8 H   28.8 H


 


ABG O2 Saturation  91.6 L   92.8 L


 


ABG Base Excess  -2.5   1.2


 


FiO2  15   100%














  04/21/20





  08:25


 


WBC 


 


RBC 


 


Hgb 


 


Hct 


 


MCV 


 


MCH 


 


MCHC 


 


RDW 


 


Plt Count 


 


Seg Neutrophils % 


 


Carbonic Acid  1.59 H


 


HCO3/H2CO3 Ratio  17:1


 


ABG pH  7.33 L


 


ABG pCO2  52.9 H


 


ABG pO2  76.5 L


 


ABG HCO3  27.1 H


 


ABG O2 Saturation  94.2


 


ABG Base Excess  0.8


 


FiO2  100%








                                        











  04/05/20 04/10/20 04/10/20





  14:00 11:48 11:48


 


Creatine Kinase   105 


 


CK-MB (CK-2)    0.87


 


Troponin I    < 0.012


 


NT-Pro-B Natriuret Pep  32  














  04/10/20 04/10/20 04/11/20





  22:45 22:45 04:45


 


Creatine Kinase  60   46


 


CK-MB (CK-2)   0.41 


 


Troponin I   0.018 


 


NT-Pro-B Natriuret Pep   














  04/11/20





  04:45


 


Creatine Kinase 


 


CK-MB (CK-2)  0.47


 


Troponin I  < 0.012


 


NT-Pro-B Natriuret Pep 











Impressions: 


                                        





Chest/Abdomen CTA  04/05/20 16:08


IMPRESSION:


1. Patchy peripheral and perihilar ground-glass opacities in both lungs.  These 

are commonly reported imaging features of COVID-19   pneumonia are present. 

Other processes such as influenza pneumonia and organizing pneumonia, as can be 

seen with drug toxicity and connective tissue disease, can cause a similar 

imaging pattern. PneTyp


2. Limited evaluation of the pulmonary arteries due to contrast bolus timing.  

No large central pulmonary embolus.  Evaluation of the segmental and 

subsegmental pulmonary arteries is limited.


 








Chest X-Ray  04/17/20 00:00


IMPRESSION:


 


Little interval change


 


 


copyright 2011 [employer]- All Rights Reserved


 











All labs, radiographs, diagnostic studies and EKGs were personally reviewed: Yes


In addition, reports of radiographic and diagnostic studies were read: Yes





Assessment and Plan





- Diagnosis


(1) COVID-19 virus infection


Is this a current diagnosis for this admission?: Yes   


Plan: 


The pulmonary process is not improved despite being on the ventilator for 16 

days. Her oxygenation is in the low 902 but on 100% and 12 PEEP. Prognosis still

remains poor.








(2) Acute hypoxemic respiratory failure


Is this a current diagnosis for this admission?: Yes   


Plan: 


As above.








(3) T2DM (type 2 diabetes mellitus)


Qualifiers: 


   Diabetes mellitus long term insulin use: without long term use   Diabetes 

mellitus complication status: without complication   Qualified Code(s): E11.9 - 

Type 2 diabetes mellitus without complications   


Is this a current diagnosis for this admission?: Yes   


Plan: 


Controlled.





Plan Summary: 





Maintain current course. Prone as needed.





Critical Time


Critical Time (minutes): 35


Level of Care: ICU


Anticipated discharge: SNF


Within: Other


-: 


1.  The care of a critical patient is a dynamic process.  This note is a 

representative synopsis but static in nature.  The timeframe for treatments g

iven in order is not necessarily the actual time these treatments may have been 

done.





2.  This patient requires critical care secondary to ongoing requirements for 

therapy not offered or safe outside the critical care environment.  Transfer to 

a lower level of care will result in altered life or limb morbidity and 

mortality.





3.  Multidisciplinary rounds completed.





4.  ABCDE bundle addressed.

## 2020-04-22 LAB
ABSOLUTE LYMPHOCYTES# (MANUAL): 0.6 10^3/UL (ref 0.5–4.7)
ABSOLUTE MONOCYTES # (MANUAL): 0.6 10^3/UL (ref 0.1–1.4)
ADD MANUAL DIFF: YES
ANISOCYTOSIS BLD QL SMEAR: (no result)
BASOPHILS NFR BLD MANUAL: 0 % (ref 0–2)
EOSINOPHIL NFR BLD MANUAL: 0 % (ref 0–6)
ERYTHROCYTE [DISTWIDTH] IN BLOOD BY AUTOMATED COUNT: 16.1 % (ref 11.5–14)
HCT VFR BLD CALC: 23.9 % (ref 36–47)
HGB BLD-MCNC: 8.1 G/DL (ref 12–15.5)
MCH RBC QN AUTO: 29.5 PG (ref 27–33.4)
MCHC RBC AUTO-ENTMCNC: 34 G/DL (ref 32–36)
MCV RBC AUTO: 87 FL (ref 80–97)
MONOCYTES % (MANUAL): 6 % (ref 3–13)
NRBC BLD AUTO-RTO: 2 /100 WBC
OVALOCYTES BLD QL SMEAR: SLIGHT
PLATELET # BLD: 70 10^3/UL (ref 150–450)
PLATELET COMMENT: (no result)
POIKILOCYTOSIS BLD QL SMEAR: SLIGHT
POLYCHROMASIA BLD QL SMEAR: SLIGHT
RBC # BLD AUTO: 2.76 10^6/UL (ref 3.72–5.28)
SCHISTOCYTES BLD QL SMEAR: SLIGHT
SEGMENTED NEUTROPHILS % (MAN): 88 % (ref 42–78)
TOTAL CELLS COUNTED BLD: 100
TOXIC GRANULES BLD QL SMEAR: (no result)
VARIANT LYMPHS NFR BLD MANUAL: 6 % (ref 13–45)
WBC # BLD AUTO: 9.5 10^3/UL (ref 4–10.5)

## 2020-04-22 RX ADMIN — MIDAZOLAM HYDROCHLORIDE PRN MLS/HR: 5 INJECTION, SOLUTION INTRAMUSCULAR; INTRAVENOUS at 05:30

## 2020-04-22 RX ADMIN — MIDAZOLAM HYDROCHLORIDE PRN MLS/HR: 5 INJECTION, SOLUTION INTRAMUSCULAR; INTRAVENOUS at 15:15

## 2020-04-22 RX ADMIN — ENOXAPARIN SODIUM SCH MG: 40 INJECTION SUBCUTANEOUS at 10:39

## 2020-04-22 RX ADMIN — Medication SCH: at 05:59

## 2020-04-22 RX ADMIN — ALBUMIN (HUMAN) SCH MLS/HR: 12.5 SOLUTION INTRAVENOUS at 15:13

## 2020-04-22 RX ADMIN — OXYCODONE HYDROCHLORIDE AND ACETAMINOPHEN SCH MG: 500 TABLET ORAL at 10:41

## 2020-04-22 RX ADMIN — HYDROMORPHONE HYDROCHLORIDE PRN MG: 2 INJECTION INTRAMUSCULAR; INTRAVENOUS; SUBCUTANEOUS at 23:11

## 2020-04-22 RX ADMIN — MIDAZOLAM HYDROCHLORIDE PRN MLS/HR: 5 INJECTION, SOLUTION INTRAMUSCULAR; INTRAVENOUS at 11:29

## 2020-04-22 RX ADMIN — MINERAL OIL AND WHITE PETROLATUM SCH APPLIC: 150; 830 OINTMENT OPHTHALMIC at 06:06

## 2020-04-22 RX ADMIN — Medication SCH: at 21:05

## 2020-04-22 RX ADMIN — MINERAL OIL AND WHITE PETROLATUM SCH APPLIC: 150; 830 OINTMENT OPHTHALMIC at 21:04

## 2020-04-22 RX ADMIN — METHOCARBAMOL SCH MG: 500 TABLET ORAL at 21:06

## 2020-04-22 RX ADMIN — HYDROCORTISONE SODIUM SUCCINATE SCH MG: 100 INJECTION, POWDER, FOR SOLUTION INTRAMUSCULAR; INTRAVENOUS at 15:16

## 2020-04-22 RX ADMIN — MIDAZOLAM HYDROCHLORIDE PRN MLS/HR: 5 INJECTION, SOLUTION INTRAMUSCULAR; INTRAVENOUS at 23:11

## 2020-04-22 RX ADMIN — METHOCARBAMOL SCH MG: 500 TABLET ORAL at 15:16

## 2020-04-22 RX ADMIN — INSULIN HUMAN SCH UNIT: 100 INJECTION, SOLUTION PARENTERAL at 15:04

## 2020-04-22 RX ADMIN — MINERAL OIL AND WHITE PETROLATUM SCH: 150; 830 OINTMENT OPHTHALMIC at 21:05

## 2020-04-22 RX ADMIN — MORPHINE SULFATE PRN MLS/HR: 1 INJECTION, SOLUTION INTRAVENOUS at 12:30

## 2020-04-22 RX ADMIN — INSULIN HUMAN SCH UNIT: 100 INJECTION, SOLUTION PARENTERAL at 02:19

## 2020-04-22 RX ADMIN — ROCURONIUM BROMIDE PRN MLS/HR: 10 INJECTION, SOLUTION INTRAVENOUS at 11:34

## 2020-04-22 RX ADMIN — Medication SCH MG: at 10:41

## 2020-04-22 RX ADMIN — HYDROMORPHONE HYDROCHLORIDE PRN MG: 2 INJECTION INTRAMUSCULAR; INTRAVENOUS; SUBCUTANEOUS at 15:41

## 2020-04-22 RX ADMIN — MIDAZOLAM HYDROCHLORIDE PRN MLS/HR: 5 INJECTION, SOLUTION INTRAMUSCULAR; INTRAVENOUS at 17:20

## 2020-04-22 RX ADMIN — HYDROCORTISONE SODIUM SUCCINATE SCH MG: 100 INJECTION, POWDER, FOR SOLUTION INTRAMUSCULAR; INTRAVENOUS at 21:06

## 2020-04-22 RX ADMIN — METHOCARBAMOL SCH MG: 500 TABLET ORAL at 06:06

## 2020-04-22 RX ADMIN — ALBUMIN (HUMAN) SCH MLS/HR: 12.5 SOLUTION INTRAVENOUS at 05:59

## 2020-04-22 RX ADMIN — INSULIN HUMAN SCH: 100 INJECTION, SOLUTION PARENTERAL at 18:00

## 2020-04-22 RX ADMIN — Medication SCH MG: at 21:06

## 2020-04-22 RX ADMIN — INSULIN HUMAN SCH UNIT: 100 INJECTION, SOLUTION PARENTERAL at 10:43

## 2020-04-22 RX ADMIN — MIDAZOLAM HYDROCHLORIDE PRN MLS/HR: 5 INJECTION, SOLUTION INTRAMUSCULAR; INTRAVENOUS at 20:15

## 2020-04-22 RX ADMIN — MORPHINE SULFATE PRN MLS/HR: 1 INJECTION, SOLUTION INTRAVENOUS at 19:49

## 2020-04-22 RX ADMIN — FAMOTIDINE SCH MG: 20 TABLET, FILM COATED ORAL at 10:41

## 2020-04-22 RX ADMIN — MIDAZOLAM HYDROCHLORIDE PRN MLS/HR: 5 INJECTION, SOLUTION INTRAMUSCULAR; INTRAVENOUS at 08:30

## 2020-04-22 RX ADMIN — MIDAZOLAM HYDROCHLORIDE PRN MLS/HR: 5 INJECTION, SOLUTION INTRAMUSCULAR; INTRAVENOUS at 02:54

## 2020-04-22 RX ADMIN — HYDROCORTISONE SODIUM SUCCINATE SCH MG: 100 INJECTION, POWDER, FOR SOLUTION INTRAMUSCULAR; INTRAVENOUS at 05:59

## 2020-04-22 RX ADMIN — MORPHINE SULFATE PRN MLS/HR: 1 INJECTION, SOLUTION INTRAVENOUS at 04:10

## 2020-04-22 RX ADMIN — INSULIN HUMAN SCH UNIT: 100 INJECTION, SOLUTION PARENTERAL at 22:50

## 2020-04-22 RX ADMIN — OXYCODONE HYDROCHLORIDE AND ACETAMINOPHEN SCH MG: 500 TABLET ORAL at 18:06

## 2020-04-22 RX ADMIN — FAMOTIDINE SCH MG: 20 TABLET, FILM COATED ORAL at 21:06

## 2020-04-22 RX ADMIN — VITAMIN D, TAB 1000IU (100/BT) SCH UNIT: 25 TAB at 10:41

## 2020-04-22 RX ADMIN — ROCURONIUM BROMIDE PRN MLS/HR: 10 INJECTION, SOLUTION INTRAVENOUS at 19:40

## 2020-04-22 RX ADMIN — INSULIN HUMAN SCH UNIT: 100 INJECTION, SOLUTION PARENTERAL at 06:16

## 2020-04-22 NOTE — PDOC CRITICAL CARE PROG REPORT
General


Date:: 04/22/20


ICU Day:: 12


Ventilator Day:: 12


Hospital Day:: 17


Resuscitation Status: Full Code


Medical Power of : Maryuri Ledesma


Events in the past 12 to 24 Hours:: 





Essentially unchanged but no worse.


Review of systems relevant to events:: 





Respiratory.


Reason for ICU Addmission:: Acute hypoxic respiratory failure with SARS, 2-

CoViD19. Intubated. Supine.





- Medications:


Medications reviewed and adjusted accordingly: Yes


Vasopressors:: 





Levophed.


Sedation:: 


Versed, Morphine.





Physical Exam


Vital Signs: 


                                        











Temp Pulse Resp BP Pulse Ox


 


 97.0 F   82   19   136/70 H  92 


 


 04/22/20 06:15  04/21/20 20:00  04/22/20 06:15  04/22/20 08:00  04/22/20 11:07








                                 Intake & Output











 04/21/20 04/22/20 04/23/20





 06:59 06:59 06:59


 


Intake Total 2658 2518 580


 


Output Total 1775 1285 75


 


Balance 883 1233 505


 


Weight 114.7 kg 115.3 kg 








                                  Weight/Height





Weight                           115.3 kg


Height                           5 ft 2 in








General appearance: PRESENT: no acute distress, morbidly obese


Head exam: PRESENT: atraumatic, normocephalic


Eye exam: PRESENT: conjunctiva pink, EOMI, PERRLA.  ABSENT: scleral icterus


Ear exam: PRESENT: normal external ear exam


Mouth exam: PRESENT: moist, tongue midline


Respiratory exam: PRESENT: symmetrical, unlabored


Cardiovascular exam: PRESENT: RRR.  ABSENT: diastolic murmur, rubs, systolic 

murmur


GI/Abdominal exam: PRESENT: normal bowel sounds, soft.  ABSENT: distended, 

guarding, mass, organolmegaly, rebound, tenderness


Rectal exam: PRESENT: deferred


Extremities exam: PRESENT: full ROM.  ABSENT: calf tenderness, clubbing, pedal 

edema


Neurological exam: PRESENT: other - She is both sedated and under neuromuscular 

blockade.


Skin exam: PRESENT: dry, intact, warm, other - Small saral decubitus..  ABSENT: 

cyanosis, rash


Tubes/Lines: PRESENT: Endotracheal Tube, Central Line, Arterial Catheter, 

Nasogastic Tube





Laboratory/Radiographs


Laboratory Results: 


                                        





                                 04/22/20 04:27 





                                 04/20/20 04:00 





                                        











  04/21/20 04/21/20 04/22/20





  15:44 22:52 04:27


 


WBC   8.5  9.5


 


RBC   2.67 L  2.76 L


 


Hgb   7.9 L  8.1 L


 


Hct   23.0 L  23.9 L


 


MCV   86  87


 


MCH   29.5  29.5


 


MCHC   34.2  34.0


 


RDW   16.2 H  16.1 H


 


Plt Count   62 L  70 L


 


Seg Neutrophils %    Not Reportable


 


Triglycerides   


 


Blood Type  A POSITIVE  


 


Antibody Screen  NEGATIVE  














  04/22/20





  04:27


 


WBC 


 


RBC 


 


Hgb 


 


Hct 


 


MCV 


 


MCH 


 


MCHC 


 


RDW 


 


Plt Count 


 


Seg Neutrophils % 


 


Triglycerides  102


 


Blood Type 


 


Antibody Screen 








                                        











  04/05/20 04/10/20 04/10/20





  14:00 11:48 11:48


 


Creatine Kinase   105 


 


CK-MB (CK-2)    0.87


 


Troponin I    < 0.012


 


NT-Pro-B Natriuret Pep  32  














  04/10/20 04/10/20 04/11/20





  22:45 22:45 04:45


 


Creatine Kinase  60   46


 


CK-MB (CK-2)   0.41 


 


Troponin I   0.018 


 


NT-Pro-B Natriuret Pep   














  04/11/20





  04:45


 


Creatine Kinase 


 


CK-MB (CK-2)  0.47


 


Troponin I  < 0.012


 


NT-Pro-B Natriuret Pep 











Impressions: 


                                        





Chest/Abdomen CTA  04/05/20 16:08


IMPRESSION:


1. Patchy peripheral and perihilar ground-glass opacities in both lungs.  These 

are commonly reported imaging features of COVID-19   pneumonia are present. 

Other processes such as influenza pneumonia and organizing pneumonia, as can be 

seen with drug toxicity and connective tissue disease, can cause a similar 

imaging pattern. PneTyp


2. Limited evaluation of the pulmonary arteries due to contrast bolus timing.  

No large central pulmonary embolus.  Evaluation of the segmental and 

subsegmental pulmonary arteries is limited.


 








Chest X-Ray  04/17/20 00:00


IMPRESSION:


 


Little interval change


 


 


copyright 2011 [employer]- All Rights Reserved


 











All labs, radiographs, diagnostic studies and EKGs were personally reviewed: Yes


In addition, reports of radiographic and diagnostic studies were read: Yes





Assessment and Plan





- Diagnosis


(1) COVID-19 virus infection


Is this a current diagnosis for this admission?: Yes   


Plan: 


At this point she has received hydroxychloroquine antibiotics and we are 

awaiting this disease porcess to run its course








(2) Acute hypoxemic respiratory failure


Is this a current diagnosis for this admission?: Yes   


Plan: 


Maitained on the ventalator at 100% for several days and unable to wean as yet.








(3) T2DM (type 2 diabetes mellitus)


Qualifiers: 


   Diabetes mellitus long term insulin use: without long term use   Diabetes 

mellitus complication status: without complication   Qualified Code(s): E11.9 - 

Type 2 diabetes mellitus without complications   


Is this a current diagnosis for this admission?: Yes   


Plan: 


Controlled.





Plan Summary: 





Despite the risks for oxygen toxicity we are unable to wean FiO2 down below 90%.

We will need to maintain her in the present state until her lungs improve-if 

they can.





Critical Time


Critical Time (minutes): 35


Level of Care: ICU


Anticipated discharge: SNF


Within: Other


-: 


1.  The care of a critical patient is a dynamic process.  This note is a 

representative synopsis but static in nature.  The timeframe for treatments 

given in order is not necessarily the actual time these treatments may have been

done.





2.  This patient requires critical care secondary to ongoing requirements for 

therapy not offered or safe outside the critical care environment.  Transfer to 

a lower level of care will result in altered life or limb morbidity and 

mortality.





3.  Multidisciplinary rounds completed.





4.  ABCDE bundle addressed.

## 2020-04-23 LAB
ANION GAP SERPL CALC-SCNC: 9 MMOL/L (ref 5–19)
BUN SERPL-MCNC: 53 MG/DL (ref 7–20)
CALCIUM: 8.5 MG/DL (ref 8.4–10.2)
CHLORIDE SERPL-SCNC: 112 MMOL/L (ref 98–107)
CO2 SERPL-SCNC: 25 MMOL/L (ref 22–30)
GLUCOSE SERPL-MCNC: 294 MG/DL (ref 75–110)
POTASSIUM SERPL-SCNC: 4.9 MMOL/L (ref 3.6–5)

## 2020-04-23 RX ADMIN — MIDAZOLAM HYDROCHLORIDE PRN MLS/HR: 5 INJECTION, SOLUTION INTRAMUSCULAR; INTRAVENOUS at 13:16

## 2020-04-23 RX ADMIN — METHOCARBAMOL SCH MG: 500 TABLET ORAL at 21:01

## 2020-04-23 RX ADMIN — Medication SCH: at 13:16

## 2020-04-23 RX ADMIN — ROCURONIUM BROMIDE PRN MLS/HR: 10 INJECTION, SOLUTION INTRAVENOUS at 23:14

## 2020-04-23 RX ADMIN — FAMOTIDINE SCH MG: 20 TABLET, FILM COATED ORAL at 21:01

## 2020-04-23 RX ADMIN — METHOCARBAMOL SCH MG: 500 TABLET ORAL at 05:37

## 2020-04-23 RX ADMIN — FAMOTIDINE SCH MG: 20 TABLET, FILM COATED ORAL at 10:24

## 2020-04-23 RX ADMIN — INSULIN HUMAN SCH UNIT: 100 INJECTION, SOLUTION PARENTERAL at 14:06

## 2020-04-23 RX ADMIN — Medication SCH: at 21:04

## 2020-04-23 RX ADMIN — MIDAZOLAM HYDROCHLORIDE PRN MLS/HR: 5 INJECTION, SOLUTION INTRAMUSCULAR; INTRAVENOUS at 15:46

## 2020-04-23 RX ADMIN — MORPHINE SULFATE PRN MLS/HR: 1 INJECTION, SOLUTION INTRAVENOUS at 04:12

## 2020-04-23 RX ADMIN — OXYCODONE HYDROCHLORIDE AND ACETAMINOPHEN SCH MG: 500 TABLET ORAL at 18:07

## 2020-04-23 RX ADMIN — INSULIN HUMAN SCH UNIT: 100 INJECTION, SOLUTION PARENTERAL at 18:07

## 2020-04-23 RX ADMIN — MINERAL OIL AND WHITE PETROLATUM SCH APPLIC: 150; 830 OINTMENT OPHTHALMIC at 05:36

## 2020-04-23 RX ADMIN — Medication SCH MG: at 10:24

## 2020-04-23 RX ADMIN — HYDROCORTISONE SODIUM SUCCINATE SCH MG: 100 INJECTION, POWDER, FOR SOLUTION INTRAMUSCULAR; INTRAVENOUS at 14:08

## 2020-04-23 RX ADMIN — MIDAZOLAM HYDROCHLORIDE PRN MLS/HR: 5 INJECTION, SOLUTION INTRAMUSCULAR; INTRAVENOUS at 21:05

## 2020-04-23 RX ADMIN — MIDAZOLAM HYDROCHLORIDE PRN MLS/HR: 5 INJECTION, SOLUTION INTRAMUSCULAR; INTRAVENOUS at 10:46

## 2020-04-23 RX ADMIN — MINERAL OIL AND WHITE PETROLATUM SCH APPLIC: 150; 830 OINTMENT OPHTHALMIC at 21:04

## 2020-04-23 RX ADMIN — MIDAZOLAM HYDROCHLORIDE PRN MLS/HR: 5 INJECTION, SOLUTION INTRAMUSCULAR; INTRAVENOUS at 07:15

## 2020-04-23 RX ADMIN — HYDROCORTISONE SODIUM SUCCINATE SCH MG: 100 INJECTION, POWDER, FOR SOLUTION INTRAMUSCULAR; INTRAVENOUS at 05:37

## 2020-04-23 RX ADMIN — OXYCODONE HYDROCHLORIDE AND ACETAMINOPHEN SCH MG: 500 TABLET ORAL at 10:25

## 2020-04-23 RX ADMIN — INSULIN HUMAN SCH UNIT: 100 INJECTION, SOLUTION PARENTERAL at 21:03

## 2020-04-23 RX ADMIN — INSULIN HUMAN SCH UNIT: 100 INJECTION, SOLUTION PARENTERAL at 01:13

## 2020-04-23 RX ADMIN — METHOCARBAMOL SCH MG: 500 TABLET ORAL at 14:08

## 2020-04-23 RX ADMIN — ROCURONIUM BROMIDE PRN MLS/HR: 10 INJECTION, SOLUTION INTRAVENOUS at 14:00

## 2020-04-23 RX ADMIN — MIDAZOLAM HYDROCHLORIDE PRN MLS/HR: 5 INJECTION, SOLUTION INTRAMUSCULAR; INTRAVENOUS at 02:35

## 2020-04-23 RX ADMIN — MORPHINE SULFATE PRN MLS/HR: 1 INJECTION, SOLUTION INTRAVENOUS at 20:53

## 2020-04-23 RX ADMIN — ENOXAPARIN SODIUM SCH: 40 INJECTION SUBCUTANEOUS at 10:31

## 2020-04-23 RX ADMIN — MIDAZOLAM HYDROCHLORIDE PRN MLS/HR: 5 INJECTION, SOLUTION INTRAMUSCULAR; INTRAVENOUS at 18:45

## 2020-04-23 RX ADMIN — ENOXAPARIN SODIUM SCH MG: 100 INJECTION SUBCUTANEOUS at 21:04

## 2020-04-23 RX ADMIN — MORPHINE SULFATE PRN MLS/HR: 1 INJECTION, SOLUTION INTRAVENOUS at 12:20

## 2020-04-23 RX ADMIN — VITAMIN D, TAB 1000IU (100/BT) SCH UNIT: 25 TAB at 10:24

## 2020-04-23 RX ADMIN — Medication SCH: at 05:36

## 2020-04-23 RX ADMIN — INSULIN HUMAN SCH UNIT: 100 INJECTION, SOLUTION PARENTERAL at 05:29

## 2020-04-23 RX ADMIN — INSULIN HUMAN SCH UNIT: 100 INJECTION, SOLUTION PARENTERAL at 10:28

## 2020-04-23 RX ADMIN — MINERAL OIL AND WHITE PETROLATUM SCH APPLIC: 150; 830 OINTMENT OPHTHALMIC at 14:07

## 2020-04-23 RX ADMIN — HYDROCORTISONE SODIUM SUCCINATE SCH MG: 100 INJECTION, POWDER, FOR SOLUTION INTRAMUSCULAR; INTRAVENOUS at 21:00

## 2020-04-23 RX ADMIN — SODIUM CHLORIDE, SODIUM LACTATE, POTASSIUM CHLORIDE, AND CALCIUM CHLORIDE PRN MLS/HR: .6; .31; .03; .02 INJECTION, SOLUTION INTRAVENOUS at 10:18

## 2020-04-23 RX ADMIN — Medication SCH MG: at 21:01

## 2020-04-23 NOTE — PDOC CRITICAL CARE PROG REPORT
General


Date:: 04/23/20


ICU Day:: 13


Ventilator Day:: 13


Hospital Day:: 18


Resuscitation Status: Full Code


Medical Power of : DaughterMaryuri


Events in the past 12 to 24 Hours:: 





Starting to drop her oxygen saturations again t low 80s. Family aware.


Review of systems relevant to events:: 





Respiratory


Reason for ICU Addmission:: Acute hypoxic respiratory failure with SARS, 2-

CoViD19. Intubated. Supine.





- Medications:


Medications reviewed and adjusted accordingly: Yes


Vasopressors:: 





None


Sedation:: 





Morphine, versed.





Physical Exam


Vital Signs: 


                                        











Temp Pulse Resp BP Pulse Ox


 


 96.6 F L  77   15   105/68   77 L


 


 04/23/20 06:09  04/22/20 20:00  04/23/20 06:09  04/23/20 06:09  04/23/20 06:09








                                 Intake & Output











 04/22/20 04/23/20 04/24/20





 06:59 06:59 06:59


 


Intake Total 2818 2602 


 


Output Total 1285 1320 


 


Balance 1533 1282 


 


Weight 115.3 kg 117.6 kg 








                                  Weight/Height





Weight                           117.6 kg


Height                           5 ft 2 in








General appearance: PRESENT: morbidly obese


Head exam: PRESENT: atraumatic, normocephalic


Eye exam: PRESENT: conjunctiva pink, EOMI, PERRLA.  ABSENT: scleral icterus


Ear exam: PRESENT: normal external ear exam


Mouth exam: PRESENT: moist, tongue midline


Respiratory exam: PRESENT: prolonged expiratory phas


Cardiovascular exam: PRESENT: RRR, tachycardia.  ABSENT: diastolic murmur, rubs,

systolic murmur


GI/Abdominal exam: PRESENT: normal bowel sounds, soft.  ABSENT: distended, 

guarding, mass, organolmegaly, rebound, tenderness


Rectal exam: PRESENT: deferred


Gentrourinary exam: PRESENT: indwelling catheter


Extremities exam: PRESENT: full ROM.  ABSENT: calf tenderness, clubbing, pedal 

edema


Musculoskeletal exam: PRESENT: normal inspection


Neurological exam: PRESENT: other - Sedated and with neuromucular blockade.


Psychiatric exam: PRESENT: agitated


Skin exam: PRESENT: dry, intact, warm, other - Small sacral decubitus..  ABSENT:

cyanosis, rash


Tubes/Lines: PRESENT: Endotracheal Tube, Central Line, Arterial Catheter, 

Nasogastic Tube





Laboratory/Radiographs


Laboratory Results: 


                                        





                                 04/22/20 04:27 





                                 04/23/20 02:48 





                                        











  04/23/20





  02:48


 


Sodium  145.8 H


 


Potassium  4.9


 


Chloride  112 H


 


Carbon Dioxide  25


 


Anion Gap  9


 


BUN  53 H


 


Creatinine  0.98


 


Est GFR (African Amer)  > 60


 


Glucose  294 H


 


Calcium  8.5








                                        











  04/05/20 04/10/20 04/10/20





  14:00 11:48 11:48


 


Creatine Kinase   105 


 


CK-MB (CK-2)    0.87


 


Troponin I    < 0.012


 


NT-Pro-B Natriuret Pep  32  














  04/10/20 04/10/20 04/11/20





  22:45 22:45 04:45


 


Creatine Kinase  60   46


 


CK-MB (CK-2)   0.41 


 


Troponin I   0.018 


 


NT-Pro-B Natriuret Pep   














  04/11/20





  04:45


 


Creatine Kinase 


 


CK-MB (CK-2)  0.47


 


Troponin I  < 0.012


 


NT-Pro-B Natriuret Pep 











Impressions: 


                                        





Chest/Abdomen CTA  04/05/20 16:08


IMPRESSION:


1. Patchy peripheral and perihilar ground-glass opacities in both lungs.  These 

are commonly reported imaging features of COVID-19   pneumonia are present. 

Other processes such as influenza pneumonia and organizing pneumonia, as can be 

seen with drug toxicity and connective tissue disease, can cause a similar 

imaging pattern. PneTyp


2. Limited evaluation of the pulmonary arteries due to contrast bolus timing.  

No large central pulmonary embolus.  Evaluation of the segmental and 

subsegmental pulmonary arteries is limited.


 








Chest X-Ray  04/17/20 00:00


IMPRESSION:


 


Little interval change


 


 


copyright 2011 [employer]- All Rights Reserved


 











All labs, radiographs, diagnostic studies and EKGs were personally reviewed: Yes


In addition, reports of radiographic and diagnostic studies were read: Yes





Assessment and Plan





- Diagnosis


(1) COVID-19 virus infection


Is this a current diagnosis for this admission?: Yes   


Plan: 


Positive and obviously not recovered.








(2) Acute hypoxemic respiratory failure


Is this a current diagnosis for this admission?: Yes   


Plan: 


This is again getting worse. If she proceeds to arrest due to refractory hypoxia

there will be no CPR due to its being futile and risk of covid transmission.








(3) T2DM (type 2 diabetes mellitus)


Qualifiers: 


   Diabetes mellitus long term insulin use: without long term use   Diabetes 

mellitus complication status: without complication   Qualified Code(s): E11.9 - 

Type 2 diabetes mellitus without complications   


Is this a current diagnosis for this admission?: Yes   


Plan: 


Getting a bit higher and may need more insuli.





Plan Summary: 





Maintain as is with maximal support. Prognosis poor.





Critical Time


Critical Time (minutes): 35


Level of Care: ICU


Anticipated discharge: Other


Within: Other


-: 


1.  The care of a critical patient is a dynamic process.  This note is a 

representative synopsis but static in nature.  The timeframe for treatments 

given in order is not necessarily the actual time these treatments may have been

done.





2.  This patient requires critical care secondary to ongoing requirements for 

therapy not offered or safe outside the critical care environment.  Transfer to 

a lower level of care will result in altered life or limb morbidity and 

mortality.





3.  Multidisciplinary rounds completed.





4.  ABCDE bundle addressed.

## 2020-04-24 RX ADMIN — MIDAZOLAM HYDROCHLORIDE PRN MLS/HR: 5 INJECTION, SOLUTION INTRAMUSCULAR; INTRAVENOUS at 05:39

## 2020-04-24 RX ADMIN — INSULIN HUMAN SCH UNIT: 100 INJECTION, SOLUTION PARENTERAL at 01:12

## 2020-04-24 RX ADMIN — ENOXAPARIN SODIUM SCH MG: 100 INJECTION SUBCUTANEOUS at 21:15

## 2020-04-24 RX ADMIN — INSULIN HUMAN SCH UNIT: 100 INJECTION, SOLUTION PARENTERAL at 14:26

## 2020-04-24 RX ADMIN — MINERAL OIL AND WHITE PETROLATUM SCH APPLIC: 150; 830 OINTMENT OPHTHALMIC at 21:16

## 2020-04-24 RX ADMIN — SODIUM CHLORIDE, SODIUM LACTATE, POTASSIUM CHLORIDE, AND CALCIUM CHLORIDE PRN MLS/HR: .6; .31; .03; .02 INJECTION, SOLUTION INTRAVENOUS at 05:39

## 2020-04-24 RX ADMIN — MIDAZOLAM HYDROCHLORIDE PRN MLS/HR: 5 INJECTION, SOLUTION INTRAMUSCULAR; INTRAVENOUS at 00:05

## 2020-04-24 RX ADMIN — Medication SCH MG: at 10:24

## 2020-04-24 RX ADMIN — MINERAL OIL AND WHITE PETROLATUM SCH APPLIC: 150; 830 OINTMENT OPHTHALMIC at 14:16

## 2020-04-24 RX ADMIN — METHOCARBAMOL SCH MG: 500 TABLET ORAL at 14:15

## 2020-04-24 RX ADMIN — MIDAZOLAM HYDROCHLORIDE PRN MLS/HR: 5 INJECTION, SOLUTION INTRAMUSCULAR; INTRAVENOUS at 09:30

## 2020-04-24 RX ADMIN — INSULIN HUMAN SCH UNIT: 100 INJECTION, SOLUTION PARENTERAL at 10:22

## 2020-04-24 RX ADMIN — Medication SCH: at 05:16

## 2020-04-24 RX ADMIN — METHOCARBAMOL SCH MG: 500 TABLET ORAL at 21:15

## 2020-04-24 RX ADMIN — METHOCARBAMOL SCH MG: 500 TABLET ORAL at 05:15

## 2020-04-24 RX ADMIN — MORPHINE SULFATE PRN MLS/HR: 1 INJECTION, SOLUTION INTRAVENOUS at 05:13

## 2020-04-24 RX ADMIN — OXYCODONE HYDROCHLORIDE AND ACETAMINOPHEN SCH MG: 500 TABLET ORAL at 10:21

## 2020-04-24 RX ADMIN — MIDAZOLAM HYDROCHLORIDE PRN MLS/HR: 5 INJECTION, SOLUTION INTRAMUSCULAR; INTRAVENOUS at 21:16

## 2020-04-24 RX ADMIN — INSULIN HUMAN SCH UNIT: 100 INJECTION, SOLUTION PARENTERAL at 05:15

## 2020-04-24 RX ADMIN — MORPHINE SULFATE PRN MLS/HR: 1 INJECTION, SOLUTION INTRAVENOUS at 19:34

## 2020-04-24 RX ADMIN — INSULIN HUMAN SCH UNIT: 100 INJECTION, SOLUTION PARENTERAL at 21:14

## 2020-04-24 RX ADMIN — Medication SCH: at 14:14

## 2020-04-24 RX ADMIN — HYDROCORTISONE SODIUM SUCCINATE SCH MG: 100 INJECTION, POWDER, FOR SOLUTION INTRAMUSCULAR; INTRAVENOUS at 05:15

## 2020-04-24 RX ADMIN — OXYCODONE HYDROCHLORIDE AND ACETAMINOPHEN SCH MG: 500 TABLET ORAL at 18:13

## 2020-04-24 RX ADMIN — HYDROCORTISONE SODIUM SUCCINATE SCH MG: 100 INJECTION, POWDER, FOR SOLUTION INTRAMUSCULAR; INTRAVENOUS at 14:15

## 2020-04-24 RX ADMIN — MIDAZOLAM HYDROCHLORIDE PRN MLS/HR: 5 INJECTION, SOLUTION INTRAMUSCULAR; INTRAVENOUS at 02:35

## 2020-04-24 RX ADMIN — HYDROCORTISONE SODIUM SUCCINATE SCH MG: 100 INJECTION, POWDER, FOR SOLUTION INTRAMUSCULAR; INTRAVENOUS at 21:15

## 2020-04-24 RX ADMIN — MINERAL OIL AND WHITE PETROLATUM SCH APPLIC: 150; 830 OINTMENT OPHTHALMIC at 05:15

## 2020-04-24 RX ADMIN — FAMOTIDINE SCH MG: 20 TABLET, FILM COATED ORAL at 21:15

## 2020-04-24 RX ADMIN — FAMOTIDINE SCH MG: 20 TABLET, FILM COATED ORAL at 10:24

## 2020-04-24 RX ADMIN — VITAMIN D, TAB 1000IU (100/BT) SCH UNIT: 25 TAB at 10:23

## 2020-04-24 RX ADMIN — ENOXAPARIN SODIUM SCH MG: 100 INJECTION SUBCUTANEOUS at 10:24

## 2020-04-24 RX ADMIN — Medication SCH: at 21:15

## 2020-04-24 RX ADMIN — Medication SCH MG: at 21:15

## 2020-04-24 RX ADMIN — INSULIN HUMAN SCH UNIT: 100 INJECTION, SOLUTION PARENTERAL at 18:15

## 2020-04-24 NOTE — PDOC CRITICAL CARE PROG REPORT
General


Date:: 04/24/20


ICU Day:: 14


Ventilator Day:: 14


Hospital Day:: 19


Resuscitation Status: Full Code


Medical Power of : Maryuri Ledesma


Events in the past 12 to 24 Hours:: 





Essentially no change.


Review of systems relevant to events:: 





Respiratory.


Reason for ICU Addmission:: Acute hypoxic respiratory failure with SARS, 

2-CoViD19. Intubated. Supine.





- Medications:


Medications reviewed and adjusted accordingly: Yes


Vasopressors:: 


None


Sedation:: 





Morphine and versed.





Physical Exam


Vital Signs: 


                                        











Temp Pulse Resp BP Pulse Ox


 


 96.6 F L  88   15   126/75 H  92 


 


 04/24/20 06:00  04/23/20 19:53  04/24/20 06:00  04/24/20 05:55  04/24/20 08:00








                                 Intake & Output











 04/23/20 04/24/20 04/25/20





 06:59 06:59 06:59


 


Intake Total 2602 2857 


 


Output Total 1320 2369 


 


Balance 1282 488 


 


Weight 117.6 kg 120.8 kg 








                                  Weight/Height





Weight                           120.8 kg


Height                           5 ft 2 in








General appearance: PRESENT: morbidly obese


Head exam: PRESENT: atraumatic, normocephalic


Eye exam: PRESENT: conjunctiva pink, EOMI, PERRLA.  ABSENT: scleral icterus


Ear exam: PRESENT: normal external ear exam


Mouth exam: PRESENT: moist, tongue midline


Respiratory exam: PRESENT: symmetrical, unlabored


Cardiovascular exam: PRESENT: RRR.  ABSENT: diastolic murmur, rubs, systolic 

murmur


Vascular exam: PRESENT: normal capillary refill


GI/Abdominal exam: PRESENT: normal bowel sounds, soft.  ABSENT: distended, 

guarding, mass, organolmegaly, rebound, tenderness


Rectal exam: PRESENT: deferred


Gentrourinary exam: PRESENT: indwelling catheter


Extremities exam: PRESENT: full ROM.  ABSENT: calf tenderness, clubbing, pedal 

edema


Musculoskeletal exam: PRESENT: normal inspection


Neurological exam: PRESENT: other - Sedated


Skin exam: PRESENT: dry, intact, warm.  ABSENT: cyanosis, rash


Tubes/Lines: PRESENT: Endotracheal Tube, Central Line, Arterial Catheter, 

Nasogastic Tube





Laboratory/Radiographs


Laboratory Results: 


                                        





                                 04/22/20 04:27 





                                 04/23/20 02:48 





                                        











  04/05/20 04/10/20 04/10/20





  14:00 11:48 11:48


 


Creatine Kinase   105 


 


CK-MB (CK-2)    0.87


 


Troponin I    < 0.012


 


NT-Pro-B Natriuret Pep  32  














  04/10/20 04/10/20 04/11/20





  22:45 22:45 04:45


 


Creatine Kinase  60   46


 


CK-MB (CK-2)   0.41 


 


Troponin I   0.018 


 


NT-Pro-B Natriuret Pep   














  04/11/20





  04:45


 


Creatine Kinase 


 


CK-MB (CK-2)  0.47


 


Troponin I  < 0.012


 


NT-Pro-B Natriuret Pep 











Impressions: 


                                        





Chest/Abdomen CTA  04/05/20 16:08


IMPRESSION:


1. Patchy peripheral and perihilar ground-glass opacities in both lungs.  These 

are commonly reported imaging features of COVID-19   pneumonia are present. 

Other processes such as influenza pneumonia and organizing pneumonia, as can be 

seen with drug toxicity and connective tissue disease, can cause a similar 

imaging pattern. PneTyp


2. Limited evaluation of the pulmonary arteries due to contrast bolus timing.  

No large central pulmonary embolus.  Evaluation of the segmental and 

subsegmental pulmonary arteries is limited.


 








Chest X-Ray  04/17/20 00:00


IMPRESSION:


 


Little interval change


 


 


copyright 2011 [employer]- All Rights Reserved


 











All labs, radiographs, diagnostic studies and EKGs were personally reviewed: Yes


In addition, reports of radiographic and diagnostic studies were read: Yes





Assessment and Plan





- Diagnosis


(1) COVID-19 virus infection


Is this a current diagnosis for this admission?: Yes   


Plan: 


Positive








(2) Acute hypoxemic respiratory failure


Is this a current diagnosis for this admission?: Yes   


Plan: 


She has not really rallied, improved or decompensated. She has been unchanged 

for several days.








(3) T2DM (type 2 diabetes mellitus)


Qualifiers: 


   Diabetes mellitus long term insulin use: without long term use   Diabetes 

mellitus complication status: without complication   Qualified Code(s): E11.9 - 

Type 2 diabetes mellitus without complications   


Is this a current diagnosis for this admission?: Yes   


Plan: 


Controlled.





Plan Summary: 





Maintain as is for now.





Critical Time


Critical Time (minutes): 35


Level of Care: ICU


Anticipated discharge: SNF


Within: Other


-: 


1.  The care of a critical patient is a dynamic process.  This note is a 

representative synopsis but static in nature.  The timeframe for treatments 

given in order is not necessarily the actual time these treatments may have been

done.





2.  This patient requires critical care secondary to ongoing requirements for 

therapy not offered or safe outside the critical care environment.  Transfer to 

a lower level of care will result in altered life or limb morbidity and 

mortality.





3.  Multidisciplinary rounds completed.





4.  ABCDE bundle addressed.

## 2020-04-25 LAB
ABSOLUTE LYMPHOCYTES# (MANUAL): 0.1 10^3/UL (ref 0.5–4.7)
ABSOLUTE MONOCYTES # (MANUAL): 0.6 10^3/UL (ref 0.1–1.4)
ADD MANUAL DIFF: YES
ANION GAP SERPL CALC-SCNC: 3 MMOL/L (ref 5–19)
ANISOCYTOSIS BLD QL SMEAR: (no result)
APPEARANCE UR: (no result)
APTT PPP: YELLOW S
ARTERIAL BLOOD FIO2: (no result)
ARTERIAL BLOOD H2CO3: 2.21 MMOL/L (ref 1.05–1.35)
ARTERIAL BLOOD HCO3: 29 MMOL/L (ref 20–24)
ARTERIAL BLOOD PCO2: 73.3 MMHG (ref 35–45)
ARTERIAL BLOOD PH: 7.22 (ref 7.35–7.45)
ARTERIAL BLOOD PO2: 59.3 MMHG (ref 80–100)
ARTERIAL BLOOD TOTAL CO2: 31.2 MMOL/L (ref 21–25)
BASE EXCESS BLDA CALC-SCNC: -0.1 MMOL/L
BASO STIPL BLD QL SMEAR: PRESENT
BASOPHILS NFR BLD MANUAL: 0 % (ref 0–2)
BILIRUB UR QL STRIP: NEGATIVE
BUN SERPL-MCNC: 50 MG/DL (ref 7–20)
CALCIUM: 8.1 MG/DL (ref 8.4–10.2)
CHLORIDE SERPL-SCNC: 115 MMOL/L (ref 98–107)
CO2 SERPL-SCNC: 30 MMOL/L (ref 22–30)
DACRYOCYTES BLD QL SMEAR: SLIGHT
EOSINOPHIL NFR BLD MANUAL: 0 % (ref 0–6)
ERYTHROCYTE [DISTWIDTH] IN BLOOD BY AUTOMATED COUNT: 16.4 % (ref 11.5–14)
GLUCOSE SERPL-MCNC: 229 MG/DL (ref 75–110)
GLUCOSE UR STRIP-MCNC: NEGATIVE MG/DL
HCT VFR BLD CALC: 24.9 % (ref 36–47)
HGB BLD-MCNC: 8.3 G/DL (ref 12–15.5)
KETONES UR STRIP-MCNC: NEGATIVE MG/DL
MCH RBC QN AUTO: 29.6 PG (ref 27–33.4)
MCHC RBC AUTO-ENTMCNC: 33.5 G/DL (ref 32–36)
MCV RBC AUTO: 88 FL (ref 80–97)
MONOCYTES % (MANUAL): 5 % (ref 3–13)
NEUTS BAND NFR BLD MANUAL: 1 % (ref 3–5)
NRBC BLD AUTO-RTO: 4 /100 WBC
OVALOCYTES BLD QL SMEAR: (no result)
PH UR STRIP: 5 [PH] (ref 5–9)
PLATELET # BLD: 106 10^3/UL (ref 150–450)
PLATELET COMMENT: ADEQUATE
POIKILOCYTOSIS BLD QL SMEAR: (no result)
POLYCHROMASIA BLD QL SMEAR: SLIGHT
POTASSIUM SERPL-SCNC: 4.7 MMOL/L (ref 3.6–5)
PROT UR STRIP-MCNC: NEGATIVE MG/DL
RBC # BLD AUTO: 2.81 10^6/UL (ref 3.72–5.28)
SAO2 % BLDA: 84.1 % (ref 94–98)
SCHISTOCYTES BLD QL SMEAR: SLIGHT
SEGMENTED NEUTROPHILS % (MAN): 93 % (ref 42–78)
SP GR UR STRIP: 1.01
TOTAL CELLS COUNTED BLD: 100
TOXIC GRANULES BLD QL SMEAR: (no result)
UROBILINOGEN UR-MCNC: NEGATIVE MG/DL (ref ?–2)
VARIANT LYMPHS NFR BLD MANUAL: 1 % (ref 13–45)
WBC # BLD AUTO: 11 10^3/UL (ref 4–10.5)

## 2020-04-25 RX ADMIN — INSULIN HUMAN SCH: 100 INJECTION, SOLUTION PARENTERAL at 21:18

## 2020-04-25 RX ADMIN — INSULIN HUMAN SCH: 100 INJECTION, SOLUTION PARENTERAL at 17:27

## 2020-04-25 RX ADMIN — MIDAZOLAM HYDROCHLORIDE PRN MLS/HR: 5 INJECTION, SOLUTION INTRAMUSCULAR; INTRAVENOUS at 13:40

## 2020-04-25 RX ADMIN — MIDAZOLAM HYDROCHLORIDE PRN MLS/HR: 5 INJECTION, SOLUTION INTRAMUSCULAR; INTRAVENOUS at 04:26

## 2020-04-25 RX ADMIN — FAMOTIDINE SCH MG: 20 TABLET, FILM COATED ORAL at 11:25

## 2020-04-25 RX ADMIN — Medication SCH: at 17:29

## 2020-04-25 RX ADMIN — Medication SCH: at 21:25

## 2020-04-25 RX ADMIN — INSULIN GLARGINE SCH UNIT: 100 INJECTION, SOLUTION SUBCUTANEOUS at 17:41

## 2020-04-25 RX ADMIN — INSULIN HUMAN SCH UNIT: 100 INJECTION, SOLUTION PARENTERAL at 05:17

## 2020-04-25 RX ADMIN — MORPHINE SULFATE PRN MLS/HR: 1 INJECTION, SOLUTION INTRAVENOUS at 05:18

## 2020-04-25 RX ADMIN — INSULIN HUMAN SCH UNIT: 100 INJECTION, SOLUTION PARENTERAL at 11:21

## 2020-04-25 RX ADMIN — MINERAL OIL AND WHITE PETROLATUM SCH APPLIC: 150; 830 OINTMENT OPHTHALMIC at 21:18

## 2020-04-25 RX ADMIN — Medication SCH: at 05:45

## 2020-04-25 RX ADMIN — Medication SCH MG: at 21:19

## 2020-04-25 RX ADMIN — Medication SCH MG: at 11:25

## 2020-04-25 RX ADMIN — ENOXAPARIN SODIUM SCH MG: 100 INJECTION SUBCUTANEOUS at 21:24

## 2020-04-25 RX ADMIN — VITAMIN D, TAB 1000IU (100/BT) SCH UNIT: 25 TAB at 11:25

## 2020-04-25 RX ADMIN — INSULIN HUMAN SCH UNIT: 100 INJECTION, SOLUTION PARENTERAL at 01:00

## 2020-04-25 RX ADMIN — FAMOTIDINE SCH MG: 20 TABLET, FILM COATED ORAL at 21:19

## 2020-04-25 RX ADMIN — OXYCODONE HYDROCHLORIDE AND ACETAMINOPHEN SCH MG: 500 TABLET ORAL at 11:25

## 2020-04-25 RX ADMIN — OXYCODONE HYDROCHLORIDE AND ACETAMINOPHEN SCH MG: 500 TABLET ORAL at 17:41

## 2020-04-25 RX ADMIN — INSULIN HUMAN SCH UNIT: 100 INJECTION, SOLUTION PARENTERAL at 17:27

## 2020-04-25 RX ADMIN — HYDROCORTISONE SODIUM SUCCINATE SCH MG: 100 INJECTION, POWDER, FOR SOLUTION INTRAMUSCULAR; INTRAVENOUS at 05:18

## 2020-04-25 RX ADMIN — HYDROCORTISONE SODIUM SUCCINATE SCH MG: 100 INJECTION, POWDER, FOR SOLUTION INTRAMUSCULAR; INTRAVENOUS at 17:31

## 2020-04-25 RX ADMIN — SODIUM CHLORIDE, SODIUM LACTATE, POTASSIUM CHLORIDE, AND CALCIUM CHLORIDE PRN MLS/HR: .6; .31; .03; .02 INJECTION, SOLUTION INTRAVENOUS at 01:05

## 2020-04-25 RX ADMIN — METHOCARBAMOL SCH MG: 500 TABLET ORAL at 05:18

## 2020-04-25 RX ADMIN — MIDAZOLAM HYDROCHLORIDE PRN MLS/HR: 5 INJECTION, SOLUTION INTRAMUSCULAR; INTRAVENOUS at 00:21

## 2020-04-25 RX ADMIN — METHOCARBAMOL SCH MG: 500 TABLET ORAL at 17:31

## 2020-04-25 RX ADMIN — MIDAZOLAM HYDROCHLORIDE PRN MLS/HR: 5 INJECTION, SOLUTION INTRAMUSCULAR; INTRAVENOUS at 19:20

## 2020-04-25 RX ADMIN — MIDAZOLAM HYDROCHLORIDE PRN MLS/HR: 5 INJECTION, SOLUTION INTRAMUSCULAR; INTRAVENOUS at 08:38

## 2020-04-25 RX ADMIN — INSULIN GLARGINE SCH UNIT: 100 INJECTION, SOLUTION SUBCUTANEOUS at 11:22

## 2020-04-25 RX ADMIN — HYDROCORTISONE SODIUM SUCCINATE SCH MG: 100 INJECTION, POWDER, FOR SOLUTION INTRAMUSCULAR; INTRAVENOUS at 21:24

## 2020-04-25 RX ADMIN — MINERAL OIL AND WHITE PETROLATUM SCH APPLIC: 150; 830 OINTMENT OPHTHALMIC at 05:18

## 2020-04-25 RX ADMIN — MINERAL OIL AND WHITE PETROLATUM SCH APPLIC: 150; 830 OINTMENT OPHTHALMIC at 17:28

## 2020-04-25 RX ADMIN — METHOCARBAMOL SCH MG: 500 TABLET ORAL at 21:19

## 2020-04-25 RX ADMIN — ENOXAPARIN SODIUM SCH MG: 100 INJECTION SUBCUTANEOUS at 11:23

## 2020-04-25 RX ADMIN — MORPHINE SULFATE PRN MLS/HR: 1 INJECTION, SOLUTION INTRAVENOUS at 13:45

## 2020-04-25 NOTE — PDOC CRITICAL CARE PROG REPORT
General


Date:: 04/25/20


ICU Day:: 15


Ventilator Day:: 15


Hospital Day:: 20


Resuscitation Status: Full Code


Medical Power of : DaughterMaryuri


Events in the past 12 to 24 Hours:: 





Able to decrease FiO2 to 75%


Review of systems relevant to events:: 





Pulmonary


Reason for ICU Addmission:: Acute hypoxic respiratory failure with SARS, 2-CoV

iD19. Intubated. Supine.





- Medications:


Medications reviewed and adjusted accordingly: Yes


Vasopressors:: 





None


Sedation:: 





Morphine, Versed.





Physical Exam


Vital Signs: 


                                        











Temp Pulse Resp BP Pulse Ox


 


 97.7 F   104 H  16   140/81 H  88 L


 


 04/25/20 06:00  04/24/20 19:41  04/25/20 06:00  04/25/20 05:40  04/25/20 08:35








                                 Intake & Output











 04/24/20 04/25/20 04/26/20





 06:59 06:59 06:59


 


Intake Total 2857 2557 


 


Output Total 2369 1670 225


 


Balance 488 887 -225


 


Weight 120.8 kg 121.2 kg 








                                  Weight/Height





Weight                           121.2 kg


Height                           5 ft 3 in








General appearance: PRESENT: no acute distress


Head exam: PRESENT: atraumatic, normocephalic


Eye exam: PRESENT: conjunctiva pink, EOMI, PERRLA.  ABSENT: scleral icterus


Ear exam: PRESENT: normal external ear exam


Mouth exam: PRESENT: moist, tongue midline


Respiratory exam: PRESENT: symmetrical, unlabored


Cardiovascular exam: PRESENT: RRR.  ABSENT: diastolic murmur, rubs, systolic 

murmur


GI/Abdominal exam: PRESENT: normal bowel sounds, soft.  ABSENT: distended, 

guarding, mass, organolmegaly, rebound, tenderness


Rectal exam: PRESENT: deferred


Extremities exam: PRESENT: full ROM.  ABSENT: calf tenderness, clubbing, pedal e

donavon


Neurological exam: PRESENT: motor sensory deficit, other - Sedated.


Skin exam: PRESENT: dry, intact, warm.  ABSENT: cyanosis, rash


Tubes/Lines: PRESENT: Endotracheal Tube, Central Line, Arterial Catheter, 

Nasogastic Tube





Laboratory/Radiographs


Laboratory Results: 


                                        





                                 04/25/20 03:09 





                                 04/25/20 03:09 





                                        











  04/25/20 04/25/20 04/25/20





  03:09 03:09 08:45


 


WBC   11.0 H 


 


RBC   2.81 L 


 


Hgb   8.3 L 


 


Hct   24.9 L 


 


MCV   88 


 


MCH   29.6 


 


MCHC   33.5 


 


RDW   16.4 H 


 


Plt Count   106 L 


 


Seg Neutrophils %   Not Reportable 


 


Carbonic Acid    2.21 H


 


HCO3/H2CO3 Ratio    13:1


 


ABG pH    7.22 L


 


ABG pCO2    73.3 H*


 


ABG pO2    59.3 L


 


ABG HCO3    29.0 H


 


ABG O2 Saturation    84.1 L


 


ABG Base Excess    -0.1


 


FiO2    75%


 


Sodium  147.9 H  


 


Potassium  4.7  


 


Chloride  115 H  


 


Carbon Dioxide  30  


 


Anion Gap  3 L  


 


BUN  50 H  


 


Creatinine  0.83  


 


Est GFR ( Amer)  > 60  


 


Glucose  229 H  


 


Calcium  8.1 L  








                                        











  04/05/20 04/10/20 04/10/20





  14:00 11:48 11:48


 


Creatine Kinase   105 


 


CK-MB (CK-2)    0.87


 


Troponin I    < 0.012


 


NT-Pro-B Natriuret Pep  32  














  04/10/20 04/10/20 04/11/20





  22:45 22:45 04:45


 


Creatine Kinase  60   46


 


CK-MB (CK-2)   0.41 


 


Troponin I   0.018 


 


NT-Pro-B Natriuret Pep   














  04/11/20





  04:45


 


Creatine Kinase 


 


CK-MB (CK-2)  0.47


 


Troponin I  < 0.012


 


NT-Pro-B Natriuret Pep 











Impressions: 


                                        





Chest/Abdomen CTA  04/05/20 16:08


IMPRESSION:


1. Patchy peripheral and perihilar ground-glass opacities in both lungs.  These 

are commonly reported imaging features of COVID-19   pneumonia are present. 

Other processes such as influenza pneumonia and organizing pneumonia, as can be 

seen with drug toxicity and connective tissue disease, can cause a similar 

imaging pattern. PneTyp


2. Limited evaluation of the pulmonary arteries due to contrast bolus timing.  

No large central pulmonary embolus.  Evaluation of the segmental and 

subsegmental pulmonary arteries is limited.


 








Chest X-Ray  04/17/20 00:00


IMPRESSION:


 


Little interval change


 


 


copyright 2011 [employer]- All Rights Reserved


 











All labs, radiographs, diagnostic studies and EKGs were personally reviewed: Yes


In addition, reports of radiographic and diagnostic studies were read: Yes





Assessment and Plan





- Diagnosis


(1) COVID-19 virus infection


Is this a current diagnosis for this admission?: Yes   


Plan: 


Positive. It seems as though her status is improving slightly. We will continue 

to decrease FiO2 as tolerated.








(2) Acute hypoxemic respiratory failure


Is this a current diagnosis for this admission?: Yes   


Plan: 


Improving but way are far from extubation.








(3) T2DM (type 2 diabetes mellitus)


Qualifiers: 


   Diabetes mellitus long term insulin use: without long term use   Diabetes 

mellitus complication status: without complication   Qualified Code(s): E11.9 - 

Type 2 diabetes mellitus without complications   


Is this a current diagnosis for this admission?: Yes   


Plan: 


We are decreasing steroids and may need to back off insulin.








(4) Hypernatremia


Is this a current diagnosis for this admission?: Yes   


Plan: 


Level of Na only 147 but will increased flushes in TF.





Plan Summary: 





Continue to decrease FiO2 as tolerated. Off Nicardipine and Rocuronium.





Critical Time


Critical Time (minutes): 35


Level of Care: ICU


Anticipated discharge: SNF


Within: Other


-: 


1.  The care of a critical patient is a dynamic process.  This note is a re

presentative synopsis but static in nature.  The timeframe for treatments given 

in order is not necessarily the actual time these treatments may have been done.





2.  This patient requires critical care secondary to ongoing requirements for 

therapy not offered or safe outside the critical care environment.  Transfer to 

a lower level of care will result in altered life or limb morbidity and mortalit

y.





3.  Multidisciplinary rounds completed.





4.  ABCDE bundle addressed.

## 2020-04-26 LAB
ABSOLUTE LYMPHOCYTES# (MANUAL): 0.5 10^3/UL (ref 0.5–4.7)
ABSOLUTE MONOCYTES # (MANUAL): 0.3 10^3/UL (ref 0.1–1.4)
ADD MANUAL DIFF: YES
ALBUMIN SERPL-MCNC: 2.7 G/DL (ref 3.5–5)
ALP SERPL-CCNC: 116 U/L (ref 38–126)
ANION GAP SERPL CALC-SCNC: 0 MMOL/L (ref 5–19)
ANISOCYTOSIS BLD QL SMEAR: (no result)
AST SERPL-CCNC: 41 U/L (ref 14–36)
BASOPHILS NFR BLD MANUAL: 0 % (ref 0–2)
BILIRUB DIRECT SERPL-MCNC: 0 MG/DL (ref 0–0.4)
BILIRUB SERPL-MCNC: 0.7 MG/DL (ref 0.2–1.3)
BUN SERPL-MCNC: 44 MG/DL (ref 7–20)
CALCIUM: 8.1 MG/DL (ref 8.4–10.2)
CHLORIDE SERPL-SCNC: 113 MMOL/L (ref 98–107)
CO2 SERPL-SCNC: 35 MMOL/L (ref 22–30)
EOSINOPHIL NFR BLD MANUAL: 0 % (ref 0–6)
ERYTHROCYTE [DISTWIDTH] IN BLOOD BY AUTOMATED COUNT: 17.2 % (ref 11.5–14)
GLUCOSE SERPL-MCNC: 133 MG/DL (ref 75–110)
HCT VFR BLD CALC: 25.8 % (ref 36–47)
HGB BLD-MCNC: 8.7 G/DL (ref 12–15.5)
MCH RBC QN AUTO: 29.5 PG (ref 27–33.4)
MCHC RBC AUTO-ENTMCNC: 33.7 G/DL (ref 32–36)
MCV RBC AUTO: 88 FL (ref 80–97)
MONOCYTES % (MANUAL): 3 % (ref 3–13)
NEUTS BAND NFR BLD MANUAL: 4 % (ref 3–5)
PLATELET # BLD: 107 10^3/UL (ref 150–450)
PLATELET COMMENT: ADEQUATE
POLYCHROMASIA BLD QL SMEAR: (no result)
POTASSIUM SERPL-SCNC: 4 MMOL/L (ref 3.6–5)
PROT SERPL-MCNC: 5.1 G/DL (ref 6.3–8.2)
RBC # BLD AUTO: 2.95 10^6/UL (ref 3.72–5.28)
SEGMENTED NEUTROPHILS % (MAN): 89 % (ref 42–78)
TOTAL CELLS COUNTED BLD: 100
VARIANT LYMPHS NFR BLD MANUAL: 4 % (ref 13–45)
WBC # BLD AUTO: 11.3 10^3/UL (ref 4–10.5)

## 2020-04-26 PROCEDURE — 05HM33Z INSERTION OF INFUSION DEVICE INTO RIGHT INTERNAL JUGULAR VEIN, PERCUTANEOUS APPROACH: ICD-10-PCS | Performed by: RADIOLOGY

## 2020-04-26 RX ADMIN — MINERAL OIL AND WHITE PETROLATUM SCH: 150; 830 OINTMENT OPHTHALMIC at 22:30

## 2020-04-26 RX ADMIN — INSULIN HUMAN SCH: 100 INJECTION, SOLUTION PARENTERAL at 05:20

## 2020-04-26 RX ADMIN — INSULIN HUMAN SCH: 100 INJECTION, SOLUTION PARENTERAL at 17:43

## 2020-04-26 RX ADMIN — INSULIN GLARGINE SCH UNIT: 100 INJECTION, SOLUTION SUBCUTANEOUS at 09:48

## 2020-04-26 RX ADMIN — FAMOTIDINE SCH MG: 20 TABLET, FILM COATED ORAL at 21:14

## 2020-04-26 RX ADMIN — INSULIN HUMAN SCH: 100 INJECTION, SOLUTION PARENTERAL at 09:44

## 2020-04-26 RX ADMIN — METHOCARBAMOL SCH MG: 500 TABLET ORAL at 14:14

## 2020-04-26 RX ADMIN — MIDAZOLAM HYDROCHLORIDE PRN MLS/HR: 5 INJECTION, SOLUTION INTRAMUSCULAR; INTRAVENOUS at 02:22

## 2020-04-26 RX ADMIN — INSULIN HUMAN SCH UNIT: 100 INJECTION, SOLUTION PARENTERAL at 14:14

## 2020-04-26 RX ADMIN — NICARDIPINE HYDROCHLORIDE PRN MLS/HR: 0.1 INJECTION, SOLUTION INTRAVENOUS at 22:45

## 2020-04-26 RX ADMIN — METHOCARBAMOL SCH MG: 500 TABLET ORAL at 21:14

## 2020-04-26 RX ADMIN — Medication SCH: at 14:05

## 2020-04-26 RX ADMIN — MIDAZOLAM HYDROCHLORIDE PRN MLS/HR: 5 INJECTION, SOLUTION INTRAMUSCULAR; INTRAVENOUS at 13:52

## 2020-04-26 RX ADMIN — OXYCODONE HYDROCHLORIDE AND ACETAMINOPHEN SCH MG: 500 TABLET ORAL at 09:43

## 2020-04-26 RX ADMIN — Medication SCH: at 21:15

## 2020-04-26 RX ADMIN — MINERAL OIL AND WHITE PETROLATUM SCH APPLIC: 150; 830 OINTMENT OPHTHALMIC at 05:17

## 2020-04-26 RX ADMIN — HYDROCORTISONE SODIUM SUCCINATE SCH MG: 100 INJECTION, POWDER, FOR SOLUTION INTRAMUSCULAR; INTRAVENOUS at 21:15

## 2020-04-26 RX ADMIN — INSULIN HUMAN SCH: 100 INJECTION, SOLUTION PARENTERAL at 04:25

## 2020-04-26 RX ADMIN — METHOCARBAMOL SCH MG: 500 TABLET ORAL at 05:17

## 2020-04-26 RX ADMIN — INSULIN HUMAN SCH: 100 INJECTION, SOLUTION PARENTERAL at 21:15

## 2020-04-26 RX ADMIN — ENOXAPARIN SODIUM SCH: 100 INJECTION SUBCUTANEOUS at 09:43

## 2020-04-26 RX ADMIN — ENOXAPARIN SODIUM SCH MG: 100 INJECTION SUBCUTANEOUS at 21:14

## 2020-04-26 RX ADMIN — HYDROCORTISONE SODIUM SUCCINATE SCH MG: 100 INJECTION, POWDER, FOR SOLUTION INTRAMUSCULAR; INTRAVENOUS at 14:23

## 2020-04-26 RX ADMIN — MIDAZOLAM HYDROCHLORIDE PRN MLS/HR: 5 INJECTION, SOLUTION INTRAMUSCULAR; INTRAVENOUS at 17:35

## 2020-04-26 RX ADMIN — MORPHINE SULFATE PRN MLS/HR: 1 INJECTION, SOLUTION INTRAVENOUS at 02:22

## 2020-04-26 RX ADMIN — MINERAL OIL AND WHITE PETROLATUM SCH APPLIC: 150; 830 OINTMENT OPHTHALMIC at 14:21

## 2020-04-26 RX ADMIN — INSULIN GLARGINE SCH UNIT: 100 INJECTION, SOLUTION SUBCUTANEOUS at 17:42

## 2020-04-26 RX ADMIN — Medication SCH: at 05:17

## 2020-04-26 RX ADMIN — FAMOTIDINE SCH MG: 20 TABLET, FILM COATED ORAL at 09:43

## 2020-04-26 RX ADMIN — HYDROCORTISONE SODIUM SUCCINATE SCH MG: 100 INJECTION, POWDER, FOR SOLUTION INTRAMUSCULAR; INTRAVENOUS at 05:17

## 2020-04-26 RX ADMIN — Medication SCH MG: at 09:43

## 2020-04-26 RX ADMIN — VITAMIN D, TAB 1000IU (100/BT) SCH UNIT: 25 TAB at 09:43

## 2020-04-26 RX ADMIN — MORPHINE SULFATE PRN MLS/HR: 1 INJECTION, SOLUTION INTRAVENOUS at 14:13

## 2020-04-26 RX ADMIN — OXYCODONE HYDROCHLORIDE AND ACETAMINOPHEN SCH MG: 500 TABLET ORAL at 17:42

## 2020-04-26 NOTE — PDOC CONSULTATION
Consultation


Consult Date: 04/26/20


Provider Consulted: MARÍA ELENA CARDONA


Consult reason:: Asystole





History of Present Illness


Admission Date/PCP: 


  04/05/20 17:47





  DIANA RIVERA MD





History of Present Illness: 


SAROJ SORIANO is a 70 year old female with history of obesity, hypertension, 

hyperlipidemia, diabetes mellitus who is consulted to our service for further 

evaluation and recommendations after the patient has developed episodes of 

asystole.  The patient was admitted to the intensive care unit 15 days ago with 

COVID-19 infection requiring intubation and high flow oxygen.  Unfortunately she

has continued to be hypoxemic despite mechanical ventilation with an FiO2 of 

100%.  She had been relatively stable until earlier today when she was noted to 

have episodes of asystole lasting several seconds at a time associated with 

hypotension.  The patient is currently intubated and on isolation therefore a 

physical exam is not possible at this time.








Past Medical History


Cardiac Medical History: Reports: Hypertension


Pulmonary Medical History: Reports: Asthma


Endocrine Medical History: Reports: Diabetes Mellitus Type 2


Psychiatric Medical History: 


   Denies: Depression





Past Surgical History


Past Surgical History: Reports: None





Social History


Lives with: Alone


Smoking Status: Never Smoker


Electronic Cigarette use?: No


Frequency of Alcohol Use: None


Hx Recreational Drug Use: No


Hx Prescription Drug Abuse: No





- Advance Directive


Resuscitation Status: Full Code





Family History


Family History: Reviewed & Not Pertinent, CVA, Malignancy


Parental Family History Reviewed: Yes


Children Family History Reviewed: Yes


Sibling(s) Family History Reviewed.: Yes





Medication/Allergy


Home Medications: 








Losartan Potassium 50 mg PO DAILY 11/01/19 








Allergies/Adverse Reactions: 


                                        





Penicillins Adverse Reaction (Verified 11/01/19 21:24)


   











Physical Exam


Vital Signs: 


                                        











Temp Pulse Resp BP Pulse Ox


 


 97.0 F   84   15   157/89 H  93 


 


 04/26/20 14:00  04/26/20 14:00  04/26/20 14:00  04/26/20 14:00  04/26/20 14:00








                                 Intake & Output











 04/25/20 04/26/20 04/27/20





 06:59 06:59 06:59


 


Intake Total 2557 1240 520


 


Output Total 1670 3500 208


 


Balance 887 -2260 312


 


Weight 121.2 kg 122.2 kg 














Results


Laboratory Results: 


                                        





                                 04/26/20 05:30 





                                 04/26/20 05:30 





                                        











  04/25/20 04/26/20 04/26/20





  20:10 05:30 05:30


 


WBC   11.3 H 


 


RBC   2.95 L 


 


Hgb   8.7 L 


 


Hct   25.8 L 


 


MCV   88 


 


MCH   29.5 


 


MCHC   33.7 


 


RDW   17.2 H 


 


Plt Count   107 L 


 


Seg Neutrophils %   Not Reportable 


 


Sodium    147.9 H


 


Potassium    4.0


 


Chloride    113 H


 


Carbon Dioxide    35 H


 


Anion Gap    0 L


 


BUN    44 H


 


Creatinine    0.78


 


Est GFR ( Amer)    > 60


 


Glucose    133 H


 


Calcium    8.1 L


 


Total Bilirubin    0.7


 


AST    41 H


 


Alkaline Phosphatase    116


 


Total Protein    5.1 L


 


Albumin    2.7 L


 


Urine Color  YELLOW  


 


Urine Appearance  CLOUDY  


 


Urine pH  5.0  


 


Ur Specific Gravity  1.006  


 


Urine Protein  NEGATIVE  


 


Urine Glucose (UA)  NEGATIVE  


 


Urine Ketones  NEGATIVE  


 


Urine Blood  MODERATE H  


 


Urine RBC (Auto)  102  








                                        











  04/05/20 04/10/20 04/10/20





  14:00 11:48 11:48


 


Creatine Kinase   105 


 


CK-MB (CK-2)    0.87


 


Troponin I    < 0.012


 


NT-Pro-B Natriuret Pep  32  














  04/10/20 04/10/20 04/11/20





  22:45 22:45 04:45


 


Creatine Kinase  60   46


 


CK-MB (CK-2)   0.41 


 


Troponin I   0.018 


 


NT-Pro-B Natriuret Pep   














  04/11/20





  04:45


 


Creatine Kinase 


 


CK-MB (CK-2)  0.47


 


Troponin I  < 0.012


 


NT-Pro-B Natriuret Pep 











Impressions: 


                                        





Chest/Abdomen CTA  04/05/20 16:08


IMPRESSION:


1. Patchy peripheral and perihilar ground-glass opacities in both lungs.  These 

are commonly reported imaging features of COVID-19   pneumonia are present. O

ther processes such as influenza pneumonia and organizing pneumonia, as can be 

seen with drug toxicity and connective tissue disease, can cause a similar 

imaging pattern. PneTyp


2. Limited evaluation of the pulmonary arteries due to contrast bolus timing.  

No large central pulmonary embolus.  Evaluation of the segmental and 

subsegmental pulmonary arteries is limited.


 








Chest X-Ray  04/17/20 00:00


IMPRESSION:


 


Little interval change


 


 


copyright 2011 [employer]- All Rights Reserved


 














Assessment & Plan





- Diagnosis


(1) Asystole


Is this a current diagnosis for this admission?: Yes   


Plan: 


Very unfortunate 70-year-old with multiple medical problems who had been in 

intensive care unit, intubated, in respiratory failure secondary to multifocal 

pneumonia secondary to COVID-19 who is now developing episodes of asystole.  I 

personally review the EKGs and are available rhythm strips.  She definitely has 

evidence of asystole lasting several seconds with spontaneous resolution and 

there were at least 2 episodes where she had a very slow ventricular escape 

rhythm.  Unfortunately her cardiac dysrhythmia is secondary to her overall 

clinical condition and specifically COVID-19 infection.  From the cardiovascular

standpoint, the patient is very sick and it is recommended that her dysrhythmia 

be treated with treatment of the underlying condition as well and supportive 

cardiac measures.





Recommendations:


-Atropine as needed.


-If the patient does not respond to atropine may use Isuprel.


-As a last resort, transvenous pacemaker can also be used if the patient not 

responsive to above measures.


-At this time cardiology does not have further recommendations therefore we will

sign off the case.  Please call [phone] with questions or concerns.

## 2020-04-26 NOTE — EKG REPORT
SEVERITY:- ABNORMAL ECG -

SINUS TACHYCARDIA

VENTRICULAR PREMATURE COMPLEX

LEFT ANTERIOR FASCICULAR BLOCK

BORDERLINE T ABNORMALITIES, DIFFUSE LEADS

:

Confirmed by: Raffaele Shea 26-Apr-2020 23:18:51

## 2020-04-26 NOTE — RADIOLOGY REPORT (SQ)
EXAM DESCRIPTION:  CHEST SINGLE VIEW



IMAGES COMPLETED DATE/TIME:  4/26/2020 6:49 pm



REASON FOR STUDY:  Cordis Central Line Placement



COMPARISON:  4/17/2020.



EXAM PARAMETERS:  NUMBER OF VIEWS: One view.

TECHNIQUE: Single frontal radiographic view of the chest acquired.

RADIATION DOSE: NA

LIMITATIONS: None.



FINDINGS:  LUNGS AND PLEURA: Patchy scattered airspace disease, unchanged.  No pneumothorax.

MEDIASTINUM AND HILAR STRUCTURES: No masses.  Contour normal.

HEART AND VASCULAR STRUCTURES: Heart normal in size.  Normal vasculature.

BONES: No acute findings.

HARDWARE: New right-sided central venous catheter.  Stable endotracheal tube, nasogastric tube, and p
revious central line.

OTHER: No other significant finding.



IMPRESSION:  NO PNEUMOTHORAX FOLLOWING CENTRAL LINE PLACEMENT.  OVERALL NO SIGNIFICANT INTERVAL BARGER
E IN APPEARANCE OF THE CHEST.



TECHNICAL DOCUMENTATION:  JOB ID:  [id number]

 2011 [employer]- All Rights Reserved



Reading location - IP/workstation name: [email or web address]

## 2020-04-26 NOTE — OPERATIVE REPORT
Bedside Procedure





- History of Present Illness


History of Present Illness: 





Procedure: Cordis introducer placement


Indication:


Procedure :


Attending physician:





Consent:


Consent was obtained from patients daughter prior to the procedure.  

Indications, risks, and benefits were explained and all questions were ad

dressed.


Procedure summary:


The [facility] [place] central line insertion practice form was completed by RN.


A timeout was performed.  My hands were washed immediately prior to the 

procedure.  I wore surgical cap, mask with protective eyewear, full gown and 

sterile gloves throughout the procedure.  The patient was placed in 

Trendelenburg position. RIGHT chest region was prepped using chlorhexidine scrub

and draped in sterile fashion using a full drape.  Sterile probe cover was 

placed on ultrasound probe.  The medial and lateral heads of the 

sternocleidomastoid muscle were identified as was the carotid pulse.  The 

internal jugular vein was identified using ultrasound.  Anesthesia was achieved 

over the vein using 4 cc of 1% lidocaine.  Using real-time out of plane 

guidance, the introducer needle was inserted into the internal jugular vein 

under direct ultrasound visualization.  Venous blood was withdrawn.  The syringe

was removed and a guidewire was advanced into the introducer needle.  The 

guidewire was visualized in the internal jugular vein by ultrasound.  A small 

incision was made at the skin surface with a scalpel and the introducer needle 

was exchanged for a dilator over the guidewire.  After appropriate dilation was 

obtained, the introducer was advanced  and dilator withdrawn with the guidwire. 

A IO patch was placed and a sterile Sorbaview shield was placed over the 

catheter at the insertion site.  The patient tolerated the procedure well 

without any hemodynamic compromise.  At time of procedure completion, all ports 

aspirated and flushed properly.  Postprocedure x-ray shows central line in 

proper place.  Estimated blood loss is approximately 5 cc.


Indication for Procedure: Transvenous Pacing wire insertion. 


Date: 04/10/20


Provider: RUSSELL SHERIFF





- Central Line


  ** Right Internal jugular


Time completed: 18:00


Consent obtained: Yes


Central line pre-insertion: Sterile PPE donned, Chloraprep applied, Sterile 

drapes applied


Central line lumen type: Cordis/Introducer


Anesthetic type: 1% Lidocaine


mL's of anesthesia: 3


Ultrasound guided: Yes


Line secured with sutures: Yes


Central line post-insertion: Blood return from lumens, Biopatch applied, 

Sutured, Sterile dressing applied, Other - CXR Pending


Complications: No

## 2020-04-26 NOTE — PDOC CRITICAL CARE PROG REPORT
General


Date:: 04/26/20


ICU Day:: 16


Ventilator Day:: 16


Resuscitation Status: Full Code


Medical Power of : DaughterMaryuri


Events in the past 12 to 24 Hours:: 





Patient had significant bradycardia today with prolonged period of asystole and 

frequent periods of ventricular rhythm in the 40s.  Cardiology consult was 

placed.  Transcutaneous pacing pads placed on patient, we will plan to keep 

atropine, dopamine and isoproterenol on call if an emergent situation arises.  A

right IJ Cordis was placed to facilitate the placement of transvenous pacing 

wires showed an emergent need be presented.


Review of systems relevant to events:: 


Cardiology: Patient with significant and symptomatic intermittent bradycardia.  

EKG obtained which captured only sinus tachycardia however, rhythm strip 

demonstrates severe bradycardia and ventricular rhythm with corresponding 

hypotension.


Reason for ICU Addmission:: Acute hypoxic respiratory failure with SARS, 2-

CoViD19. Intubated. Supine.





- Medications:


Medications reviewed and adjusted accordingly: Yes - All medications reviewed.  

Cardene drip DC'd and has not been given.


Sedation:: 





Patient remains on Versed at 10 and morphine at 5 mg/h.





Physical Exam


Vital Signs: 


                                        











Temp Pulse Resp BP Pulse Ox


 


 97.0 F   84   15   157/89 H  93 


 


 04/26/20 14:00  04/26/20 14:00  04/26/20 14:00  04/26/20 14:00  04/26/20 14:00








                                 Intake & Output











 04/25/20 04/26/20 04/27/20





 06:59 06:59 06:59


 


Intake Total 2557 1240 520


 


Output Total 1670 3500 208


 


Balance 887 -2260 312


 


Weight 121.2 kg 122.2 kg 








                                  Weight/Height





Weight                           122.2 kg


Height                           5 ft 3 in








General appearance: PRESENT: morbidly obese


Head exam: PRESENT: atraumatic


Eye exam: PRESENT: periorbital swelling


Mouth exam: PRESENT: moist, neck supple


Neck exam: PRESENT: full ROM, JVD


Respiratory exam: PRESENT: symmetrical.  ABSENT: rhonchi, wheezes


Cardiovascular exam: PRESENT: bradycardia


Pulses: PRESENT: normal carotid pulses


Vascular exam: PRESENT: normal capillary refill


GI/Abdominal exam: PRESENT: normal bowel sounds, soft


Musculoskeletal exam: PRESENT: normal inspection


Neurological exam: PRESENT: CN II-XII grossly intact, other - Sedated, responds 

to discomfort


Skin exam: PRESENT: intact, warm.  ABSENT: cyanosis


Tubes/Lines: PRESENT: Endotracheal Tube, Central Line





Laboratory/Radiographs


Laboratory Results: 


                                        





                                 04/26/20 05:30 





                                 04/26/20 05:30 





                                        











  04/25/20 04/26/20 04/26/20





  20:10 05:30 05:30


 


WBC   11.3 H 


 


RBC   2.95 L 


 


Hgb   8.7 L 


 


Hct   25.8 L 


 


MCV   88 


 


MCH   29.5 


 


MCHC   33.7 


 


RDW   17.2 H 


 


Plt Count   107 L 


 


Seg Neutrophils %   Not Reportable 


 


Sodium    147.9 H


 


Potassium    4.0


 


Chloride    113 H


 


Carbon Dioxide    35 H


 


Anion Gap    0 L


 


BUN    44 H


 


Creatinine    0.78


 


Est GFR ( Amer)    > 60


 


Glucose    133 H


 


Calcium    8.1 L


 


Total Bilirubin    0.7


 


AST    41 H


 


Alkaline Phosphatase    116


 


Total Protein    5.1 L


 


Albumin    2.7 L


 


Urine Color  YELLOW  


 


Urine Appearance  CLOUDY  


 


Urine pH  5.0  


 


Ur Specific Gravity  1.006  


 


Urine Protein  NEGATIVE  


 


Urine Glucose (UA)  NEGATIVE  


 


Urine Ketones  NEGATIVE  


 


Urine Blood  MODERATE H  


 


Urine RBC (Auto)  102  








                                        











  04/05/20 04/10/20 04/10/20





  14:00 11:48 11:48


 


Creatine Kinase   105 


 


CK-MB (CK-2)    0.87


 


Troponin I    < 0.012


 


NT-Pro-B Natriuret Pep  32  














  04/10/20 04/10/20 04/11/20





  22:45 22:45 04:45


 


Creatine Kinase  60   46


 


CK-MB (CK-2)   0.41 


 


Troponin I   0.018 


 


NT-Pro-B Natriuret Pep   














  04/11/20





  04:45


 


Creatine Kinase 


 


CK-MB (CK-2)  0.47


 


Troponin I  < 0.012


 


NT-Pro-B Natriuret Pep 











Impressions: 


                                        





Chest/Abdomen CTA  04/05/20 16:08


IMPRESSION:


1. Patchy peripheral and perihilar ground-glass opacities in both lungs.  These 

are commonly reported imaging features of COVID-19   pneumonia are present. 

Other processes such as influenza pneumonia and organizing pneumonia, as can be 

seen with drug toxicity and connective tissue disease, can cause a similar 

imaging pattern. PneTyp


2. Limited evaluation of the pulmonary arteries due to contrast bolus timing.  

No large central pulmonary embolus.  Evaluation of the segmental and 

subsegmental pulmonary arteries is limited.


 








Chest X-Ray  04/17/20 00:00


IMPRESSION:


 


Little interval change


 


 


copyright 2011 [employer]- All Rights Reserved


 











All labs, radiographs, diagnostic studies and EKGs were personally reviewed: Yes


In addition, reports of radiographic and diagnostic studies were read: Yes





Assessment and Plan





- Diagnosis


(1) Acute hypoxemic respiratory failure


Is this a current diagnosis for this admission?: Yes   


Plan: 


No changes made on ventilatory support today.  Patient remains on pressure 

control with an inspiratory pressure of 30, PEEP 8, respiratory rate 15, 100% 

FiO2 to maintain SPO2 of 95%.








(2) Asystole


Is this a current diagnosis for this admission?: Yes   


Plan: 


With multiple episodes of asystole today as well as symptomatic bradycardia.  

She has so far not required any medical intervention as she has spontaneous 

return of normal sinus rhythm.  For precaution however, it would be appropriate 

to place a Cordis central venous catheter to facilitate the placement of a 

transvenous pacer should be needed.








(3) Suspected COVID-19 virus infection


Is this a current diagnosis for this admission?: Yes   


Plan: 


Patient with persistent respiratory failure in secondary to COVID-19 virus 

infection.





Plan Summary: 





Neuro: Patient remains minimally responsive on Versed at 10 and morphine at 5 

mg/h.  She does respond to deep discomfort.


Pulmonary: Patient remains with refractory hypoxemia.  Vent settings: Pressure 

control, inspiratory pressure: 30, PEEP 8, respiratory rate 15, FiO2: 100%.  

SPO2 on the settings 94%.  She continues on albuterol and Solu-Cortef.


Cardiovascular: As mentioned above, patient has had multiple episodes of severe 

bradycardia with occasional episodes of prolonged asystole.  Cardiology consult 

placed.  A Cordis was placed to facilitate placement of transvenous pacing wires

if needed.  Nursing team instructed to have atropine, dopamine and possibly 

isoproterenol prepared if needed.


Family was contacted regarding patient's poor cardiac status and they seem to 

understand the gravity of this acute situation.  We will keep the family updated

as the situation evolves.


Renal: Electrolytes have been relatively normal and not suspected to be the 

etiology of her cardiac arrhythmias.  BUN/creatinine are within her relative 

normal limits.  I/O is negative today but she has been euvolemic over the past 

several days.


GI: Rectal tube in place.  Stool is loose, but not fluidic.  Remains on tube 

feeds with vital 1.5 at 30 mL/h.  Tube feeds currently held due to current risk 

of acute arrhythmia.


ID: WBC with only slight elevation today.  No significant suspicion of acute 

infection.  Will obtain cultures and begin antibiotics if she becomes febrile.








Critical Time


Critical Time (minutes): 85


Level of Care: ICU


-: 


1.  The care of a critical patient is a dynamic process.  This note is a 

representative synopsis but static in nature.  The timeframe for treatments 

given in order is not necessarily the actual time these treatments may have been

done.





2.  This patient requires critical care secondary to ongoing requirements for 

therapy not offered or safe outside the critical care environment.  Transfer to 

a lower level of care will result in altered life or limb morbidity and 

mortality.





3.  Multidisciplinary rounds completed.





4.  ABCDE bundle addressed.

## 2020-04-27 LAB
ABSOLUTE LYMPHOCYTES# (MANUAL): 0.3 10^3/UL (ref 0.5–4.7)
ABSOLUTE MONOCYTES # (MANUAL): 0 10^3/UL (ref 0.1–1.4)
ADD MANUAL DIFF: YES
ANION GAP SERPL CALC-SCNC: -2 MMOL/L (ref 5–19)
ANISOCYTOSIS BLD QL SMEAR: (no result)
ARTERIAL BLOOD FIO2: (no result)
ARTERIAL BLOOD H2CO3: 1.39 MMOL/L (ref 1.05–1.35)
ARTERIAL BLOOD HCO3: 31.7 MMOL/L (ref 20–24)
ARTERIAL BLOOD PCO2: 46.3 MMHG (ref 35–45)
ARTERIAL BLOOD PH: 7.45 (ref 7.35–7.45)
ARTERIAL BLOOD PO2: 124.5 MMHG (ref 80–100)
ARTERIAL BLOOD TOTAL CO2: 33.2 MMOL/L (ref 21–25)
BASE EXCESS BLDA CALC-SCNC: 7.1 MMOL/L
BASOPHILS NFR BLD MANUAL: 0 % (ref 0–2)
BUN SERPL-MCNC: 41 MG/DL (ref 7–20)
CALCIUM: 7.9 MG/DL (ref 8.4–10.2)
CHLORIDE SERPL-SCNC: 116 MMOL/L (ref 98–107)
CO2 SERPL-SCNC: 34 MMOL/L (ref 22–30)
EOSINOPHIL NFR BLD MANUAL: 0 % (ref 0–6)
ERYTHROCYTE [DISTWIDTH] IN BLOOD BY AUTOMATED COUNT: 17.5 % (ref 11.5–14)
ERYTHROCYTE [DISTWIDTH] IN BLOOD BY AUTOMATED COUNT: 17.8 % (ref 11.5–14)
GLUCOSE SERPL-MCNC: 73 MG/DL (ref 75–110)
HCT VFR BLD CALC: 18.8 % (ref 36–47)
HCT VFR BLD CALC: 23.7 % (ref 36–47)
HGB BLD-MCNC: 6.3 G/DL (ref 12–15.5)
HGB BLD-MCNC: 8 G/DL (ref 12–15.5)
MCH RBC QN AUTO: 29.5 PG (ref 27–33.4)
MCH RBC QN AUTO: 29.5 PG (ref 27–33.4)
MCHC RBC AUTO-ENTMCNC: 33.4 G/DL (ref 32–36)
MCHC RBC AUTO-ENTMCNC: 33.6 G/DL (ref 32–36)
MCV RBC AUTO: 88 FL (ref 80–97)
MCV RBC AUTO: 89 FL (ref 80–97)
MONOCYTES % (MANUAL): 0 % (ref 3–13)
OVALOCYTES BLD QL SMEAR: SLIGHT
PLATELET # BLD: 100 10^3/UL (ref 150–450)
PLATELET # BLD: 76 10^3/UL (ref 150–450)
PLATELET COMMENT: ADEQUATE
POIKILOCYTOSIS BLD QL SMEAR: (no result)
POTASSIUM SERPL-SCNC: 3.7 MMOL/L (ref 3.6–5)
RBC # BLD AUTO: 2.12 10^6/UL (ref 3.72–5.28)
RBC # BLD AUTO: 2.69 10^6/UL (ref 3.72–5.28)
SAO2 % BLDA: 98.6 % (ref 94–98)
SEGMENTED NEUTROPHILS % (MAN): 97 % (ref 42–78)
TOTAL CELLS COUNTED BLD: 100
TOXIC GRANULES BLD QL SMEAR: (no result)
VARIANT LYMPHS NFR BLD MANUAL: 3 % (ref 13–45)
WBC # BLD AUTO: 7.2 10^3/UL (ref 4–10.5)
WBC # BLD AUTO: 9.1 10^3/UL (ref 4–10.5)

## 2020-04-27 PROCEDURE — 30233N1 TRANSFUSION OF NONAUTOLOGOUS RED BLOOD CELLS INTO PERIPHERAL VEIN, PERCUTANEOUS APPROACH: ICD-10-PCS

## 2020-04-27 RX ADMIN — MINERAL OIL AND WHITE PETROLATUM SCH: 150; 830 OINTMENT OPHTHALMIC at 16:25

## 2020-04-27 RX ADMIN — MORPHINE SULFATE PRN MLS/HR: 1 INJECTION, SOLUTION INTRAVENOUS at 22:00

## 2020-04-27 RX ADMIN — INSULIN HUMAN SCH: 100 INJECTION, SOLUTION PARENTERAL at 14:50

## 2020-04-27 RX ADMIN — MORPHINE SULFATE PRN MLS/HR: 1 INJECTION, SOLUTION INTRAVENOUS at 01:55

## 2020-04-27 RX ADMIN — Medication SCH: at 05:38

## 2020-04-27 RX ADMIN — FAMOTIDINE SCH MG: 20 TABLET, FILM COATED ORAL at 09:42

## 2020-04-27 RX ADMIN — INSULIN HUMAN SCH: 100 INJECTION, SOLUTION PARENTERAL at 05:38

## 2020-04-27 RX ADMIN — OXYCODONE HYDROCHLORIDE AND ACETAMINOPHEN SCH MG: 500 TABLET ORAL at 09:41

## 2020-04-27 RX ADMIN — INSULIN GLARGINE SCH: 100 INJECTION, SOLUTION SUBCUTANEOUS at 17:19

## 2020-04-27 RX ADMIN — INSULIN HUMAN SCH: 100 INJECTION, SOLUTION PARENTERAL at 09:27

## 2020-04-27 RX ADMIN — OXYCODONE HYDROCHLORIDE AND ACETAMINOPHEN SCH MG: 500 TABLET ORAL at 17:19

## 2020-04-27 RX ADMIN — MIDAZOLAM HYDROCHLORIDE PRN MLS/HR: 5 INJECTION, SOLUTION INTRAMUSCULAR; INTRAVENOUS at 05:39

## 2020-04-27 RX ADMIN — NICARDIPINE HYDROCHLORIDE PRN MLS/HR: 0.1 INJECTION, SOLUTION INTRAVENOUS at 22:17

## 2020-04-27 RX ADMIN — HYDROCORTISONE SODIUM SUCCINATE SCH MG: 100 INJECTION, POWDER, FOR SOLUTION INTRAMUSCULAR; INTRAVENOUS at 14:00

## 2020-04-27 RX ADMIN — METHOCARBAMOL SCH MG: 500 TABLET ORAL at 05:37

## 2020-04-27 RX ADMIN — SODIUM PHOSPHATE, MONOBASIC, MONOHYDRATE PRN GM: 276; 142 INJECTION, SOLUTION INTRAVENOUS at 14:45

## 2020-04-27 RX ADMIN — MIDAZOLAM HYDROCHLORIDE PRN MLS/HR: 5 INJECTION, SOLUTION INTRAMUSCULAR; INTRAVENOUS at 09:40

## 2020-04-27 RX ADMIN — Medication SCH MG: at 09:42

## 2020-04-27 RX ADMIN — Medication SCH MG: at 21:21

## 2020-04-27 RX ADMIN — VITAMIN D, TAB 1000IU (100/BT) SCH UNIT: 25 TAB at 09:41

## 2020-04-27 RX ADMIN — HYDROCORTISONE SODIUM SUCCINATE SCH MG: 100 INJECTION, POWDER, FOR SOLUTION INTRAMUSCULAR; INTRAVENOUS at 21:22

## 2020-04-27 RX ADMIN — FAMOTIDINE SCH MG: 20 TABLET, FILM COATED ORAL at 21:21

## 2020-04-27 RX ADMIN — SODIUM PHOSPHATE, MONOBASIC, MONOHYDRATE PRN GM: 276; 142 INJECTION, SOLUTION INTRAVENOUS at 09:28

## 2020-04-27 RX ADMIN — MIDAZOLAM HYDROCHLORIDE PRN MLS/HR: 5 INJECTION, SOLUTION INTRAMUSCULAR; INTRAVENOUS at 00:10

## 2020-04-27 RX ADMIN — INSULIN HUMAN SCH: 100 INJECTION, SOLUTION PARENTERAL at 22:10

## 2020-04-27 RX ADMIN — NICARDIPINE HYDROCHLORIDE PRN MLS/HR: 0.1 INJECTION, SOLUTION INTRAVENOUS at 20:00

## 2020-04-27 RX ADMIN — INSULIN GLARGINE SCH: 100 INJECTION, SOLUTION SUBCUTANEOUS at 09:35

## 2020-04-27 RX ADMIN — Medication SCH: at 16:26

## 2020-04-27 RX ADMIN — HYDROCORTISONE SODIUM SUCCINATE SCH MG: 100 INJECTION, POWDER, FOR SOLUTION INTRAMUSCULAR; INTRAVENOUS at 05:36

## 2020-04-27 RX ADMIN — NICARDIPINE HYDROCHLORIDE PRN MLS/HR: 0.1 INJECTION, SOLUTION INTRAVENOUS at 23:53

## 2020-04-27 RX ADMIN — ENOXAPARIN SODIUM SCH MG: 100 INJECTION SUBCUTANEOUS at 09:41

## 2020-04-27 RX ADMIN — INSULIN HUMAN SCH: 100 INJECTION, SOLUTION PARENTERAL at 02:18

## 2020-04-27 RX ADMIN — METHOCARBAMOL SCH MG: 500 TABLET ORAL at 21:21

## 2020-04-27 RX ADMIN — ENOXAPARIN SODIUM SCH: 100 INJECTION SUBCUTANEOUS at 22:11

## 2020-04-27 RX ADMIN — Medication SCH: at 21:22

## 2020-04-27 RX ADMIN — MIDAZOLAM HYDROCHLORIDE PRN MLS/HR: 5 INJECTION, SOLUTION INTRAMUSCULAR; INTRAVENOUS at 21:58

## 2020-04-27 RX ADMIN — MIDAZOLAM HYDROCHLORIDE PRN MLS/HR: 5 INJECTION, SOLUTION INTRAMUSCULAR; INTRAVENOUS at 16:30

## 2020-04-27 RX ADMIN — MORPHINE SULFATE PRN MLS/HR: 1 INJECTION, SOLUTION INTRAVENOUS at 13:30

## 2020-04-27 RX ADMIN — METHOCARBAMOL SCH MG: 500 TABLET ORAL at 14:00

## 2020-04-27 RX ADMIN — INSULIN HUMAN SCH: 100 INJECTION, SOLUTION PARENTERAL at 17:19

## 2020-04-27 RX ADMIN — MINERAL OIL AND WHITE PETROLATUM SCH: 150; 830 OINTMENT OPHTHALMIC at 22:12

## 2020-04-27 RX ADMIN — MINERAL OIL AND WHITE PETROLATUM SCH: 150; 830 OINTMENT OPHTHALMIC at 05:38

## 2020-04-27 NOTE — PDOC CRITICAL CARE PROG REPORT
General


Date:: 04/27/20


ICU Day:: 17


Ventilator Day:: 17


Hospital Day:: 22


Resuscitation Status: Full Code


Medical Power of : DaughterMaryuri


Events in the past 12 to 24 Hours:: 





4/26


Patient had significant bradycardia today with prolonged period of asystole and 

frequent periods of ventricular rhythm in the 40s.  Cardiology consult was 

placed.  Transcutaneous pacing pads placed on patient, we will plan to keep 

atropine, dopamine and isoproterenol on call if an emergent situation arises.  A

right IJ Cordis was placed to facilitate the placement of transvenous pacing 

wires showed an emergent need be presented.





4/27


Overnight, patient had multiple episodes of bradycardia and asystole requiring 1

dose of atropine.  Patient also had some bleeding from the new Cordis site.  

Follow-up CBC showed an acute anemia.  2 units PRBC have been ordered.


Review of systems relevant to events:: 





Please see complete review of systems below.


Reason for ICU Addmission:: Acute hypoxic respiratory failure with SARS, 2-

CoViD19. Intubated. Supine.





- Medications:


Medications reviewed and adjusted accordingly: Yes


Sedation:: 


Patient remains on Versed at 10 and morphine at 5 mg/h.








Physical Exam


Vital Signs: 


                                        











Temp Pulse Resp BP Pulse Ox


 


 96.1 F L  58 L  23 H  106/66   100 


 


 04/27/20 14:45  04/27/20 08:00  04/27/20 14:45  04/27/20 14:33  04/27/20 14:45








                                 Intake & Output











 04/26/20 04/27/20 04/28/20





 06:59 06:59 06:59


 


Intake Total 1240 886 240


 


Output Total 3500 1541 575


 


Balance -6873 -655 -335


 


Weight 122.2 kg 119.5 kg 








                                  Weight/Height





Weight                           119.5 kg


Height                           5 ft 3 in








General appearance: PRESENT: no acute distress, morbidly obese


Head exam: PRESENT: atraumatic


Eye exam: PRESENT: conjunctival injection, periorbital swelling, PERRLA


Neck exam: PRESENT: full ROM


Respiratory exam: PRESENT: symmetrical, other - No auscultation due to positive 

COVID status.  Flow waves on ventilator appear smooth and without restriction.  

Lung compliance seems adequate.


Cardiovascular exam: PRESENT: other - Patient with tacky/bradycardialike 

syndrome.


Pulses: PRESENT: normal radial pulses, normal dorsalis pedis pul


Vascular exam: PRESENT: normal capillary refill


GI/Abdominal exam: PRESENT: soft


Extremities exam: PRESENT: full ROM.  ABSENT: pedal edema


Musculoskeletal exam: PRESENT: full ROM


Neurological exam: PRESENT: other - Sedated.  Responds to discomfort.


Skin exam: PRESENT: intact.  ABSENT: abrasion


Tubes/Lines: PRESENT: Endotracheal Tube, Central Line





Laboratory/Radiographs


Laboratory Results: 


                                        





                                 04/27/20 14:45 





                                 04/27/20 05:35 





                                        











  04/27/20 04/27/20 04/27/20





  05:35 05:35 11:46


 


WBC   9.1 


 


RBC   2.69 L 


 


Hgb   8.0 L 


 


Hct   23.7 L 


 


MCV   88 


 


MCH   29.5 


 


MCHC   33.6 


 


RDW   17.8 H 


 


Plt Count   100 L 


 


Seg Neutrophils %   Not Reportable 


 


Carbonic Acid    1.39 H


 


HCO3/H2CO3 Ratio    22:1


 


ABG pH    7.45


 


ABG pCO2    46.3 H


 


ABG pO2    124.5 H


 


ABG HCO3    31.7 H


 


ABG O2 Saturation    98.6 H


 


ABG Base Excess    7.1


 


FiO2    100%


 


Sodium  148.1 H  


 


Potassium  3.7  


 


Chloride  116 H  


 


Carbon Dioxide  34 H  


 


Anion Gap  -2 L  


 


BUN  41 H  


 


Creatinine  0.56  


 


Est GFR ( Amer)  > 60  


 


Glucose  73 L  


 


Calcium  7.9 L  














  04/27/20 04/27/20





  13:12 14:45


 


WBC  Cancelled  7.2


 


RBC  Cancelled  2.12 L


 


Hgb  Cancelled  6.3 L


 


Hct  Cancelled  18.8 L


 


MCV  Cancelled  89


 


MCH  Cancelled  29.5


 


MCHC  Cancelled  33.4


 


RDW  Cancelled  17.5 H


 


Plt Count  Cancelled  76 L


 


Seg Neutrophils %  


 


Carbonic Acid  


 


HCO3/H2CO3 Ratio  


 


ABG pH  


 


ABG pCO2  


 


ABG pO2  


 


ABG HCO3  


 


ABG O2 Saturation  


 


ABG Base Excess  


 


FiO2  


 


Sodium  


 


Potassium  


 


Chloride  


 


Carbon Dioxide  


 


Anion Gap  


 


BUN  


 


Creatinine  


 


Est GFR (African Amer)  


 


Glucose  


 


Calcium  








                                        











  04/05/20 04/10/20 04/10/20





  14:00 11:48 11:48


 


Creatine Kinase   105 


 


CK-MB (CK-2)    0.87


 


Troponin I    < 0.012


 


NT-Pro-B Natriuret Pep  32  














  04/10/20 04/10/20 04/11/20





  22:45 22:45 04:45


 


Creatine Kinase  60   46


 


CK-MB (CK-2)   0.41 


 


Troponin I   0.018 


 


NT-Pro-B Natriuret Pep   














  04/11/20





  04:45


 


Creatine Kinase 


 


CK-MB (CK-2)  0.47


 


Troponin I  < 0.012


 


NT-Pro-B Natriuret Pep 











Impressions: 


                                        





Chest/Abdomen CTA  04/05/20 16:08


IMPRESSION:


1. Patchy peripheral and perihilar ground-glass opacities in both lungs.  These 

are commonly reported imaging features of COVID-19   pneumonia are present. 

Other processes such as influenza pneumonia and organizing pneumonia, as can be 

seen with drug toxicity and connective tissue disease, can cause a similar imag

ing pattern. PneTyp


2. Limited evaluation of the pulmonary arteries due to contrast bolus timing.  N

o large central pulmonary embolus.  Evaluation of the segmental and subsegmental

pulmonary arteries is limited.


 








Chest X-Ray  04/26/20 00:00


IMPRESSION:  NO PNEUMOTHORAX FOLLOWING CENTRAL LINE PLACEMENT.  OVERALL NO 

SIGNIFICANT INTERVAL CHANGE IN APPEARANCE OF THE CHEST.


 











All labs, radiographs, diagnostic studies and EKGs were personally reviewed: Yes


In addition, reports of radiographic and diagnostic studies were read: Yes





Assessment and Plan





- Diagnosis


(1) Acute hypoxemic respiratory failure


Is this a current diagnosis for this admission?: Yes   


Plan: 


No changes made on ventilatory support today.  Patient remains on pressure 

control with an inspiratory pressure of 30, PEEP 8, respiratory rate 15, 100% 

FiO2 to maintain SPO2 of 95%.








(2) Asystole


Is this a current diagnosis for this admission?: Yes   


Plan: 


Less frequent episodes of asystole/bradycardia today.  Cordis in place if 

transvenous pacer needed.








(3) Suspected COVID-19 virus infection


Is this a current diagnosis for this admission?: Yes   


Plan: 


Patient with persistent respiratory failure in secondary to COVID-19 virus 

infection.





Plan Summary: 


ICU day: 17





Neuro: Patient remains responsive only to discomfort.  She is on Versed at 10 

and morphine at 5 mg/h.  We will set a regimen of lightening her sedation daily,

however, she will need to be sedated until her AA gradient improves and she 

requires less aggressive support on the ventilator.


Pulmonary: Patient remains on pressure control ventilation with inspiratory 

pressure 30, inspiratory time: 3 seconds, PEEP 8, respiratory rate 15, FiO2 

100%.  We have had difficulty over the past several days weaning her FiO2 and in

spiratory time due to hypoxemia.  This problem is compounded by her cardiac 

rhythm instability.  We are weaning the Solu-Cortef and it was changed to 25 mg 

IV every 8 hours today.  Patient's lung compliance remains adequate.  Her 

respiratory failure secondary to COVID-19 infection seems typical for this virus

and is primarily hypoxemic.


Cardiovascular: Less frequent episodes of tachycardia/bradycardia today.  

Vascular pacing wires have been on standby and not placed due to their potential

for causing further arrhythmias.  Pacing mode available is set pacing only and 

does not sense.  We will therefore only use intervascular pacing if patient has 

persistent asystole.


       Indwelling catheters: Cordis placed yesterday for intravascular pacing 

wires if needed.  Of note, site of Cordis placement at the level of skin was 

bleeding overnight and patient seems to have lost a significant volume of blood.

 2 units PRBC have been ordered.  We will follow-up CBC following blood 

transfusion.  Bleeding has subsided with pressure dressing.


Heme: H&H decreased this afternoon due to persistent bleed as above.  2 units 

PRBC pending.


      DVT prophylaxis: Patient continues on Lovenox every 12 hours subcu.  Will 

hold dose this evening due to bleeding.


Renal: Renal panel has been stable.  Mildly elevated sodium level.


Gastrointestinal: Tube feeds were held yesterday due to possible pending cardiac

arrest.  They were restarted today.


      GI prophylaxis: Continue GI prophylaxis with Pepcid 20 mg p.o. every 12 

hours.


Current: Patient with a history of DM 2, on regular insulin sliding scale as 

well as Lantus.  Patient was hypoglycemic today and received 1 amp of D50.  

Blood sugar level has resolved.  Dose of Lantus being held tonight.  Tube feeds 

restarted.


: Ervin catheter in place.  No signs of infection.


ID: WBC remains within normal limits.  No signs of obvious infection.  No antibi

otics at this time.





Barriers to discharge from ICU: Patient still with COVID-19 related respiratory 

failure and severe hypoxemia despite adequate ventilation.  Now with cardiac 

rhythm instability.











Critical Time


Critical Time (minutes): 70


Level of Care: ICU


-: 


1.  The care of a critical patient is a dynamic process.  This note is a 

representative synopsis but static in nature.  The timeframe for treatments 

given in order is not necessarily the actual time these treatments may have been

done.





2.  This patient requires critical care secondary to ongoing requirements for 

therapy not offered or safe outside the critical care environment.  Transfer to 

a lower level of care will result in altered life or limb morbidity and 

mortality.





3.  Multidisciplinary rounds completed.





4.  ABCDE bundle addressed.

## 2020-04-28 LAB
ABSOLUTE LYMPHOCYTES# (MANUAL): 0.5 10^3/UL (ref 0.5–4.7)
ABSOLUTE MONOCYTES # (MANUAL): 0.7 10^3/UL (ref 0.1–1.4)
ADD MANUAL DIFF: YES
ANION GAP SERPL CALC-SCNC: 2 MMOL/L (ref 5–19)
ANISOCYTOSIS BLD QL SMEAR: (no result)
BASOPHILS NFR BLD MANUAL: 0 % (ref 0–2)
BUN SERPL-MCNC: 37 MG/DL (ref 7–20)
CALCIUM: 8.3 MG/DL (ref 8.4–10.2)
CHLORIDE SERPL-SCNC: 113 MMOL/L (ref 98–107)
CO2 SERPL-SCNC: 34 MMOL/L (ref 22–30)
EOSINOPHIL NFR BLD MANUAL: 0 % (ref 0–6)
ERYTHROCYTE [DISTWIDTH] IN BLOOD BY AUTOMATED COUNT: 16.4 % (ref 11.5–14)
GLUCOSE SERPL-MCNC: 98 MG/DL (ref 75–110)
HCT VFR BLD CALC: 35.5 % (ref 36–47)
HGB BLD-MCNC: 11.8 G/DL (ref 12–15.5)
MCH RBC QN AUTO: 29.1 PG (ref 27–33.4)
MCHC RBC AUTO-ENTMCNC: 33.2 G/DL (ref 32–36)
MCV RBC AUTO: 87 FL (ref 80–97)
MONOCYTES % (MANUAL): 4 % (ref 3–13)
NEUTS BAND NFR BLD MANUAL: 1 % (ref 3–5)
OVALOCYTES BLD QL SMEAR: SLIGHT
PLATELET # BLD: 110 10^3/UL (ref 150–450)
PLATELET COMMENT: (no result)
POIKILOCYTOSIS BLD QL SMEAR: SLIGHT
POLYCHROMASIA BLD QL SMEAR: SLIGHT
POTASSIUM SERPL-SCNC: 4.2 MMOL/L (ref 3.6–5)
RBC # BLD AUTO: 4.06 10^6/UL (ref 3.72–5.28)
SCHISTOCYTES BLD QL SMEAR: SLIGHT
SEGMENTED NEUTROPHILS % (MAN): 92 % (ref 42–78)
TOTAL CELLS COUNTED BLD: 100
TOXIC GRANULES BLD QL SMEAR: SLIGHT
VARIANT LYMPHS NFR BLD MANUAL: 3 % (ref 13–45)
WBC # BLD AUTO: 16.6 10^3/UL (ref 4–10.5)
WBC TOXIC VACUOLES BLD QL SMEAR: PRESENT

## 2020-04-28 RX ADMIN — Medication SCH: at 05:13

## 2020-04-28 RX ADMIN — Medication SCH MG: at 09:47

## 2020-04-28 RX ADMIN — NICARDIPINE HYDROCHLORIDE PRN MLS/HR: 0.1 INJECTION, SOLUTION INTRAVENOUS at 16:32

## 2020-04-28 RX ADMIN — METHOCARBAMOL SCH MG: 500 TABLET ORAL at 13:17

## 2020-04-28 RX ADMIN — NICARDIPINE HYDROCHLORIDE PRN MLS/HR: 0.1 INJECTION, SOLUTION INTRAVENOUS at 01:29

## 2020-04-28 RX ADMIN — OXYCODONE HYDROCHLORIDE AND ACETAMINOPHEN SCH MG: 500 TABLET ORAL at 09:47

## 2020-04-28 RX ADMIN — Medication SCH: at 13:17

## 2020-04-28 RX ADMIN — MINERAL OIL AND WHITE PETROLATUM SCH: 150; 830 OINTMENT OPHTHALMIC at 21:15

## 2020-04-28 RX ADMIN — INSULIN HUMAN SCH: 100 INJECTION, SOLUTION PARENTERAL at 20:55

## 2020-04-28 RX ADMIN — NICARDIPINE HYDROCHLORIDE PRN MLS/HR: 0.1 INJECTION, SOLUTION INTRAVENOUS at 04:45

## 2020-04-28 RX ADMIN — ENOXAPARIN SODIUM SCH MG: 100 INJECTION SUBCUTANEOUS at 21:14

## 2020-04-28 RX ADMIN — HYDROCORTISONE SODIUM SUCCINATE SCH MG: 100 INJECTION, POWDER, FOR SOLUTION INTRAMUSCULAR; INTRAVENOUS at 13:17

## 2020-04-28 RX ADMIN — Medication SCH MG: at 21:14

## 2020-04-28 RX ADMIN — MINERAL OIL AND WHITE PETROLATUM SCH: 150; 830 OINTMENT OPHTHALMIC at 13:17

## 2020-04-28 RX ADMIN — INSULIN HUMAN SCH: 100 INJECTION, SOLUTION PARENTERAL at 05:25

## 2020-04-28 RX ADMIN — INSULIN HUMAN SCH: 100 INJECTION, SOLUTION PARENTERAL at 21:03

## 2020-04-28 RX ADMIN — MIDAZOLAM HYDROCHLORIDE PRN MLS/HR: 5 INJECTION, SOLUTION INTRAMUSCULAR; INTRAVENOUS at 00:28

## 2020-04-28 RX ADMIN — FAMOTIDINE SCH MG: 20 TABLET, FILM COATED ORAL at 21:14

## 2020-04-28 RX ADMIN — FAMOTIDINE SCH MG: 20 TABLET, FILM COATED ORAL at 09:47

## 2020-04-28 RX ADMIN — ENOXAPARIN SODIUM SCH: 100 INJECTION SUBCUTANEOUS at 09:47

## 2020-04-28 RX ADMIN — MORPHINE SULFATE PRN MLS/HR: 1 INJECTION, SOLUTION INTRAVENOUS at 09:45

## 2020-04-28 RX ADMIN — MINERAL OIL AND WHITE PETROLATUM SCH: 150; 830 OINTMENT OPHTHALMIC at 05:13

## 2020-04-28 RX ADMIN — INSULIN HUMAN SCH: 100 INJECTION, SOLUTION PARENTERAL at 09:46

## 2020-04-28 RX ADMIN — INSULIN HUMAN SCH: 100 INJECTION, SOLUTION PARENTERAL at 01:06

## 2020-04-28 RX ADMIN — VITAMIN D, TAB 1000IU (100/BT) SCH UNIT: 25 TAB at 09:47

## 2020-04-28 RX ADMIN — MORPHINE SULFATE PRN MLS/HR: 1 INJECTION, SOLUTION INTRAVENOUS at 20:37

## 2020-04-28 RX ADMIN — METHOCARBAMOL SCH MG: 500 TABLET ORAL at 21:14

## 2020-04-28 RX ADMIN — NICARDIPINE HYDROCHLORIDE PRN MLS/HR: 0.1 INJECTION, SOLUTION INTRAVENOUS at 13:00

## 2020-04-28 RX ADMIN — INSULIN HUMAN SCH: 100 INJECTION, SOLUTION PARENTERAL at 13:19

## 2020-04-28 RX ADMIN — METHOCARBAMOL SCH MG: 500 TABLET ORAL at 05:13

## 2020-04-28 RX ADMIN — OXYCODONE HYDROCHLORIDE AND ACETAMINOPHEN SCH MG: 500 TABLET ORAL at 21:14

## 2020-04-28 RX ADMIN — NICARDIPINE HYDROCHLORIDE PRN MLS/HR: 0.1 INJECTION, SOLUTION INTRAVENOUS at 03:06

## 2020-04-28 RX ADMIN — INSULIN GLARGINE SCH: 100 INJECTION, SOLUTION SUBCUTANEOUS at 20:58

## 2020-04-28 RX ADMIN — HYDROCORTISONE SODIUM SUCCINATE SCH MG: 100 INJECTION, POWDER, FOR SOLUTION INTRAMUSCULAR; INTRAVENOUS at 05:13

## 2020-04-28 RX ADMIN — HYDROCORTISONE SODIUM SUCCINATE SCH MG: 100 INJECTION, POWDER, FOR SOLUTION INTRAMUSCULAR; INTRAVENOUS at 21:15

## 2020-04-28 RX ADMIN — Medication SCH: at 21:04

## 2020-04-28 RX ADMIN — INSULIN GLARGINE SCH: 100 INJECTION, SOLUTION SUBCUTANEOUS at 09:46

## 2020-04-28 NOTE — PDOC CRITICAL CARE PROG REPORT
General


Date:: 04/28/20


ICU Day:: 18


Ventilator Day:: 18


Resuscitation Status: Full Code


Medical Power of : DaughterMaryuri


Events in the past 12 to 24 Hours:: 





4/26


Patient had significant bradycardia today with prolonged period of asystole and 

frequent periods of ventricular rhythm in the 40s.  Cardiology consult was 

placed.  Transcutaneous pacing pads placed on patient, we will plan to keep 

atropine, dopamine and isoproterenol on call if an emergent situation arises.  A

right IJ Cordis was placed to facilitate the placement of transvenous pacing 

wires showed an emergent need be presented.





4/27


Overnight, patient had multiple episodes of bradycardia and asystole requiring 1

dose of atropine.  Patient also had some bleeding from the new Cordis site.  

Follow-up CBC showed an acute anemia.  2 units PRBC have been ordered.





4/28


Patient again has had multiple episodes of bradycardia and asystole over the 

past 24 hours.  Bleeding from Cordis site was controlled with localized 

pressure.  CBC improved significantly.  Patient has a endotracheal cuff leak 

which is requiring 5 to 10 cc of air to be added twice a day.  Plan will be to 

replace ET tube if cuff leak worsens.


Review of systems relevant to events:: 





Please see complete review of systems below.


Reason for ICU Addmission:: Acute hypoxic respiratory failure with SARS, 2-

CoViD19. Intubated. Supine.





- Medications:


Medications reviewed and adjusted accordingly: Yes


Sedation:: 


Patient remains on Versed at 10 and morphine at 5 mg/h.








Physical Exam


Vital Signs: 


                                        











Temp Pulse Resp BP Pulse Ox


 


 97.7 F   124 H  15   184/95 H  95 


 


 04/28/20 12:00  04/28/20 12:00  04/28/20 12:00  04/28/20 12:00  04/28/20 12:39








                                 Intake & Output











 04/27/20 04/28/20 04/29/20





 06:59 06:59 06:59


 


Intake Total 886 2306 393


 


Output Total 1541 1135 650


 


Balance -655 1171 -257


 


Weight 119.5 kg 124.3 kg 








                                  Weight/Height





Weight                           124.3 kg


Height                           5 ft 3 in








General appearance: PRESENT: morbidly obese


Head exam: PRESENT: atraumatic


Eye exam: PRESENT: conjunctival injection, EOMI, periorbital swelling, PERRLA


Mouth exam: PRESENT: moist


Neck exam: PRESENT: full ROM


Respiratory exam: PRESENT: other - Unable to auscultate due to COVID-19 

restrictions.  Flow and pressure waveforms on ventilator monitor appear normal 

with no significant respiratory restriction.  Lung compliance remains normal.


Cardiovascular exam: PRESENT: bradycardia, irregular rhythm


Pulses: PRESENT: normal radial pulses


Vascular exam: PRESENT: normal capillary refill


GI/Abdominal exam: PRESENT: diminished bowel sounds, soft


Extremities exam: PRESENT: +2 edema


Musculoskeletal exam: PRESENT: normal inspection


Neurological exam: PRESENT: other - Patient remained sedated on Versed and 

morphine.


Skin exam: PRESENT: normal color, warm


Tubes/Lines: PRESENT: Endotracheal Tube, Central Line, Arterial Catheter





Laboratory/Radiographs


Laboratory Results: 


                                        





                                 04/28/20 03:26 





                                 04/28/20 03:26 





                                        











  04/27/20 04/27/20 04/27/20





  13:12 14:45 17:25


 


WBC  Cancelled  7.2 


 


RBC  Cancelled  2.12 L 


 


Hgb  Cancelled  6.3 L 


 


Hct  Cancelled  18.8 L 


 


MCV  Cancelled  89 


 


MCH  Cancelled  29.5 


 


MCHC  Cancelled  33.4 


 


RDW  Cancelled  17.5 H 


 


Plt Count  Cancelled  76 L 


 


Seg Neutrophils %   


 


Sodium   


 


Potassium   


 


Chloride   


 


Carbon Dioxide   


 


Anion Gap   


 


BUN   


 


Creatinine   


 


Est GFR (African Amer)   


 


Glucose   


 


Calcium   


 


Blood Type    A POSITIVE


 


Antibody Screen    NEGATIVE














  04/28/20 04/28/20





  03:26 03:26


 


WBC   16.6 H D


 


RBC   4.06


 


Hgb   11.8 L D


 


Hct   35.5 L


 


MCV   87


 


MCH   29.1


 


MCHC   33.2


 


RDW   16.4 H


 


Plt Count   110 L


 


Seg Neutrophils %   Not Reportable


 


Sodium  149.0 H 


 


Potassium  4.2 


 


Chloride  113 H 


 


Carbon Dioxide  34 H 


 


Anion Gap  2 L 


 


BUN  37 H 


 


Creatinine  0.67 


 


Est GFR (African Amer)  > 60 


 


Glucose  98 


 


Calcium  8.3 L 


 


Blood Type  


 


Antibody Screen  








                                        











  04/05/20 04/10/20 04/10/20





  14:00 11:48 11:48


 


Creatine Kinase   105 


 


CK-MB (CK-2)    0.87


 


Troponin I    < 0.012


 


NT-Pro-B Natriuret Pep  32  














  04/10/20 04/10/20 04/11/20





  22:45 22:45 04:45


 


Creatine Kinase  60   46


 


CK-MB (CK-2)   0.41 


 


Troponin I   0.018 


 


NT-Pro-B Natriuret Pep   














  04/11/20





  04:45


 


Creatine Kinase 


 


CK-MB (CK-2)  0.47


 


Troponin I  < 0.012


 


NT-Pro-B Natriuret Pep 











Impressions: 


                                        





Chest/Abdomen CTA  04/05/20 16:08


IMPRESSION:


1. Patchy peripheral and perihilar ground-glass opacities in both lungs.  These 

are commonly reported imaging features of COVID-19   pneumonia are present. 

Other processes such as influenza pneumonia and organizing pneumonia, as can be 

seen with drug toxicity and connective tissue disease, can cause a similar 

imaging pattern. PneTyp


2. Limited evaluation of the pulmonary arteries due to contrast bolus timing.  

No large central pulmonary embolus.  Evaluation of the segmental and subsegmenta

l pulmonary arteries is limited.


 








Chest X-Ray  04/26/20 00:00


IMPRESSION:  NO PNEUMOTHORAX FOLLOWING CENTRAL LINE PLACEMENT.  OVERALL NO 

SIGNIFICANT INTERVAL CHANGE IN APPEARANCE OF THE CHEST.


 











All labs, radiographs, diagnostic studies and EKGs were personally reviewed: Yes


In addition, reports of radiographic and diagnostic studies were read: Yes





Assessment and Plan





- Diagnosis


(1) Acute hypoxemic respiratory failure


Is this a current diagnosis for this admission?: Yes   


Plan: 


No changes made on ventilatory support today.  Patient remains on pressure 

control with an inspiratory pressure of 30, PEEP 8, respiratory rate 15, 100% 

FiO2 to maintain SPO2 of 95%.








(2) Asystole


Is this a current diagnosis for this admission?: Yes   


Plan: 


Less frequent episodes of asystole/bradycardia today.  She did require 

transdermal pacing overnight.  Cordis in place if transvenous pacer needed.








(3) Suspected COVID-19 virus infection


Is this a current diagnosis for this admission?: Yes   


Plan: 


Patient with persistent respiratory failure in secondary to COVID-19 virus 

infection.  We will resend COVID cultures to evaluate and status.





Plan Summary: 


ICU day: 18





Neuro: Patient remains responsive only to discomfort.  She is on Versed at 10 

and morphine at 5 mg/h.  We will set a regimen of lightening her sedation daily,

however, she will need to be sedated until her AA gradient improves and she 

requires less aggressive support on the ventilator.


Pulmonary: Patient remains on pressure control ventilation with inspiratory 

pressure 30, inspiratory time: 3 seconds, PEEP 8, respiratory rate 15, FiO2 

100%.  We have had difficulty over the past several days weaning her FiO2 and 

inspiratory time due to hypoxemia.  This problem is compounded by her cardiac r

hythm instability.  We are weaning the Solu-Cortef and it was changed to 25 mg 

IV every 8 hours and can be weaned further tomorrow.  Patient's lung compliance 

remains adequate.  Her respiratory failure secondary to COVID-19 infection seems

typical for this virus and is primarily hypoxemic.  Of note, patient has 

developed a small endotracheal cuff air leak which is requiring 5 cc of air to 

be replaced occasionally.  If airleak gets any worse, ET tube will need to be 

replaced.


Cardiovascular: Less frequent episodes of tachycardia/bradycardia today.  

Vascular pacing wires have been on standby and not placed due to their potential

for causing further arrhythmias.  Pacing mode available is set pacing only and 

does not sense.  We will therefore only use intervascular pacing if patient has 

persistent asystole.


       Indwelling catheters: Pain from Cordis site has subsided with local 

cutaneous pressure dressing.  CBC with significant improvement following blood 

administration.  No other signs of bleeding.


Heme: As above, H&H has improved significantly.  Now 11.8/35.5.


      DVT prophylaxis: Okay to resume Lovenox with no further bleeding of cordis

site.


Renal: Renal panel has been stable.  Mildly elevated sodium level.  BUN and 

creatinine are within normal limits.


Gastrointestinal: Tube feeds restarted.  No significant residuals.


      GI prophylaxis: Continue GI prophylaxis with Pepcid 20 mg p.o. every 12 

hours.


Current: Patient with a history of DM 2, on regular insulin sliding scale as 

well as Lantus.  Patient was hypoglycemic today and received 1 amp of D50.  

Blood sugar level has resolved.  Dose of Lantus being held tonight.  Tube feeds 

restarted.


: Ervin catheter in place.  No signs of infection.


ID: WBC elevated today, no signs of obvious infection.  No antibiotics at this 

time.  We will investigate further and start antibiotics empirically if patient 

shows signs of infection including fever.





Barriers to discharge from ICU: Patient still with COVID-19 related respiratory 

failure and severe hypoxemia despite adequate ventilation.  Now with cardiac 

rhythm instability.











Critical Time


Critical Time (minutes): 65


Level of Care: ICU


-: 


1.  The care of a critical patient is a dynamic process.  This note is a 

representative synopsis but static in nature.  The timeframe for treatments 

given in order is not necessarily the actual time these treatments may have been

done.





2.  This patient requires critical care secondary to ongoing requirements for 

therapy not offered or safe outside the critical care environment.  Transfer to 

a lower level of care will result in altered life or limb morbidity and 

mortality.





3.  Multidisciplinary rounds completed.





4.  ABCDE bundle addressed.

## 2020-04-29 LAB
ABSOLUTE LYMPHOCYTES# (MANUAL): 0.1 10^3/UL (ref 0.5–4.7)
ABSOLUTE MONOCYTES # (MANUAL): 0.4 10^3/UL (ref 0.1–1.4)
ADD MANUAL DIFF: YES
ANION GAP SERPL CALC-SCNC: 2 MMOL/L (ref 5–19)
ANISOCYTOSIS BLD QL SMEAR: (no result)
ARTERIAL BLOOD H2CO3: 1.31 MMOL/L (ref 1.05–1.35)
ARTERIAL BLOOD H2CO3: 2.03 MMOL/L (ref 1.05–1.35)
ARTERIAL BLOOD HCO3: 28.9 MMOL/L (ref 20–24)
ARTERIAL BLOOD HCO3: 37.3 MMOL/L (ref 20–24)
ARTERIAL BLOOD PCO2: 43.6 MMHG (ref 35–45)
ARTERIAL BLOOD PCO2: 67.4 MMHG (ref 35–45)
ARTERIAL BLOOD PH: 7.36 (ref 7.35–7.45)
ARTERIAL BLOOD PH: 7.44 (ref 7.35–7.45)
ARTERIAL BLOOD PO2: 62.2 MMHG (ref 80–100)
ARTERIAL BLOOD PO2: 64.2 MMHG (ref 80–100)
ARTERIAL BLOOD TOTAL CO2: 30.2 MMOL/L (ref 21–25)
ARTERIAL BLOOD TOTAL CO2: 39.4 MMOL/L (ref 21–25)
BASE EXCESS BLDA CALC-SCNC: 4.3 MMOL/L
BASE EXCESS BLDA CALC-SCNC: 9.5 MMOL/L
BASOPHILS NFR BLD MANUAL: 0 % (ref 0–2)
BUN SERPL-MCNC: 36 MG/DL (ref 7–20)
CALCIUM: 8.5 MG/DL (ref 8.4–10.2)
CHLORIDE SERPL-SCNC: 112 MMOL/L (ref 98–107)
CO2 SERPL-SCNC: 35 MMOL/L (ref 22–30)
EOSINOPHIL NFR BLD MANUAL: 0 % (ref 0–6)
ERYTHROCYTE [DISTWIDTH] IN BLOOD BY AUTOMATED COUNT: 17.2 % (ref 11.5–14)
GLUCOSE SERPL-MCNC: 94 MG/DL (ref 75–110)
HCT VFR BLD CALC: 33 % (ref 36–47)
HGB BLD-MCNC: 11.1 G/DL (ref 12–15.5)
MCH RBC QN AUTO: 29.3 PG (ref 27–33.4)
MCHC RBC AUTO-ENTMCNC: 33.7 G/DL (ref 32–36)
MCV RBC AUTO: 87 FL (ref 80–97)
MONOCYTES % (MANUAL): 3 % (ref 3–13)
NEUTS BAND NFR BLD MANUAL: 1 % (ref 3–5)
OVALOCYTES BLD QL SMEAR: SLIGHT
PHOSPHATE SERPL-MCNC: 3 MG/DL (ref 2.5–4.5)
PLATELET # BLD: 87 10^3/UL (ref 150–450)
PLATELET COMMENT: (no result)
POTASSIUM SERPL-SCNC: 3.6 MMOL/L (ref 3.6–5)
RBC # BLD AUTO: 3.79 10^6/UL (ref 3.72–5.28)
SAO2 % BLDA: 90.1 % (ref 94–98)
SAO2 % BLDA: 93.1 % (ref 94–98)
SEGMENTED NEUTROPHILS % (MAN): 95 % (ref 42–78)
TOTAL CELLS COUNTED BLD: 100
TOXIC GRANULES BLD QL SMEAR: SLIGHT
VARIANT LYMPHS NFR BLD MANUAL: 1 % (ref 13–45)
WBC # BLD AUTO: 12.7 10^3/UL (ref 4–10.5)

## 2020-04-29 RX ADMIN — FAMOTIDINE SCH MG: 20 TABLET, FILM COATED ORAL at 09:25

## 2020-04-29 RX ADMIN — NICARDIPINE HYDROCHLORIDE PRN MLS/HR: 0.1 INJECTION, SOLUTION INTRAVENOUS at 15:15

## 2020-04-29 RX ADMIN — MIDAZOLAM HYDROCHLORIDE PRN MLS/HR: 5 INJECTION, SOLUTION INTRAMUSCULAR; INTRAVENOUS at 12:25

## 2020-04-29 RX ADMIN — METHOCARBAMOL SCH MG: 500 TABLET ORAL at 14:48

## 2020-04-29 RX ADMIN — ENOXAPARIN SODIUM SCH MG: 100 INJECTION SUBCUTANEOUS at 22:47

## 2020-04-29 RX ADMIN — HYDROCORTISONE SODIUM SUCCINATE SCH MG: 100 INJECTION, POWDER, FOR SOLUTION INTRAMUSCULAR; INTRAVENOUS at 22:48

## 2020-04-29 RX ADMIN — METHOCARBAMOL SCH MG: 500 TABLET ORAL at 05:52

## 2020-04-29 RX ADMIN — VITAMIN D, TAB 1000IU (100/BT) SCH UNIT: 25 TAB at 09:25

## 2020-04-29 RX ADMIN — INSULIN HUMAN SCH: 100 INJECTION, SOLUTION PARENTERAL at 04:38

## 2020-04-29 RX ADMIN — Medication SCH: at 05:52

## 2020-04-29 RX ADMIN — MINERAL OIL AND WHITE PETROLATUM SCH: 150; 830 OINTMENT OPHTHALMIC at 22:49

## 2020-04-29 RX ADMIN — MORPHINE SULFATE PRN MLS/HR: 1 INJECTION, SOLUTION INTRAVENOUS at 20:09

## 2020-04-29 RX ADMIN — INSULIN HUMAN SCH: 100 INJECTION, SOLUTION PARENTERAL at 09:24

## 2020-04-29 RX ADMIN — NICARDIPINE HYDROCHLORIDE PRN MLS/HR: 0.1 INJECTION, SOLUTION INTRAVENOUS at 12:25

## 2020-04-29 RX ADMIN — MINERAL OIL AND WHITE PETROLATUM SCH: 150; 830 OINTMENT OPHTHALMIC at 13:56

## 2020-04-29 RX ADMIN — DIBASIC SODIUM PHOSPHATE, MONOBASIC POTASSIUM PHOSPHATE AND MONOBASIC SODIUM PHOSPHATE SCH MG: 852; 155; 130 TABLET ORAL at 23:03

## 2020-04-29 RX ADMIN — DIBASIC SODIUM PHOSPHATE, MONOBASIC POTASSIUM PHOSPHATE AND MONOBASIC SODIUM PHOSPHATE SCH MG: 852; 155; 130 TABLET ORAL at 17:56

## 2020-04-29 RX ADMIN — OXYCODONE HYDROCHLORIDE AND ACETAMINOPHEN SCH MG: 500 TABLET ORAL at 17:56

## 2020-04-29 RX ADMIN — INSULIN HUMAN SCH UNIT: 100 INJECTION, SOLUTION PARENTERAL at 17:55

## 2020-04-29 RX ADMIN — ENOXAPARIN SODIUM SCH: 100 INJECTION SUBCUTANEOUS at 09:17

## 2020-04-29 RX ADMIN — INSULIN GLARGINE SCH UNIT: 100 INJECTION, SOLUTION SUBCUTANEOUS at 17:57

## 2020-04-29 RX ADMIN — Medication SCH: at 22:49

## 2020-04-29 RX ADMIN — INSULIN HUMAN SCH: 100 INJECTION, SOLUTION PARENTERAL at 05:54

## 2020-04-29 RX ADMIN — NICARDIPINE HYDROCHLORIDE PRN MLS/HR: 0.1 INJECTION, SOLUTION INTRAVENOUS at 22:56

## 2020-04-29 RX ADMIN — Medication SCH MG: at 22:49

## 2020-04-29 RX ADMIN — INSULIN HUMAN SCH: 100 INJECTION, SOLUTION PARENTERAL at 14:51

## 2020-04-29 RX ADMIN — OXYCODONE HYDROCHLORIDE AND ACETAMINOPHEN SCH MG: 500 TABLET ORAL at 09:25

## 2020-04-29 RX ADMIN — METHOCARBAMOL SCH MG: 500 TABLET ORAL at 22:49

## 2020-04-29 RX ADMIN — Medication SCH MG: at 09:25

## 2020-04-29 RX ADMIN — DIBASIC SODIUM PHOSPHATE, MONOBASIC POTASSIUM PHOSPHATE AND MONOBASIC SODIUM PHOSPHATE SCH MG: 852; 155; 130 TABLET ORAL at 12:26

## 2020-04-29 RX ADMIN — NICARDIPINE HYDROCHLORIDE ONE: 0.1 INJECTION, SOLUTION INTRAVENOUS at 15:31

## 2020-04-29 RX ADMIN — HYDROCORTISONE SODIUM SUCCINATE SCH MG: 100 INJECTION, POWDER, FOR SOLUTION INTRAMUSCULAR; INTRAVENOUS at 05:51

## 2020-04-29 RX ADMIN — FAMOTIDINE SCH MG: 20 TABLET, FILM COATED ORAL at 22:50

## 2020-04-29 RX ADMIN — NICARDIPINE HYDROCHLORIDE ONE MLS/HR: 0.1 INJECTION, SOLUTION INTRAVENOUS at 12:25

## 2020-04-29 RX ADMIN — MINERAL OIL AND WHITE PETROLATUM SCH: 150; 830 OINTMENT OPHTHALMIC at 05:52

## 2020-04-29 RX ADMIN — Medication SCH: at 14:48

## 2020-04-29 RX ADMIN — NICARDIPINE HYDROCHLORIDE PRN MLS/HR: 0.1 INJECTION, SOLUTION INTRAVENOUS at 19:30

## 2020-04-29 RX ADMIN — MORPHINE SULFATE PRN MLS/HR: 1 INJECTION, SOLUTION INTRAVENOUS at 08:28

## 2020-04-29 RX ADMIN — HYDROCORTISONE SODIUM SUCCINATE SCH MG: 100 INJECTION, POWDER, FOR SOLUTION INTRAMUSCULAR; INTRAVENOUS at 14:48

## 2020-04-29 RX ADMIN — INSULIN GLARGINE SCH: 100 INJECTION, SOLUTION SUBCUTANEOUS at 09:24

## 2020-04-29 RX ADMIN — INSULIN HUMAN SCH UNIT: 100 INJECTION, SOLUTION PARENTERAL at 22:45

## 2020-04-29 NOTE — PDOC CRITICAL CARE PROG REPORT
General


Date:: 04/29/20


ICU Day:: 19


Ventilator Day:: 19


Resuscitation Status: Full Code


Medical Power of : DaughterMaryuri


Events in the past 12 to 24 Hours:: 





4/26


Patient had significant bradycardia today with prolonged period of asystole and 

frequent periods of ventricular rhythm in the 40s.  Cardiology consult was 

placed.  Transcutaneous pacing pads placed on patient, we will plan to keep 

atropine, dopamine and isoproterenol on call if an emergent situation arises.  A

right IJ Cordis was placed to facilitate the placement of transvenous pacing 

wires showed an emergent need be presented.





4/27


Overnight, patient had multiple episodes of bradycardia and asystole requiring 1

dose of atropine.  Patient also had some bleeding from the new Cordis site.  

Follow-up CBC showed an acute anemia.  2 units PRBC have been ordered.





4/28


Patient again has had multiple episodes of bradycardia and asystole over the 

past 24 hours.  Bleeding from Cordis site was controlled with localized 

pressure.  CBC improved significantly.  Patient has a endotracheal cuff leak 

which is requiring 5 to 10 cc of air to be added twice a day.  Plan will be to 

replace ET tube if cuff leak worsens.





4/29


Still with occasional episodes of bradycardia and asystole.  Unable to make any 

significant ventilator changes without desaturation.  She unfortunately still 

requires an FiO2 greater than 90%.  Repeat COVID tests are pending.  We will 

change Solu-Cortef to every 12 and continue to wean.


Review of systems relevant to events:: 





Please see complete review of systems below.


Reason for ICU Addmission:: Acute hypoxic respiratory failure with SARS, 2-

CoViD19. Intubated. Supine.





- Medications:


Medications reviewed and adjusted accordingly: Yes


Sedation:: 


Patient remains on Versed at 10 and morphine at 5 mg/h.








Physical Exam


Vital Signs: 


                                        











Temp Pulse Resp BP Pulse Ox


 


 97.7 F   95   18   164/85 H  89 L


 


 04/29/20 12:00  04/29/20 08:00  04/29/20 12:00  04/29/20 11:58  04/29/20 12:00








                                 Intake & Output











 04/28/20 04/29/20 04/30/20





 06:59 06:59 06:59


 


Intake Total 2306 917 553


 


Output Total 1436 5600 475


 


Balance 1171 -923 78


 


Weight 124.3 kg 127.1 kg 








                                  Weight/Height





Weight                           127.1 kg


Height                           5 ft 3 in








General appearance: PRESENT: no acute distress, morbidly obese


Head exam: PRESENT: atraumatic


Eye exam: PRESENT: periorbital swelling


Mouth exam: PRESENT: moist, neck supple


Neck exam: PRESENT: full ROM


Respiratory exam: PRESENT: decreased breath sounds, symmetrical.  ABSENT: 

rhonchi, stridor, wheezes


Cardiovascular exam: PRESENT: bradycardia, irregular rhythm, tachycardia


Pulses: PRESENT: normal carotid pulses, normal radial pulses, +1 pedal pulses 

bilateral


Vascular exam: PRESENT: normal capillary refill


GI/Abdominal exam: PRESENT: ascites, diminished bowel sounds, soft


Musculoskeletal exam: PRESENT: normal inspection


Neurological exam: PRESENT: CN II-XII grossly intact


Skin exam: PRESENT: warm.  ABSENT: abrasion, rash


Tubes/Lines: PRESENT: Endotracheal Tube, Central Line





Laboratory/Radiographs


Laboratory Results: 


                                        





                                 04/29/20 00:48 





                                 04/29/20 00:48 





                                        











  04/29/20 04/29/20 04/29/20





  00:48 00:48 00:48


 


WBC    12.7 H


 


RBC    3.79


 


Hgb    11.1 L


 


Hct    33.0 L


 


MCV    87


 


MCH    29.3


 


MCHC    33.7


 


RDW    17.2 H


 


Plt Count    87 L


 


Seg Neutrophils %    Not Reportable


 


Carbonic Acid  1.31  


 


HCO3/H2CO3 Ratio  22:1  


 


ABG pH  7.44  


 


ABG pCO2  43.6  


 


ABG pO2  64.2 L  


 


ABG HCO3  28.9 H  


 


ABG O2 Saturation  93.1 L  


 


ABG Base Excess  4.3  


 


FiO2  95%  


 


Sodium   149.3 H 


 


Potassium   3.6 


 


Chloride   112 H 


 


Carbon Dioxide   35 H 


 


Anion Gap   2 L 


 


BUN   36 H 


 


Creatinine   0.66 


 


Est GFR ( Amer)   > 60 


 


Glucose   94 


 


Calcium   8.5 


 


Phosphorus   3.0 


 


Magnesium   2.2 














  04/29/20





  06:38


 


WBC 


 


RBC 


 


Hgb 


 


Hct 


 


MCV 


 


MCH 


 


MCHC 


 


RDW 


 


Plt Count 


 


Seg Neutrophils % 


 


Carbonic Acid  2.03 H


 


HCO3/H2CO3 Ratio  18:1


 


ABG pH  7.36


 


ABG pCO2  67.4 H


 


ABG pO2  62.2 L


 


ABG HCO3  37.3 H


 


ABG O2 Saturation  90.1 L


 


ABG Base Excess  9.5


 


FiO2  95%


 


Sodium 


 


Potassium 


 


Chloride 


 


Carbon Dioxide 


 


Anion Gap 


 


BUN 


 


Creatinine 


 


Est GFR (African Amer) 


 


Glucose 


 


Calcium 


 


Phosphorus 


 


Magnesium 








                                        











  04/05/20 04/10/20 04/10/20





  14:00 11:48 11:48


 


Creatine Kinase   105 


 


CK-MB (CK-2)    0.87


 


Troponin I    < 0.012


 


NT-Pro-B Natriuret Pep  32  














  04/10/20 04/10/20 04/11/20





  22:45 22:45 04:45


 


Creatine Kinase  60   46


 


CK-MB (CK-2)   0.41 


 


Troponin I   0.018 


 


NT-Pro-B Natriuret Pep   














  04/11/20





  04:45


 


Creatine Kinase 


 


CK-MB (CK-2)  0.47


 


Troponin I  < 0.012


 


NT-Pro-B Natriuret Pep 











Impressions: 


                                        





Chest/Abdomen CTA  04/05/20 16:08


IMPRESSION:


1. Patchy peripheral and perihilar ground-glass opacities in both lungs.  These 

are commonly reported imaging features of COVID-19   pneumonia are present. 

Other processes such as influenza pneumonia and organizing pneumonia, as can be 

seen with drug toxicity and connective tissue disease, can cause a similar 

imaging pattern. PneTyp


2. Limited evaluation of the pulmonary arteries due to contrast bolus timing.  

No large central pulmonary embolus.  Evaluation of the segmental and subsegmenta

l pulmonary arteries is limited.


 








Chest X-Ray  04/26/20 00:00


IMPRESSION:  NO PNEUMOTHORAX FOLLOWING CENTRAL LINE PLACEMENT.  OVERALL NO 

SIGNIFICANT INTERVAL CHANGE IN APPEARANCE OF THE CHEST.


 











All labs, radiographs, diagnostic studies and EKGs were personally reviewed: Yes


In addition, reports of radiographic and diagnostic studies were read: Yes





Assessment and Plan





- Diagnosis


(1) Acute hypoxemic respiratory failure


Is this a current diagnosis for this admission?: Yes   


Plan: 


Attempts were made to optimize the patient's ventilation today, however we were 

unable to make any significant changes without a decrease in SPO2.  Patient 

remains on pressure control with an inspiratory pressure of 30, PEEP 8, r

espiratory rate 16, 100% FiO2 to maintain SPO2 of 95%.








(2) Asystole


Is this a current diagnosis for this admission?: Yes   


Plan: 


Less frequent episodes of asystole/bradycardia today.  She did require 

transdermal pacing overnight.  Cordis in place if transvenous pacer needed.








(3) Suspected COVID-19 virus infection


Is this a current diagnosis for this admission?: Yes   


Plan: 


Patient with persistent respiratory failure in secondary to COVID-19 virus 

infection.  Repeat COVID test sent out yesterday.  Results are pending.





Plan Summary: 


ICU day: 19





Neuro: Patient remains responsive only to discomfort.  She is on Versed at 10 

and morphine at 5 mg/h.  We will set a regimen of lightening her sedation daily,

however, she will need to be sedated until her AA gradient improves and she 

requires less aggressive support on the ventilator.


Pulmonary: Patient remains on pressure control ventilation with inspiratory 

pressure 30, inspiratory time: 2.6 seconds, PEEP 8, respiratory rate 15, FiO2 

100%.  We have had difficulty over the past several days weaning her FiO2 and 

inspiratory time due to hypoxemia.  This problem is compounded by her cardiac 

rhythm instability.  We are weaning the Solu-Cortef and it was changed to 25 mg 

IV every 12 hours.  Patient's lung compliance remains adequate.  Her respiratory

failure secondary to COVID-19 infection seems typical for this virus and is 

primarily hypoxemic.  Of note, patient has developed a small endotracheal cuff 

air leak which is requiring 5 cc of air to be replaced occasionally.  If airleak

gets any worse, ET tube will need to be replaced. If not urgent to change the 

ETT, we will wait to see if repeat COVID tests come back negative. 


Cardiovascular: Less frequent episodes of tachycardia/bradycardia today.  

Vascular pacing wires have been on standby and not placed due to their potential

for causing further arrhythmias.  Pacing mode available is set pacing only and 

does not sense.  We will therefore only use intervascular pacing if patient has 

persistent asystole or complete heart block.


       Indwelling catheters: Cortis in the RIJ and TLC in RSC. No other signs of

bleeding.


Heme: As above, H&H has improved significantly.  Now 11.1/33.7.


      DVT prophylaxis: Okay to resume Lovenox with no further bleeding of cordis

site.


Renal: Renal panel has been stable.  Mildly elevated sodium level.  BUN and 

creatinine are within normal limits.


Gastrointestinal: Tube feeds restarted.  No significant residuals.


      GI prophylaxis: Continue GI prophylaxis with Pepcid 20 mg p.o. every 12 

hours.


Current: Patient with a history of DM 2, on regular insulin sliding scale as 

well as Lantus.  Tube feeds restarted.


: Ervin catheter in place.  No signs of infection.


ID: WBC trended down today, no signs of obvious infection.  No antibiotics at 

this time.  We will investigate further and start antibiotics empirically if 

patient shows signs of infection including fever.





Barriers to discharge from ICU: Patient still with COVID-19 related respiratory 

failure and severe refractory hypoxemia despite adequate ventilation.  Now with 

cardiac rhythm instability.











Critical Time


Critical Time (minutes): 70


Level of Care: ICU


-: 


1.  The care of a critical patient is a dynamic process.  This note is a 

representative synopsis but static in nature.  The timeframe for treatments 

given in order is not necessarily the actual time these treatments may have been

done.





2.  This patient requires critical care secondary to ongoing requirements for 

therapy not offered or safe outside the critical care environment.  Transfer to 

a lower level of care will result in altered life or limb morbidity and 

mortality.





3.  Multidisciplinary rounds completed.





4.  ABCDE bundle addressed.

## 2020-04-30 LAB
ABSOLUTE LYMPHOCYTES# (MANUAL): 0.1 10^3/UL (ref 0.5–4.7)
ABSOLUTE MONOCYTES # (MANUAL): 0.4 10^3/UL (ref 0.1–1.4)
ADD MANUAL DIFF: YES
ANION GAP SERPL CALC-SCNC: 2 MMOL/L (ref 5–19)
ANISOCYTOSIS BLD QL SMEAR: (no result)
APPEARANCE UR: (no result)
APTT PPP: YELLOW S
ARTERIAL BLOOD H2CO3: 1.76 MMOL/L (ref 1.05–1.35)
ARTERIAL BLOOD HCO3: 30.5 MMOL/L (ref 20–24)
ARTERIAL BLOOD PCO2: 58.5 MMHG (ref 35–45)
ARTERIAL BLOOD PH: 7.34 (ref 7.35–7.45)
ARTERIAL BLOOD PO2: 66.1 MMHG (ref 80–100)
ARTERIAL BLOOD TOTAL CO2: 32.3 MMOL/L (ref 21–25)
BASE EXCESS BLDA CALC-SCNC: 3.1 MMOL/L
BASOPHILS NFR BLD MANUAL: 0 % (ref 0–2)
BILIRUB UR QL STRIP: NEGATIVE
BUN SERPL-MCNC: 38 MG/DL (ref 7–20)
CALCIUM: 8.2 MG/DL (ref 8.4–10.2)
CHLORIDE SERPL-SCNC: 112 MMOL/L (ref 98–107)
CO2 SERPL-SCNC: 34 MMOL/L (ref 22–30)
EOSINOPHIL NFR BLD MANUAL: 0 % (ref 0–6)
ERYTHROCYTE [DISTWIDTH] IN BLOOD BY AUTOMATED COUNT: 17.6 % (ref 11.5–14)
GLUCOSE SERPL-MCNC: 181 MG/DL (ref 75–110)
GLUCOSE UR STRIP-MCNC: NEGATIVE MG/DL
HCT VFR BLD CALC: 32.3 % (ref 36–47)
HGB BLD-MCNC: 10.8 G/DL (ref 12–15.5)
KETONES UR STRIP-MCNC: NEGATIVE MG/DL
MCH RBC QN AUTO: 29.3 PG (ref 27–33.4)
MCHC RBC AUTO-ENTMCNC: 33.3 G/DL (ref 32–36)
MCV RBC AUTO: 88 FL (ref 80–97)
MONOCYTES % (MANUAL): 3 % (ref 3–13)
NITRITE UR QL STRIP: NEGATIVE
PH UR STRIP: 5 [PH] (ref 5–9)
PHOSPHATE SERPL-MCNC: 4.1 MG/DL (ref 2.5–4.5)
PLATELET # BLD: 92 10^3/UL (ref 150–450)
PLATELET COMMENT: (no result)
POTASSIUM SERPL-SCNC: 3.8 MMOL/L (ref 3.6–5)
PROT UR STRIP-MCNC: 30 MG/DL
RBC # BLD AUTO: 3.67 10^6/UL (ref 3.72–5.28)
SAO2 % BLDA: 91.4 % (ref 94–98)
SEGMENTED NEUTROPHILS % (MAN): 96 % (ref 42–78)
SP GR UR STRIP: 1.02
TOTAL CELLS COUNTED BLD: 100
UROBILINOGEN UR-MCNC: 4 MG/DL (ref ?–2)
VARIANT LYMPHS NFR BLD MANUAL: 1 % (ref 13–45)
WBC # BLD AUTO: 12.6 10^3/UL (ref 4–10.5)

## 2020-04-30 RX ADMIN — INSULIN HUMAN SCH UNIT: 100 INJECTION, SOLUTION PARENTERAL at 01:37

## 2020-04-30 RX ADMIN — IMIPENEM AND CILASTATIN SODIUM SCH MLS/HR: 500; 500 INJECTION, POWDER, FOR SOLUTION INTRAVENOUS at 15:00

## 2020-04-30 RX ADMIN — NICARDIPINE HYDROCHLORIDE PRN MLS/HR: 0.1 INJECTION, SOLUTION INTRAVENOUS at 00:56

## 2020-04-30 RX ADMIN — INSULIN HUMAN SCH: 100 INJECTION, SOLUTION PARENTERAL at 14:59

## 2020-04-30 RX ADMIN — METHOCARBAMOL SCH MG: 500 TABLET ORAL at 14:59

## 2020-04-30 RX ADMIN — ENOXAPARIN SODIUM SCH MG: 100 INJECTION SUBCUTANEOUS at 21:22

## 2020-04-30 RX ADMIN — MORPHINE SULFATE PRN MLS/HR: 1 INJECTION, SOLUTION INTRAVENOUS at 08:00

## 2020-04-30 RX ADMIN — INSULIN GLARGINE SCH UNIT: 100 INJECTION, SOLUTION SUBCUTANEOUS at 10:52

## 2020-04-30 RX ADMIN — IMIPENEM AND CILASTATIN SODIUM SCH MLS/HR: 500; 500 INJECTION, POWDER, FOR SOLUTION INTRAVENOUS at 21:22

## 2020-04-30 RX ADMIN — METHOCARBAMOL SCH MG: 500 TABLET ORAL at 05:18

## 2020-04-30 RX ADMIN — ENOXAPARIN SODIUM SCH MG: 100 INJECTION SUBCUTANEOUS at 10:52

## 2020-04-30 RX ADMIN — NICARDIPINE HYDROCHLORIDE PRN MLS/HR: 0.1 INJECTION, SOLUTION INTRAVENOUS at 21:25

## 2020-04-30 RX ADMIN — METHOCARBAMOL SCH MG: 500 TABLET ORAL at 21:23

## 2020-04-30 RX ADMIN — NICARDIPINE HYDROCHLORIDE PRN MLS/HR: 0.1 INJECTION, SOLUTION INTRAVENOUS at 05:16

## 2020-04-30 RX ADMIN — MINERAL OIL AND WHITE PETROLATUM SCH APPLIC: 150; 830 OINTMENT OPHTHALMIC at 21:23

## 2020-04-30 RX ADMIN — HYDROCORTISONE SODIUM SUCCINATE SCH MG: 100 INJECTION, POWDER, FOR SOLUTION INTRAMUSCULAR; INTRAVENOUS at 05:17

## 2020-04-30 RX ADMIN — FAMOTIDINE SCH MG: 20 TABLET, FILM COATED ORAL at 10:51

## 2020-04-30 RX ADMIN — IMIPENEM AND CILASTATIN SODIUM SCH MLS/HR: 500; 500 INJECTION, POWDER, FOR SOLUTION INTRAVENOUS at 08:00

## 2020-04-30 RX ADMIN — Medication SCH: at 15:00

## 2020-04-30 RX ADMIN — NICARDIPINE HYDROCHLORIDE PRN MLS/HR: 0.1 INJECTION, SOLUTION INTRAVENOUS at 17:30

## 2020-04-30 RX ADMIN — NICARDIPINE HYDROCHLORIDE PRN MLS/HR: 0.1 INJECTION, SOLUTION INTRAVENOUS at 08:55

## 2020-04-30 RX ADMIN — INSULIN HUMAN SCH: 100 INJECTION, SOLUTION PARENTERAL at 10:50

## 2020-04-30 RX ADMIN — INSULIN HUMAN SCH UNIT: 100 INJECTION, SOLUTION PARENTERAL at 05:16

## 2020-04-30 RX ADMIN — NICARDIPINE HYDROCHLORIDE PRN MLS/HR: 0.1 INJECTION, SOLUTION INTRAVENOUS at 13:11

## 2020-04-30 RX ADMIN — MORPHINE SULFATE PRN MLS/HR: 1 INJECTION, SOLUTION INTRAVENOUS at 19:30

## 2020-04-30 RX ADMIN — FAMOTIDINE SCH MG: 20 TABLET, FILM COATED ORAL at 21:23

## 2020-04-30 RX ADMIN — HYDROCORTISONE SODIUM SUCCINATE SCH MG: 100 INJECTION, POWDER, FOR SOLUTION INTRAMUSCULAR; INTRAVENOUS at 14:59

## 2020-04-30 RX ADMIN — Medication SCH MG: at 10:50

## 2020-04-30 RX ADMIN — INSULIN GLARGINE SCH UNIT: 100 INJECTION, SOLUTION SUBCUTANEOUS at 18:03

## 2020-04-30 RX ADMIN — MINERAL OIL AND WHITE PETROLATUM SCH: 150; 830 OINTMENT OPHTHALMIC at 15:01

## 2020-04-30 RX ADMIN — VITAMIN D, TAB 1000IU (100/BT) SCH UNIT: 25 TAB at 10:51

## 2020-04-30 RX ADMIN — MINERAL OIL AND WHITE PETROLATUM SCH: 150; 830 OINTMENT OPHTHALMIC at 05:17

## 2020-04-30 RX ADMIN — INSULIN HUMAN SCH: 100 INJECTION, SOLUTION PARENTERAL at 21:28

## 2020-04-30 RX ADMIN — OXYCODONE HYDROCHLORIDE AND ACETAMINOPHEN SCH MG: 500 TABLET ORAL at 18:03

## 2020-04-30 RX ADMIN — HYDROCORTISONE SODIUM SUCCINATE SCH MG: 100 INJECTION, POWDER, FOR SOLUTION INTRAMUSCULAR; INTRAVENOUS at 21:34

## 2020-04-30 RX ADMIN — Medication SCH: at 21:23

## 2020-04-30 RX ADMIN — OXYCODONE HYDROCHLORIDE AND ACETAMINOPHEN SCH MG: 500 TABLET ORAL at 10:51

## 2020-04-30 RX ADMIN — Medication SCH: at 05:17

## 2020-04-30 RX ADMIN — Medication SCH MG: at 21:23

## 2020-04-30 RX ADMIN — INSULIN HUMAN SCH: 100 INJECTION, SOLUTION PARENTERAL at 18:02

## 2020-04-30 NOTE — PDOC CRITICAL CARE PROG REPORT
General


Date:: 04/30/20


ICU Day:: 20


Ventilator Day:: 20


Resuscitation Status: Full Code


Medical Power of : DaughterMaryuri


Events in the past 12 to 24 Hours:: 





4/26


Patient had significant bradycardia today with prolonged period of asystole and 

frequent periods of ventricular rhythm in the 40s.  Cardiology consult was 

placed.  Transcutaneous pacing pads placed on patient, we will plan to keep 

atropine, dopamine and isoproterenol on call if an emergent situation arises.  A

right IJ Cordis was placed to facilitate the placement of transvenous pacing 

wires showed an emergent need be presented.





4/27


Overnight, patient had multiple episodes of bradycardia and asystole requiring 1

dose of atropine.  Patient also had some bleeding from the new Cordis site.  

Follow-up CBC showed an acute anemia.  2 units PRBC have been ordered.





4/28


Patient again has had multiple episodes of bradycardia and asystole over the 

past 24 hours.  Bleeding from Cordis site was controlled with localized 

pressure.  CBC improved significantly.  Patient has a endotracheal cuff leak 

which is requiring 5 to 10 cc of air to be added twice a day.  Plan will be to 

replace ET tube if cuff leak worsens.





4/29


Still with occasional episodes of bradycardia and asystole.  Unable to make any 

significant ventilator changes without desaturation.  She unfortunately still 

requires an FiO2 greater than 90%.  Repeat COVID tests are pending.  We will 

change Solu-Cortef to every 12 and continue to wean.





4/30:


Less frequent episodes of bradycardia and asystole.  Large amount of sediment 

seen in urine.  UA showed 3+ bacteria.  Patient empirically started on 

imipenem/cilastatin.  Urine culture sent.  Attempts being made today again to t

ry to reduce her FiO2.  First repeat COVID test is negative.  Solu-Cortef 

changed to every 12.


Review of systems relevant to events:: 





Please see complete review of systems below.


Reason for ICU Addmission:: Acute hypoxic respiratory failure with SARS, 2-

CoViD19. Intubated. Supine.





- Medications:


Medications reviewed and adjusted accordingly: Yes





Physical Exam


Vital Signs: 


                                        











Temp Pulse Resp BP Pulse Ox


 


 96.1 F L  100   4 L  161/90 H  94 


 


 04/30/20 14:45  04/30/20 14:00  04/30/20 14:45  04/30/20 14:43  04/30/20 14:45








                                 Intake & Output











 04/29/20 04/30/20 05/01/20





 06:59 06:59 06:59


 


Intake Total 917 2443 350


 


Output Total 1840 1450 500


 


Balance -923 993 -150


 


Weight 127.1 kg 124.5 kg 








                                  Weight/Height





Weight                           124.5 kg


Height                           5 ft 3 in








General appearance: PRESENT: obese


Head exam: PRESENT: atraumatic, normocephalic


Eye exam: PRESENT: periorbital swelling, PERRLA


Ear exam: PRESENT: normal external ear exam


Mouth exam: PRESENT: moist


Neck exam: PRESENT: full ROM


Respiratory exam: PRESENT: decreased breath sounds, symmetrical.  ABSENT: 

rhonchi, wheezes


Cardiovascular exam: PRESENT: bradycardia, irregular rhythm, tachycardia


Pulses: PRESENT: +1 pedal pulses bilateral


GI/Abdominal exam: PRESENT: normal bowel sounds, soft


Extremities exam: PRESENT: +1 edema


Musculoskeletal exam: PRESENT: normal inspection


Neurological exam: PRESENT: CN II-XII grossly intact


Skin exam: PRESENT: normal color, warm


Tubes/Lines: PRESENT: Endotracheal Tube, Central Line





Laboratory/Radiographs


Laboratory Results: 


                                        





                                 04/30/20 03:51 





                                 04/30/20 03:51 





                                        











  04/30/20 04/30/20 04/30/20





  03:51 03:51 03:51


 


WBC    12.6 H


 


RBC    3.67 L


 


Hgb    10.8 L


 


Hct    32.3 L


 


MCV    88


 


MCH    29.3


 


MCHC    33.3


 


RDW    17.6 H


 


Plt Count    92 L


 


Seg Neutrophils %    Not Reportable


 


Carbonic Acid  1.76 H  


 


HCO3/H2CO3 Ratio  17:1  


 


ABG pH  7.34 L  


 


ABG pCO2  58.5 H  


 


ABG pO2  66.1 L  


 


ABG HCO3  30.5 H  


 


ABG O2 Saturation  91.4 L  


 


ABG Base Excess  3.1  


 


FiO2  95%  


 


Sodium   148.0 H 


 


Potassium   3.8 


 


Chloride   112 H 


 


Carbon Dioxide   34 H 


 


Anion Gap   2 L 


 


BUN   38 H 


 


Creatinine   0.67 


 


Est GFR ( Amer)   > 60 


 


Glucose   181 H 


 


Calcium   8.2 L 


 


Phosphorus   4.1 


 


Urine Color   


 


Urine Appearance   


 


Urine pH   


 


Ur Specific Gravity   


 


Urine Protein   


 


Urine Glucose (UA)   


 


Urine Ketones   


 


Urine Blood   


 


Urine Nitrite   


 


Ur Leukocyte Esterase   


 


Urine WBC (Auto)   


 


Urine RBC (Auto)   














  04/30/20





  05:44


 


WBC 


 


RBC 


 


Hgb 


 


Hct 


 


MCV 


 


MCH 


 


MCHC 


 


RDW 


 


Plt Count 


 


Seg Neutrophils % 


 


Carbonic Acid 


 


HCO3/H2CO3 Ratio 


 


ABG pH 


 


ABG pCO2 


 


ABG pO2 


 


ABG HCO3 


 


ABG O2 Saturation 


 


ABG Base Excess 


 


FiO2 


 


Sodium 


 


Potassium 


 


Chloride 


 


Carbon Dioxide 


 


Anion Gap 


 


BUN 


 


Creatinine 


 


Est GFR (African Amer) 


 


Glucose 


 


Calcium 


 


Phosphorus 


 


Urine Color  YELLOW


 


Urine Appearance  CLOUDY


 


Urine pH  5.0


 


Ur Specific Gravity  1.020


 


Urine Protein  30 H


 


Urine Glucose (UA)  NEGATIVE


 


Urine Ketones  NEGATIVE


 


Urine Blood  MODERATE H


 


Urine Nitrite  NEGATIVE


 


Ur Leukocyte Esterase  LARGE H


 


Urine WBC (Auto)  121


 


Urine RBC (Auto)  137








                                        











  04/05/20 04/10/20 04/10/20





  14:00 11:48 11:48


 


Creatine Kinase   105 


 


CK-MB (CK-2)    0.87


 


Troponin I    < 0.012


 


NT-Pro-B Natriuret Pep  32  














  04/10/20 04/10/20 04/11/20





  22:45 22:45 04:45


 


Creatine Kinase  60   46


 


CK-MB (CK-2)   0.41 


 


Troponin I   0.018 


 


NT-Pro-B Natriuret Pep   














  04/11/20





  04:45


 


Creatine Kinase 


 


CK-MB (CK-2)  0.47


 


Troponin I  < 0.012


 


NT-Pro-B Natriuret Pep 











Impressions: 


                                        





Chest/Abdomen CTA  04/05/20 16:08


IMPRESSION:


1. Patchy peripheral and perihilar ground-glass opacities in both lungs.  These 

are commonly reported imaging features of COVID-19   pneumonia are present. 

Other processes such as influenza pneumonia and organizing pneumonia, as can be 

seen with drug toxicity and connective tissue disease, can cause a similar elmer

ging pattern. PneTyp


2. Limited evaluation of the pulmonary arteries due to contrast bolus timing.  

No large central pulmonary embolus.  Evaluation of the segmental and 

subsegmental pulmonary arteries is limited.


 








Chest X-Ray  04/26/20 00:00


IMPRESSION:  NO PNEUMOTHORAX FOLLOWING CENTRAL LINE PLACEMENT.  OVERALL NO 

SIGNIFICANT INTERVAL CHANGE IN APPEARANCE OF THE CHEST.


 











All labs, radiographs, diagnostic studies and EKGs were personally reviewed: Yes


In addition, reports of radiographic and diagnostic studies were read: Yes





Assessment and Plan





- Diagnosis


(1) Acute hypoxemic respiratory failure


Is this a current diagnosis for this admission?: Yes   


Plan: 


Attempts were made to optimize the patient's ventilation again today, however we

were unable to make any significant changes without a decrease in SPO2.  Patient

remains on pressure control with an inspiratory pressure of 30, PEEP 8, 

respiratory rate 16, 100% FiO2 to maintain SPO2 of 95%.








(2) Asystole


Is this a current diagnosis for this admission?: Yes   


Plan: 


Less frequent episodes of asystole/bradycardia today. Cordis in place if 

transvenous pacer needed.








(3) Suspected COVID-19 virus infection


Is this a current diagnosis for this admission?: Yes   


Plan: 


Patient with persistent respiratory failure in secondary to COVID-19 virus 

infection.  Repeat COVID test came back today is negative.





Plan Summary: 


ICU day: 20





Neuro: Patient remains responsive only to discomfort.  She is on Versed at 10 

and morphine at 5 mg/h.  We will set a regimen of lightening her sedation daily,

however, she will need to be sedated until her AA gradient improves and she 

requires less aggressive support on the ventilator.


Pulmonary: Patient remains on pressure control ventilation with inspiratory p

ressure 30, inspiratory time: 2.6 seconds, PEEP 8, respiratory rate 15, FiO2 

100%.  We have had difficulty over the past several days weaning her FiO2 and 

inspiratory time due to hypoxemia.  This problem is compounded by her cardiac 

rhythm instability.  We are weaning the Solu-Cortef and it was changed to 25 mg 

IV every 12 hours.  Patient's lung compliance remains adequate.  Her respiratory

failure secondary to COVID-19 infection seems typical for this virus and is 

primarily hypoxemic.  Of note, patient has developed a small endotracheal cuff 

air leak which is requiring 5 cc of air to be replaced occasionally.  If airleak

gets any worse, ET tube will need to be replaced. If not urgent to change the 

ETT, we will wait to see if repeat COVID tests come back negative.  First test 

negative so far.


Cardiovascular: Less frequent episodes of tachycardia/bradycardia today.  

Vascular pacing wires have been on standby and not placed due to their potential

for causing further arrhythmias.  Pacing mode available is set pacing only and 

does not sense.  We will therefore only use intervascular pacing if patient has 

persistent asystole or complete heart block.


       Indwelling catheters: Cortis in the RIJ and TLC in RSC. No other signs of

bleeding.


Heme: As above, H&H has improved significantly.  Now 11.1/33.7.


      DVT prophylaxis: Okay to resume Lovenox with no further bleeding of cordis

site.


Renal: Renal panel has been stable.  Mildly elevated sodium level.  BUN and 

creatinine are within normal limits.


Gastrointestinal: Tube feeds restarted.  No significant residuals.


      GI prophylaxis: Continue GI prophylaxis with Pepcid 20 mg p.o. every 12 

hours.


Current: Patient with a history of DM 2, on regular insulin sliding scale as 

well as Lantus.  Tube feeds restarted.


: Ervin catheter in place.  Purulent discharge from Ervin catheter yesterday. 

Ervin catheter was changed and imipenem/cilastatin was started.


ID: WBC is stable at 12.6.  No fever today.  Antibiotics started as above for 

empiric treatment of UTI.





Barriers to discharge from ICU: Patient still with COVID-19 related respiratory 

failure and severe refractory hypoxemia despite adequate ventilation.  Now with 

cardiac rhythm instability. 











Critical Time


Critical Time (minutes): 65


Level of Care: ICU


-: 


1.  The care of a critical patient is a dynamic process.  This note is a 

representative synopsis but static in nature.  The timeframe for treatments 

given in order is not necessarily the actual time these treatments may have been

done.





2.  This patient requires critical care secondary to ongoing requirements for 

therapy not offered or safe outside the critical care environment.  Transfer to 

a lower level of care will result in altered life or limb morbidity and 

mortality.





3.  Multidisciplinary rounds completed.





4.  ABCDE bundle addressed.

## 2020-05-01 VITALS — DIASTOLIC BLOOD PRESSURE: 22 MMHG | SYSTOLIC BLOOD PRESSURE: 135 MMHG

## 2020-05-01 LAB
ABSOLUTE LYMPHOCYTES# (MANUAL): 0.2 10^3/UL (ref 0.5–4.7)
ABSOLUTE MONOCYTES # (MANUAL): 0.3 10^3/UL (ref 0.1–1.4)
ADD MANUAL DIFF: YES
ANION GAP SERPL CALC-SCNC: 0 MMOL/L (ref 5–19)
ANISOCYTOSIS BLD QL SMEAR: (no result)
ARTERIAL BLOOD FIO2: (no result)
ARTERIAL BLOOD H2CO3: 1.78 MMOL/L (ref 1.05–1.35)
ARTERIAL BLOOD HCO3: 29 MMOL/L (ref 20–24)
ARTERIAL BLOOD PCO2: 59 MMHG (ref 35–45)
ARTERIAL BLOOD PH: 7.31 (ref 7.35–7.45)
ARTERIAL BLOOD PO2: 59 MMHG (ref 80–100)
ARTERIAL BLOOD TOTAL CO2: 30.8 MMOL/L (ref 21–25)
BASE EXCESS BLDA CALC-SCNC: 1.7 MMOL/L
BASOPHILS NFR BLD MANUAL: 0 % (ref 0–2)
BUN SERPL-MCNC: 35 MG/DL (ref 7–20)
CALCIUM: 8.1 MG/DL (ref 8.4–10.2)
CHLORIDE SERPL-SCNC: 112 MMOL/L (ref 98–107)
CO2 SERPL-SCNC: 35 MMOL/L (ref 22–30)
EOSINOPHIL NFR BLD MANUAL: 0 % (ref 0–6)
ERYTHROCYTE [DISTWIDTH] IN BLOOD BY AUTOMATED COUNT: 18 % (ref 11.5–14)
GLUCOSE SERPL-MCNC: 140 MG/DL (ref 75–110)
HCT VFR BLD CALC: 31.5 % (ref 36–47)
HGB BLD-MCNC: 10.5 G/DL (ref 12–15.5)
MCH RBC QN AUTO: 29.4 PG (ref 27–33.4)
MCHC RBC AUTO-ENTMCNC: 33.2 G/DL (ref 32–36)
MCV RBC AUTO: 89 FL (ref 80–97)
MONOCYTES % (MANUAL): 3 % (ref 3–13)
NEUTS BAND NFR BLD MANUAL: 2 % (ref 3–5)
PLATELET # BLD: 88 10^3/UL (ref 150–450)
PLATELET COMMENT: (no result)
POTASSIUM SERPL-SCNC: 3.8 MMOL/L (ref 3.6–5)
RBC # BLD AUTO: 3.56 10^6/UL (ref 3.72–5.28)
SAO2 % BLDA: 87.6 % (ref 94–98)
SCHISTOCYTES BLD QL SMEAR: SLIGHT
SEGMENTED NEUTROPHILS % (MAN): 93 % (ref 42–78)
TOTAL CELLS COUNTED BLD: 100
VARIANT LYMPHS NFR BLD MANUAL: 2 % (ref 13–45)
WBC # BLD AUTO: 10.5 10^3/UL (ref 4–10.5)

## 2020-05-01 RX ADMIN — INSULIN HUMAN SCH: 100 INJECTION, SOLUTION PARENTERAL at 10:56

## 2020-05-01 RX ADMIN — IMIPENEM AND CILASTATIN SODIUM SCH MLS/HR: 500; 500 INJECTION, POWDER, FOR SOLUTION INTRAVENOUS at 03:07

## 2020-05-01 RX ADMIN — METHOCARBAMOL SCH: 500 TABLET ORAL at 15:41

## 2020-05-01 RX ADMIN — Medication SCH: at 06:19

## 2020-05-01 RX ADMIN — Medication SCH MG: at 10:43

## 2020-05-01 RX ADMIN — INSULIN HUMAN SCH: 100 INJECTION, SOLUTION PARENTERAL at 06:17

## 2020-05-01 RX ADMIN — INSULIN HUMAN SCH: 100 INJECTION, SOLUTION PARENTERAL at 15:41

## 2020-05-01 RX ADMIN — MINERAL OIL AND WHITE PETROLATUM SCH: 150; 830 OINTMENT OPHTHALMIC at 15:41

## 2020-05-01 RX ADMIN — OXYCODONE HYDROCHLORIDE AND ACETAMINOPHEN SCH MG: 500 TABLET ORAL at 10:43

## 2020-05-01 RX ADMIN — INSULIN GLARGINE SCH: 100 INJECTION, SOLUTION SUBCUTANEOUS at 10:56

## 2020-05-01 RX ADMIN — NICARDIPINE HYDROCHLORIDE PRN MLS/HR: 0.1 INJECTION, SOLUTION INTRAVENOUS at 01:25

## 2020-05-01 RX ADMIN — METHOCARBAMOL SCH: 500 TABLET ORAL at 06:18

## 2020-05-01 RX ADMIN — HYDROCORTISONE SODIUM SUCCINATE SCH MG: 100 INJECTION, POWDER, FOR SOLUTION INTRAMUSCULAR; INTRAVENOUS at 10:40

## 2020-05-01 RX ADMIN — Medication SCH: at 15:41

## 2020-05-01 RX ADMIN — VITAMIN D, TAB 1000IU (100/BT) SCH UNIT: 25 TAB at 10:42

## 2020-05-01 RX ADMIN — IMIPENEM AND CILASTATIN SODIUM SCH MLS/HR: 500; 500 INJECTION, POWDER, FOR SOLUTION INTRAVENOUS at 10:38

## 2020-05-01 RX ADMIN — IMIPENEM AND CILASTATIN SODIUM SCH: 500; 500 INJECTION, POWDER, FOR SOLUTION INTRAVENOUS at 15:42

## 2020-05-01 RX ADMIN — MINERAL OIL AND WHITE PETROLATUM SCH: 150; 830 OINTMENT OPHTHALMIC at 06:17

## 2020-05-01 RX ADMIN — INSULIN HUMAN SCH: 100 INJECTION, SOLUTION PARENTERAL at 02:42

## 2020-05-01 RX ADMIN — ENOXAPARIN SODIUM SCH MG: 100 INJECTION SUBCUTANEOUS at 10:40

## 2020-05-01 RX ADMIN — FAMOTIDINE SCH MG: 20 TABLET, FILM COATED ORAL at 10:42

## 2020-05-01 NOTE — PDOC CRITICAL CARE PROG REPORT
General


Date:: 05/01/20


ICU Day:: 21


Ventilator Day:: 21


Resuscitation Status: Full Code


Medical Power of : DaughterMaryuri


Events in the past 12 to 24 Hours:: 





4/26


Patient had significant bradycardia today with prolonged period of asystole and 

frequent periods of ventricular rhythm in the 40s.  Cardiology consult was 

placed.  Transcutaneous pacing pads placed on patient, we will plan to keep 

atropine, dopamine and isoproterenol on call if an emergent situation arises.  A

right IJ Cordis was placed to facilitate the placement of transvenous pacing 

wires showed an emergent need be presented.





4/27


Overnight, patient had multiple episodes of bradycardia and asystole requiring 1

dose of atropine.  Patient also had some bleeding from the new Cordis site.  

Follow-up CBC showed an acute anemia.  2 units PRBC have been ordered.





4/28


Patient again has had multiple episodes of bradycardia and asystole over the 

past 24 hours.  Bleeding from Cordis site was controlled with localized 

pressure.  CBC improved significantly.  Patient has a endotracheal cuff leak 

which is requiring 5 to 10 cc of air to be added twice a day.  Plan will be to 

replace ET tube if cuff leak worsens.





4/29


Still with occasional episodes of bradycardia and asystole.  Unable to make any 

significant ventilator changes without desaturation.  She unfortunately still 

requires an FiO2 greater than 90%.  Repeat COVID tests are pending.  We will 

change Solu-Cortef to every 12 and continue to wean.





4/30:


Less frequent episodes of bradycardia and asystole.  Large amount of sediment 

seen in urine.  UA showed 3+ bacteria.  Patient empirically started on 

imipenem/cilastatin.  Urine culture sent.  Attempts being made today again to t

ry to reduce her FiO2.  First repeat COVID test is negative.  Solu-Cortef 

changed to every 12.





5/1:


Over the past 24 hours, patient has had worsening hypoxemia despite increased 

ventilator support.  Her FiO2 is now at 100%.  Her ventilator is giving her 

maximum inspiratory time and pressures to aid in oxygenation.  Despite this, her

SPO2 remains at 68%.  I have reached out to her family and expect to have a 

meeting with her children later today.  I have explained that given this 

situation, it is inappropriate and will be ineffective to do cardiac 

compressions in the event that her heart were to stop from refractory hypoxemia.


Review of systems relevant to events:: 





Please see complete review of systems below.


Reason for ICU Addmission:: Acute hypoxic respiratory failure with SARS, 2-

CoViD19. Intubated. Supine.





- Medications:


Medications reviewed and adjusted accordingly: Yes





Physical Exam


Vital Signs: 


                                        











Temp Pulse Resp BP Pulse Ox


 


 95.4 F L  97   16   126/65 H  71 L


 


 05/01/20 06:15  04/30/20 20:00  05/01/20 06:15  05/01/20 06:00  05/01/20 11:10








                                 Intake & Output











 04/30/20 05/01/20 05/02/20





 06:59 06:59 06:59


 


Intake Total 2443 969 


 


Output Total 1450 1165 250


 


Balance 993 -196 -250


 


Weight 124.5 kg 127.6 kg 








                                  Weight/Height





Weight                           127.6 kg


Height                           5 ft 3 in








General appearance: PRESENT: morbidly obese


Head exam: PRESENT: atraumatic, normocephalic


Eye exam: PRESENT: PERRLA


Ear exam: PRESENT: normal external ear exam.  ABSENT: bleeding, drainage


Mouth exam: PRESENT: moist


Neck exam: PRESENT: full ROM.  ABSENT: carotid bruit, JVD


Respiratory exam: PRESENT: decreased breath sounds


Cardiovascular exam: PRESENT: bradycardia, irregular rhythm, tachycardia


Vascular exam: PRESENT: normal capillary refill


GI/Abdominal exam: PRESENT: diminished bowel sounds


Extremities exam: PRESENT: +1 edema


Musculoskeletal exam: PRESENT: normal inspection


Neurological exam: PRESENT: altered, CN II-XII grossly intact


Tubes/Lines: PRESENT: Endotracheal Tube, Central Line, Arterial Catheter





Laboratory/Radiographs


Laboratory Results: 


                                        





                                 05/01/20 03:30 





                                 05/01/20 03:30 





                                        











  05/01/20 05/01/20 05/01/20





  03:30 03:30 03:30


 


WBC    10.5


 


RBC    3.56 L


 


Hgb    10.5 L


 


Hct    31.5 L


 


MCV    89


 


MCH    29.4


 


MCHC    33.2


 


RDW    18.0 H


 


Plt Count    88 L


 


Seg Neutrophils %    Not Reportable


 


Carbonic Acid  1.78 H  


 


HCO3/H2CO3 Ratio  16:1  


 


ABG pH  7.31 L  


 


ABG pCO2  59.0 H  


 


ABG pO2  59.0 L  


 


ABG HCO3  29.0 H  


 


ABG O2 Saturation  87.6 L  


 


ABG Base Excess  1.7  


 


FiO2  100%  


 


Sodium   146.8 H 


 


Potassium   3.8 


 


Chloride   112 H 


 


Carbon Dioxide   35 H 


 


Anion Gap   0 L 


 


BUN   35 H 


 


Creatinine   0.56 


 


Est GFR ( Amer)   > 60 


 


Glucose   140 H 


 


Calcium   8.1 L 








                                        











  04/05/20 04/10/20 04/10/20





  14:00 11:48 11:48


 


Creatine Kinase   105 


 


CK-MB (CK-2)    0.87


 


Troponin I    < 0.012


 


NT-Pro-B Natriuret Pep  32  














  04/10/20 04/10/20 04/11/20





  22:45 22:45 04:45


 


Creatine Kinase  60   46


 


CK-MB (CK-2)   0.41 


 


Troponin I   0.018 


 


NT-Pro-B Natriuret Pep   














  04/11/20





  04:45


 


Creatine Kinase 


 


CK-MB (CK-2)  0.47


 


Troponin I  < 0.012


 


NT-Pro-B Natriuret Pep 











Impressions: 


                                        





Chest/Abdomen CTA  04/05/20 16:08


IMPRESSION:


1. Patchy peripheral and perihilar ground-glass opacities in both lungs.  These 

are commonly reported imaging features of COVID-19   pneumonia are present. 

Other processes such as influenza pneumonia and organizing pneumonia, as can be 

seen with drug toxicity and connective tissue disease, can cause a similar 

imaging pattern. PneTyp


2. Limited evaluation of the pulmonary arteries due to contrast bolus timing.  

No large central pulmonary embolus.  Evaluation of the segmental and 

subsegmental pulmonary arteries is limited.


 








Chest X-Ray  04/26/20 00:00


IMPRESSION:  NO PNEUMOTHORAX FOLLOWING CENTRAL LINE PLACEMENT.  OVERALL NO 

SIGNIFICANT INTERVAL CHANGE IN APPEARANCE OF THE CHEST.


 











All labs, radiographs, diagnostic studies and EKGs were personally reviewed: Yes


In addition, reports of radiographic and diagnostic studies were read: Yes





Assessment and Plan





- Diagnosis


(1) Acute hypoxemic respiratory failure


Is this a current diagnosis for this admission?: Yes   


Plan: 


All modes of ventilation to optimize patient's oxygenation has been exhausted.  

Despite high inspiratory pressures, high mean airway pressures, 100% FiO2, 

patient remains with an SPO2 of 68%.  There is been no change for several hours 

and I am concerned that the patient is not receiving adequate oxygenation to her

vital organs including her brain.  I have spoken with the family to update them 

on several occasions and will plan to have a family meeting this evening.  If in

the event of a cardiac arrest due to refractory hypoxemia, chest compressions 

would be futile due to a lack of ability to oxygenate.








(2) Asystole


Is this a current diagnosis for this admission?: Yes   


Plan: 


Less frequent episodes of asystole/bradycardia today. Cordis in place if 

transvenous pacer needed.








(3) Suspected COVID-19 virus infection


Is this a current diagnosis for this admission?: Yes   


Plan: 


Patient with persistent respiratory failure in secondary to COVID-19 virus 

infection.  Second repeat COVID test was sent out today.





Plan Summary: 


ICU day: 21





Neuro: Patient remains responsive only to discomfort.  He has required sedation 

to maintain synchrony with current ventilator settings.  Patient also received 

paralytics to help facilitate oxygenation.  Unfortunately, there is a very high 

risk of neurologic ischemia given her prolonged refractory hypoxemia.


Pulmonary: Patient remains on pressure control ventilation with inspiratory 

pressure 30, inspiratory time: 2.6 seconds, PEEP 8, respiratory rate 15, FiO2 

100%.  We have had difficulty over the past several days weaning her FiO2 and 

inspiratory time due to hypoxemia.  Her refractory hypoxemia has unfortunately 

gotten worse and her SPO2 has been 68% for several hours today.  I have spoken 

with the family on multiple occasions to keep him updated of her situation.  Her

prognosis at this point is extremely poor and I would recommend comfort 

measures.





Cardiovascular: Less frequent episodes of tachycardia/bradycardia today.  

Vascular pacing wires have been on standby and not placed due to their potential

for causing further arrhythmias.  Pacing mode available is set pacing only and 

does not sense.  We will therefore only use intervascular pacing if patient has 

persistent asystole or complete heart block.


       Indwelling catheters: Cortis in the RIJ and TLC in SVC. No signs of 

bleeding or infection.


Heme: H&H remains stable


      DVT prophylaxis: Continue current dose of Lovenox.


Renal: Renal panel has been stable.  Mildly elevated sodium level.  BUN and 

creatinine are within normal limits.


Gastrointestinal: Tube feeds restarted.  No significant residuals.


      GI prophylaxis: Continue GI prophylaxis with Pepcid 20 mg p.o. every 12 

hours.


Current: Patient with a history of DM 2, on regular insulin sliding scale as 

well as Lantus.  Tube feeds restarted.


: Ervin catheter in place.  Purulent discharge from Ervin catheter yesterday. 

Ervin catheter was changed and imipenem/cilastatin was started.


ID: WBC is stable at 10.5.  No fever today.  Antibiotics started as above for 

empiric treatment of UTI.





Barriers to discharge from ICU: Severe refractory hypoxemia with frequent 

cardiac arrhythmias.














Critical Time


Critical Time (minutes): 75


Level of Care: ICU


-: 


1.  The care of a critical patient is a dynamic process.  This note is a 

representative synopsis but static in nature.  The timeframe for treatments 

given in order is not necessarily the actual time these treatments may have been

done.





2.  This patient requires critical care secondary to ongoing requirements for 

therapy not offered or safe outside the critical care environment.  Transfer to 

a lower level of care will result in altered life or limb morbidity and 

mortality.





3.  Multidisciplinary rounds completed.





4.  ABCDE bundle addressed.